# Patient Record
Sex: MALE | Race: WHITE | Employment: FULL TIME | URBAN - METROPOLITAN AREA
[De-identification: names, ages, dates, MRNs, and addresses within clinical notes are randomized per-mention and may not be internally consistent; named-entity substitution may affect disease eponyms.]

---

## 2021-11-11 DIAGNOSIS — I35.0 NONRHEUMATIC AORTIC (VALVE) STENOSIS: Primary | ICD-10-CM

## 2021-12-13 ENCOUNTER — HOSPITAL ENCOUNTER (OUTPATIENT)
Dept: CT IMAGING | Age: 46
Discharge: HOME | End: 2021-12-13
Attending: NURSE PRACTITIONER
Payer: COMMERCIAL

## 2021-12-13 DIAGNOSIS — I35.0 NONRHEUMATIC AORTIC (VALVE) STENOSIS: ICD-10-CM

## 2021-12-13 PROCEDURE — 74011000636 HC RX REV CODE- 636: Performed by: NURSE PRACTITIONER

## 2021-12-13 PROCEDURE — 71275 CT ANGIOGRAPHY CHEST: CPT

## 2021-12-13 RX ADMIN — IOPAMIDOL 100 ML: 755 INJECTION, SOLUTION INTRAVENOUS at 07:28

## 2021-12-15 ENCOUNTER — OFFICE VISIT (OUTPATIENT)
Dept: CARDIOTHORACIC SURGERY | Age: 46
End: 2021-12-15
Payer: COMMERCIAL

## 2021-12-15 VITALS
BODY MASS INDEX: 29.34 KG/M2 | RESPIRATION RATE: 16 BRPM | OXYGEN SATURATION: 99 % | HEIGHT: 71 IN | DIASTOLIC BLOOD PRESSURE: 90 MMHG | HEART RATE: 76 BPM | TEMPERATURE: 98.3 F | SYSTOLIC BLOOD PRESSURE: 162 MMHG | WEIGHT: 209.6 LBS

## 2021-12-15 DIAGNOSIS — I35.0 AORTIC VALVE STENOSIS, ETIOLOGY OF CARDIAC VALVE DISEASE UNSPECIFIED: Primary | ICD-10-CM

## 2021-12-15 PROCEDURE — 99204 OFFICE O/P NEW MOD 45 MIN: CPT | Performed by: NURSE PRACTITIONER

## 2021-12-15 RX ORDER — ASCORBIC ACID 500 MG
500 TABLET ORAL DAILY
COMMUNITY
End: 2022-01-17

## 2021-12-15 NOTE — PROGRESS NOTES
CSS   History and Physical    Subjective:      Tao Best is a 55 y.o. male who was referred for cardiac evaluation from Dr. Ken Berrios for AS. He has a medical history of AS and recent Covid infection. 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 He has been followed by Cardiology since then. He had his annual TTE in November of this year and was told he needed to see cardiac surgery. Since then he has had anxiety and stress about his AV. He is very active. 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 He had been running and playing soccer. Does report some decrease in activity. More SOB when playing soccer that has occurred slowly over the past couple of years. He has had CP over the past couple weeks laying in bed, not with activity, when he is feeling anxious. He states it feels like heartburn. Has felt occasional flutters in his chest over past month. He has had SOB when falling asleep -never woken from sleep with SOB. He has also been attributing this to anxiety. He denies dizziness, edema. He had covid-19 in September 2021 and had SOB and chest tightness for about 3 days. He is a former smoker -quit 13 years ago, smokes for about 10 years. He occasionally drinks alcohol. He is  -has 3kids 15years old and 3years old. He works as a . He is active playing soccer and running. He does have a family history of AS -dad's brother and paternal grandfather. Covid vaccine [employer] 9/2021. Cardiac Testing    Cardiac catheterization: none    ECHO: EF 60%, calcified AoV with severe AS mean gradient 45 mmhg. Mild LAE. RVSP 28 mmhg. Mildly dilated ascending aorta 42 mm. CTA chest:  Arteriogram:     Thoracic aorta: Coarse aortic valvular calcifications. Minimal aneurysmal  dilatation of the ascending aorta measuring 4 cm. No significant thoracic aortic  stenosis.  Great vessel origins are patent     Chest:     LUNGS: No focal mass or consolidation. LYMPH NODES: No thoracic lymphadenopathy. PLEURAL FLUID: No pleural effusion. PERICARDIAL FLUID: No pericardial effusion. THYROID: No thyroid nodule. OTHER: None        IMPRESSION     Aortic valvular calcifications. Borderline aneurysmal dilatation of the  ascending aorta measuring 4 cm. Past Medical History:   Diagnosis Date    Aortic stenosis      History reviewed. No pertinent surgical history. Social History     Tobacco Use    Smoking status: Former Smoker     Packs/day: 1.00     Years: 6.50     Pack years: 6.50     Quit date:      Years since quittin.9    Smokeless tobacco: Never Used   Substance Use Topics    Alcohol use: Yes     Alcohol/week: 12.0 standard drinks     Types: 12 Cans of beer per week      Family History   Problem Relation Age of Onset    Aortic stenosis Paternal Uncle     Stroke Paternal Grandfather      Prior to Admission medications    Medication Sig Start Date End Date Taking? Authorizing Provider   zinc 50 mg tab tablet Take 50 mg by mouth daily. Yes Provider, Historical   ascorbic acid, vitamin C, (Vitamin C) 500 mg tablet Take 500 mg by mouth daily. Yes Provider, Historical       Not on File      Review of Systems: pertinent positives in HPI   Consititutional: Denies fever or chills. Eyes:  Denies use of glasses or vision problems(cataracts). ENT:  Denies hearing or swallowing difficulty. CV: Denies CP, claudication, HTN. Resp: Denies dyspnea, productive cough. : Denies dialysis or kidney problems. GI: Denies ulcers, esophageal strictures, liver problems. M/S: Denies joint or bone problems, or implanted artificial hardware. Skin: Denies varicose veins, edema. Neuro: Denies strokes, or TIAs. Psych: Denies anxiety or depression. Endocrine: Denies thyroid problems or diabetes. Heme/Lymphatic: Denies easy bruising or lymphedema.      Objective:     VS:   Visit Vitals  BP (!) 162/90 (BP 1 Location: Right upper arm, BP Patient Position: Sitting)   Pulse 76   Temp 98.3 °F (36.8 °C) (Oral)   Resp 16   Ht 5' 11\" (1.803 m)   Wt 209 lb 9.6 oz (95.1 kg)   SpO2 99%   BMI 29.23 kg/m²       Physical Exam:    General appearance: alert, cooperative, no distress  Head: normocephalic, without obvious abnormality; atraumatic  Eyes: conjunctivae/corneas clear; EOM's intact. Nose: nares normal; no drainage. Neck: no carotid bruit and no JVD  Lungs: clear to auscultation bilaterally  Heart: regular rate and rhythm; +4/6 murmur  Abdomen: soft, non-tender; bowel sounds normal  Extremities: moves all extremities; no weakness. Skin: Skin color normal; No varicose veins or edema. Neurologic: Grossly normal      Labs: No results for input(s): WBC, HGB, HCT, PLT, NA, K, BUN, CREA, GLU, GLUCPOC, INR, HGBEXT, HCTEXT, PLTEXT, INREXT, HGBEXT, HCTEXT, PLTEXT, INREXT in the last 72 hours. No lab exists for component: GLPOC    Diagnostics:   PA and lateral: needs    Carotid doppler: needs    PFTS-FEV1: none    EKG: needs      Plan:     STS Risk Calculator V2.81 - Discussed by surgeon with patient. Procedure: Isolated AVR  Risk of Mortality:  0.448%  Renal Failure:  0.755%  Permanent Stroke:  0.417%  Prolonged Ventilation:  2.010%  DSW Infection:  0.065%  Reoperation:  1.964%  Morbidity or Mortality:  3.904%  Short Length of Stay:  74.224%  Long Length of Stay:  1.224%    Treatment Plan:    1. Severe AS: Plan for AVR with Dr. Gregorio Noss. 2. Recent covid 19 infection: resolved  3. HTN: BP elevated today. Pt reports due to anxiety at this appointment, monitor  4. Anxiety: situational dt need for surgery, monitor. Signed By: Юлия Lawrence NP     December 15, 2021       Addendum: Pt seen and examined. Images reviewed. Agree with NP Jane Riley note. He is a great candidate for open AVR. We discussed mechanical vs tissue valve and he prefers an On-X mechanical valve.  We discssued the risks and benefiits and he agreed to proceed with surgery. We have him  Tentatively scheduled the first week of January. I discussed directly with his referring physician, Dr. Shelby Woo.

## 2021-12-15 NOTE — PROGRESS NOTES
Chief Complaint   Patient presents with    Aortic Stenosis     1. Have you been to the ER, urgent care clinic since your last visit? Hospitalized since your last visit? No    2. Have you seen or consulted any other health care providers outside of the [address] [place] [address] since your last visit? Include any pap smears or colon screening.  No

## 2021-12-16 DIAGNOSIS — I35.0 AORTIC VALVE STENOSIS, ETIOLOGY OF CARDIAC VALVE DISEASE UNSPECIFIED: Primary | ICD-10-CM

## 2021-12-29 ENCOUNTER — HOSPITAL ENCOUNTER (OUTPATIENT)
Dept: GENERAL RADIOLOGY | Age: 46
Discharge: HOME | End: 2021-12-29
Attending: THORACIC SURGERY (CARDIOTHORACIC VASCULAR SURGERY)
Payer: COMMERCIAL

## 2021-12-29 ENCOUNTER — HOSPITAL ENCOUNTER (OUTPATIENT)
Dept: PREADMISSION TESTING | Age: 46
Discharge: HOME | End: 2021-12-29
Attending: THORACIC SURGERY (CARDIOTHORACIC VASCULAR SURGERY)
Payer: COMMERCIAL

## 2021-12-29 ENCOUNTER — HOSPITAL ENCOUNTER (OUTPATIENT)
Dept: VASCULAR SURGERY | Age: 46
Discharge: HOME | End: 2021-12-29
Attending: NURSE PRACTITIONER
Payer: COMMERCIAL

## 2021-12-29 VITALS
DIASTOLIC BLOOD PRESSURE: 79 MMHG | SYSTOLIC BLOOD PRESSURE: 127 MMHG | RESPIRATION RATE: 18 BRPM | HEART RATE: 62 BPM | TEMPERATURE: 98.1 F | OXYGEN SATURATION: 98 %

## 2021-12-29 DIAGNOSIS — I35.0 AORTIC VALVE STENOSIS, ETIOLOGY OF CARDIAC VALVE DISEASE UNSPECIFIED: ICD-10-CM

## 2021-12-29 LAB
ALBUMIN SERPL-MCNC: 4.2 G/DL (ref 3.5–5)
ALBUMIN/GLOB SERPL: 1.2 {RATIO} (ref 1.1–2.2)
ALP SERPL-CCNC: 56 U/L (ref 45–117)
ALT SERPL-CCNC: 26 U/L (ref 12–78)
ANION GAP SERPL CALC-SCNC: 5 MMOL/L (ref 5–15)
APPEARANCE UR: CLEAR
APTT PPP: 26.2 SEC (ref 22.1–31)
ARTERIAL PATENCY WRIST A: YES
AST SERPL-CCNC: 23 U/L (ref 15–37)
ATRIAL RATE: 60 BPM
BACTERIA URNS QL MICRO: NEGATIVE /HPF
BASE EXCESS BLDA CALC-SCNC: 1.6 MMOL/L
BASOPHILS # BLD: 0.1 K/UL (ref 0–0.1)
BASOPHILS NFR BLD: 1 % (ref 0–1)
BDY SITE: NORMAL
BILIRUB SERPL-MCNC: 0.4 MG/DL (ref 0.2–1)
BILIRUB UR QL: NEGATIVE
BNP SERPL-MCNC: 172 PG/ML
BUN SERPL-MCNC: 18 MG/DL (ref 6–20)
BUN/CREAT SERPL: 16 (ref 12–20)
CALCIUM SERPL-MCNC: 9.5 MG/DL (ref 8.5–10.1)
CALCULATED P AXIS, ECG09: 10 DEGREES
CALCULATED R AXIS, ECG10: -8 DEGREES
CALCULATED T AXIS, ECG11: 24 DEGREES
CHLORIDE SERPL-SCNC: 105 MMOL/L (ref 97–108)
CO2 SERPL-SCNC: 29 MMOL/L (ref 21–32)
COLOR UR: NORMAL
CREAT SERPL-MCNC: 1.11 MG/DL (ref 0.7–1.3)
DIAGNOSIS, 93000: NORMAL
DIFFERENTIAL METHOD BLD: NORMAL
EOSINOPHIL # BLD: 0.1 K/UL (ref 0–0.4)
EOSINOPHIL NFR BLD: 1 % (ref 0–7)
EPITH CASTS URNS QL MICRO: NORMAL /LPF
ERYTHROCYTE [DISTWIDTH] IN BLOOD BY AUTOMATED COUNT: 12.1 % (ref 11.5–14.5)
EST. AVERAGE GLUCOSE BLD GHB EST-MCNC: 103 MG/DL
GLOBULIN SER CALC-MCNC: 3.5 G/DL (ref 2–4)
GLUCOSE SERPL-MCNC: 86 MG/DL (ref 65–100)
GLUCOSE UR STRIP.AUTO-MCNC: NEGATIVE MG/DL
HBA1C MFR BLD: 5.2 % (ref 4–5.6)
HCO3 BLDA-SCNC: 26 MMOL/L (ref 22–26)
HCT VFR BLD AUTO: 44.7 % (ref 36.6–50.3)
HGB BLD-MCNC: 15.2 G/DL (ref 12.1–17)
HGB UR QL STRIP: NEGATIVE
HISTORY CHECKED?,CKHIST: NORMAL
HYALINE CASTS URNS QL MICRO: NORMAL /LPF (ref 0–5)
IMM GRANULOCYTES # BLD AUTO: 0 K/UL (ref 0–0.04)
IMM GRANULOCYTES NFR BLD AUTO: 0 % (ref 0–0.5)
INR PPP: 1 (ref 0.9–1.1)
KETONES UR QL STRIP.AUTO: NEGATIVE MG/DL
LEUKOCYTE ESTERASE UR QL STRIP.AUTO: NEGATIVE
LYMPHOCYTES # BLD: 1.6 K/UL (ref 0.8–3.5)
LYMPHOCYTES NFR BLD: 20 % (ref 12–49)
MAGNESIUM SERPL-MCNC: 2.3 MG/DL (ref 1.6–2.4)
MCH RBC QN AUTO: 32.4 PG (ref 26–34)
MCHC RBC AUTO-ENTMCNC: 34 G/DL (ref 30–36.5)
MCV RBC AUTO: 95.3 FL (ref 80–99)
MONOCYTES # BLD: 0.7 K/UL (ref 0–1)
MONOCYTES NFR BLD: 9 % (ref 5–13)
NEUTS SEG # BLD: 5.8 K/UL (ref 1.8–8)
NEUTS SEG NFR BLD: 69 % (ref 32–75)
NITRITE UR QL STRIP.AUTO: NEGATIVE
NRBC # BLD: 0 K/UL (ref 0–0.01)
NRBC BLD-RTO: 0 PER 100 WBC
P-R INTERVAL, ECG05: 162 MS
PCO2 BLDA: 41 MMHG (ref 35–45)
PH BLDA: 7.43 [PH] (ref 7.35–7.45)
PH UR STRIP: 6.5 [PH] (ref 5–8)
PLATELET # BLD AUTO: 209 K/UL (ref 150–400)
PMV BLD AUTO: 10.8 FL (ref 8.9–12.9)
PO2 BLDA: 90 MMHG (ref 80–100)
POTASSIUM SERPL-SCNC: 4.3 MMOL/L (ref 3.5–5.1)
PROT SERPL-MCNC: 7.7 G/DL (ref 6.4–8.2)
PROT UR STRIP-MCNC: NEGATIVE MG/DL
PROTHROMBIN TIME: 10.3 SEC (ref 9–11.1)
Q-T INTERVAL, ECG07: 424 MS
QRS DURATION, ECG06: 96 MS
QTC CALCULATION (BEZET), ECG08: 424 MS
RBC # BLD AUTO: 4.69 M/UL (ref 4.1–5.7)
RBC #/AREA URNS HPF: NORMAL /HPF (ref 0–5)
SAO2 % BLD: 97 % (ref 92–97)
SAO2% DEVICE SAO2% SENSOR NAME: NORMAL
SODIUM SERPL-SCNC: 139 MMOL/L (ref 136–145)
SP GR UR REFRACTOMETRY: 1.02 (ref 1–1.03)
SPECIMEN SITE: NORMAL
THERAPEUTIC RANGE,PTTT: NORMAL SECS (ref 58–77)
TSH SERPL DL<=0.05 MIU/L-ACNC: 2.35 UIU/ML (ref 0.36–3.74)
UA: UC IF INDICATED,UAUC: NORMAL
UROBILINOGEN UR QL STRIP.AUTO: 0.2 EU/DL (ref 0.2–1)
VENTRICULAR RATE, ECG03: 60 BPM
WBC # BLD AUTO: 8.3 K/UL (ref 4.1–11.1)
WBC URNS QL MICRO: NORMAL /HPF (ref 0–4)

## 2021-12-29 PROCEDURE — 71046 X-RAY EXAM CHEST 2 VIEWS: CPT

## 2021-12-29 PROCEDURE — 93005 ELECTROCARDIOGRAM TRACING: CPT

## 2021-12-29 PROCEDURE — 80053 COMPREHEN METABOLIC PANEL: CPT

## 2021-12-29 PROCEDURE — 86900 BLOOD TYPING SEROLOGIC ABO: CPT

## 2021-12-29 PROCEDURE — 84443 ASSAY THYROID STIM HORMONE: CPT

## 2021-12-29 PROCEDURE — 36600 WITHDRAWAL OF ARTERIAL BLOOD: CPT

## 2021-12-29 PROCEDURE — 85730 THROMBOPLASTIN TIME PARTIAL: CPT

## 2021-12-29 PROCEDURE — 85610 PROTHROMBIN TIME: CPT

## 2021-12-29 PROCEDURE — 83735 ASSAY OF MAGNESIUM: CPT

## 2021-12-29 PROCEDURE — 83880 ASSAY OF NATRIURETIC PEPTIDE: CPT

## 2021-12-29 PROCEDURE — 83036 HEMOGLOBIN GLYCOSYLATED A1C: CPT

## 2021-12-29 PROCEDURE — 36415 COLL VENOUS BLD VENIPUNCTURE: CPT

## 2021-12-29 PROCEDURE — 81001 URINALYSIS AUTO W/SCOPE: CPT

## 2021-12-29 PROCEDURE — 85025 COMPLETE CBC W/AUTO DIFF WBC: CPT

## 2021-12-29 PROCEDURE — 93880 EXTRACRANIAL BILAT STUDY: CPT

## 2021-12-29 PROCEDURE — 86923 COMPATIBILITY TEST ELECTRIC: CPT

## 2021-12-29 PROCEDURE — U0005 INFEC AGEN DETEC AMPLI PROBE: HCPCS

## 2021-12-29 PROCEDURE — 82803 BLOOD GASES ANY COMBINATION: CPT

## 2021-12-29 RX ORDER — MUPIROCIN 20 MG/G
OINTMENT TOPICAL 2 TIMES DAILY
Qty: 22 G | Refills: 0 | Status: SHIPPED | OUTPATIENT
Start: 2022-01-02 | End: 2022-01-04

## 2021-12-29 RX ORDER — AMIODARONE HYDROCHLORIDE 400 MG/1
400 TABLET ORAL 2 TIMES DAILY
COMMUNITY
Start: 2021-12-30 | End: 2021-12-29 | Stop reason: SINTOL

## 2021-12-29 RX ORDER — CHLORHEXIDINE GLUCONATE 1.2 MG/ML
15 RINSE ORAL 2 TIMES DAILY
Qty: 420 ML | Refills: 0 | Status: SHIPPED | OUTPATIENT
Start: 2022-01-02 | End: 2022-01-04

## 2021-12-29 RX ORDER — CHLORHEXIDINE GLUCONATE 1.2 MG/ML
15 RINSE ORAL EVERY 12 HOURS
COMMUNITY
Start: 2022-01-02 | End: 2021-12-29 | Stop reason: SDUPTHER

## 2021-12-29 NOTE — H&P
CSS   History and Physical    Subjective:      Taken from prior office consult - no significant changes    Franny Verdin is a 55 y.o. male who was referred for cardiac evaluation from Dr. Ibeth Troncoso for AS. He has a medical history of AS and recent Covid infection.      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 He has been followed by Cardiology since then. He had his annual TTE in November of this year and was told he needed to see cardiac surgery. Since then he has had anxiety and stress about his AV. He is very active. 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 He had been running and playing soccer. Does report some decrease in activity. More SOB when playing soccer that has occurred slowly over the past couple of years. He has had CP over the past couple weeks laying in bed, not with activity, when he is feeling anxious. He states it feels like heartburn. Has felt occasional flutters in his chest over past month. He has had SOB when falling asleep -never woken from sleep with SOB. He has also been attributing this to anxiety. He denies dizziness, edema. He had covid-19 in September 2021 and had SOB and chest tightness for about 3 days.       He is a former smoker -quit 13 years ago, smokes for about 10 years. He occasionally drinks alcohol. He is  -has 3kids 15years old and 3years old. He works as a . He is active playing soccer and running. He does have a family history of AS -dad's brother and paternal grandfather. Covid vaccine [employer] 9/2021.      Cardiac Testing     Cardiac catheterization: none     ECHO: EF 60%, calcified AoV with severe AS mean gradient 45 mmhg. Mild LAE. RVSP 28 mmhg. Mildly dilated ascending aorta 42 mm.      CTA chest:  Arteriogram:     Thoracic aorta: Coarse aortic valvular calcifications. Minimal aneurysmal  dilatation of the ascending aorta measuring 4 cm.  No significant thoracic aortic  stenosis. Great vessel origins are patent     Chest:     LUNGS: No focal mass or consolidation. LYMPH NODES: No thoracic lymphadenopathy. PLEURAL FLUID: No pleural effusion. PERICARDIAL FLUID: No pericardial effusion. THYROID: No thyroid nodule. OTHER: None        IMPRESSION     Aortic valvular calcifications. Borderline aneurysmal dilatation of the  ascending aorta measuring 4 cm. Past Medical History:   Diagnosis Date    Aortic stenosis     Aortic valve stenosis      No past surgical history on file. Social History     Tobacco Use    Smoking status: Former Smoker     Packs/day: 1.00     Years: 6.50     Pack years: 6.50     Quit date: 2007     Years since quitting: 15.0    Smokeless tobacco: Never Used   Substance Use Topics    Alcohol use: Yes     Alcohol/week: 12.0 standard drinks     Types: 12 Cans of beer per week      Family History   Problem Relation Age of Onset    Aortic stenosis Paternal Uncle     Stroke Paternal Grandfather     Aortic stenosis Paternal Grandmother      Prior to Admission medications    Medication Sig Start Date End Date Taking? Authorizing Provider   amiodarone (PACERONE) 400 mg tablet Take 400 mg by mouth two (2) times a day. 12/30/21   Provider, Historical   mupirocin calcium (Bactroban NasaL) 2 % nasal ointment by Both Nostrils route two (2) times a day. 1/2/22   Provider, Historical   chlorhexidine (Peridex) 0.12 % solution 15 mL by Swish and Spit route every twelve (12) hours. 1/2/22   Provider, Historical   zinc 50 mg tab tablet Take 50 mg by mouth daily. Provider, Historical   ascorbic acid, vitamin C, (Vitamin C) 500 mg tablet Take 500 mg by mouth daily. Provider, Historical       Not on File    Review of Systems:   Consititutional: Denies fever or chills. Eyes:  Denies use of glasses or vision problems(cataracts). ENT:  Denies hearing or swallowing difficulty. CV: Denies CP, claudication, HTN.   Resp: Denies dyspnea, productive cough. : Denies dialysis or kidney problems. GI: Denies ulcers, esophageal strictures, liver problems. M/S: Denies joint or bone problems, or implanted artificial hardware. Skin: Denies varicose veins, edema. Neuro: Denies strokes, or TIAs. Psych: Denies anxiety or depression. Endocrine: Denies thyroid problems or diabetes. Heme/Lymphatic: Denies easy bruising or lymphedema. Objective:     VS:   Wt: 95.1 kg  Ht: 180 cm  HR: 62  RR: 12  BP: 142/70  Temp: 98.1 degrees F  SpO2: 97%    Physical Exam:    General appearance: alert, cooperative, no distress  Head: normocephalic, without obvious abnormality; atraumatic  Eyes: conjunctivae/corneas clear; EOM's intact. Nose: nares normal; no drainage. Neck: no carotid bruit and no JVD  Lungs: clear to auscultation bilaterally  Heart: regular rate and rhythm; + murmur  Abdomen: soft, non-tender; bowel sounds normal  Extremities: moves all extremities; no weakness. Skin: Skin color normal; No varicose veins or edema. Neurologic: Grossly normal      Labs: No results for input(s): WBC, HGB, HCT, PLT, NA, K, BUN, CREA, GLU, GLUCPOC, INR, HGBEXT, HCTEXT, PLTEXT, INREXT, HGBEXT, HCTEXT, PLTEXT, INREXT in the last 72 hours. No lab exists for component: GLPOC    Diagnostics:   PA and lateral:    Carotid doppler:     PFTS-FEV1:     EKG:   Assessment:     Active Problems: Aortic stenosis (12/29/2021)        Plan:   The risk and benefit of surgery were reviewed with patient and family and all questions answered and the patient wishes to proceed. Risk include infection, bleeding, stroke, heart attack, irregular heart rhythm, kidney failure and death. The patient was given instructions and prescriptions for bactroban and peridex. No amio due to bradycardia. The patient was instructed to stop nothing. Surgery is scheduled for 1-4-21. STS Risk Calculator V2.81 - Discussed by surgeon with patient.    Procedure: Isolated AVR  Risk of Mortality:  0.448%  Renal Failure:  0.755%  Permanent Stroke:  0.417%  Prolonged Ventilation:  2.010%  DSW Infection:  0.065%  Reoperation:  1.964%  Morbidity or Mortality:  3.904%  Short Length of Stay:  74.224%  Long Length of Stay:  1.224%     Treatment Plan:    1. Severe AS: Plan for AVR with Dr. Fely Pablo. 2. Recent covid 19 infection: resolved      I have discussed with the patient the rationale for blood component transfusion; its benefits in treating or preventing fatigue, organ damage, or death; and its risk which includes mild transfusion reactions, rare risk of blood borne infection, or more serious but rare reactions. I have discussed the alternatives to transfusion, including the risk and consequences of not receiving transfusion. The patient had an opportunity to ask questions and had agreed to proceed with transfusion of blood components.       Signed By: BRYSON Ly     December 29, 2021

## 2021-12-29 NOTE — PROGRESS NOTES
Anesthesia  Consult note     Anesthesia consult requested by the surgeon for:  suitability of patient for General anesthesia for  minimal  Invasive surgery   contraindications for  One lung ventilation  Contraindication for DIMITRI  Suitability for invasive lines    Subjective:      Patient:  Grey Apgar     Procedure: Procedure(s):  AORTIC VALVE REPLACEMENT (AVR) WITH CARDIOPLEGIA      Not on File    Current Outpatient Medications   Medication Sig    zinc 50 mg tab tablet Take 50 mg by mouth daily.  ascorbic acid, vitamin C, (Vitamin C) 500 mg tablet Take 500 mg by mouth daily.  [START ON 2022] chlorhexidine (Peridex) 0.12 % solution 15 mL by Swish and Spit route two (2) times a day for 2 days.  [START ON 2022] mupirocin (BACTROBAN) 2 % ointment by Both Nostrils route two (2) times a day for 2 days. No current facility-administered medications for this encounter. Past Medical History:   Diagnosis Date    Aortic stenosis     Aortic valve stenosis        History reviewed. No pertinent surgical history. Social History     Tobacco Use    Smoking status: Former Smoker     Packs/day: 1.00     Years: 6.50     Pack years: 6.50     Quit date:      Years since quitting: 15.0    Smokeless tobacco: Never Used   Substance Use Topics    Alcohol use: Yes     Alcohol/week: 12.0 standard drinks     Types: 12 Cans of beer per week         Anesthetic Problems: None in the past   patient denies any problems with swallowing, esophageal stricture, upper GI bleed, GI surgery. No contraindications for DIMITRI. Objective:     Physical Exam:    Patient Vitals for the past 8 hrs:   BP Temp Pulse Resp SpO2   21 1304 127/79 36.7 °C (98.1 °F) 62 18 98 %       Temp (24hrs), Av.7 °C (98.1 °F), Min:36.7 °C (98.1 °F), Max:36.7 °C (98.1 °F)      Airway Class: 2  Heart-  Regular rate and rhythm,   s1 s2 heard slight murmer.   Lungs- Clear bilateral on ascultation  Abd- Soft, non tender no organomegaly  Limbs- Normal  Neuro- Normal    Labs:   Recent Results (from the past 24 hour(s))   DUPLEX CAROTID BILATERAL    Collection Time: 12/29/21 11:56 AM   Result Value Ref Range    Right subclavian prox .2 cm/s    Right subclavian prox EDV 13.7 cm/s    Right cca dist sys 82.2 cm/s    Right CCA dist turner 20.8 cm/s    Right CCA prox sys 120.6 cm/s    Right CCA prox turner 30.0 cm/s    Right ICA dist sys 79.0 cm/s    Right ICA dist turner 32.1 cm/s    Right ICA mid sys 88.7 cm/s    Right ICA mid turner 35.4 cm/s    Right ICA prox sys 74.1 cm/s    Right ICA prox turner 22.5 cm/s    Right eca sys 109.6 cm/s    RIGHT EXTERNAL CAROTID ARTERY D 25.67 cm/s    Right vertebral sys 59.2 cm/s    RIGHT VERTEBRAL ARTERY D 16.37 cm/s    Right ICA/CCA sys 1.1     Left subclavian prox .7 cm/s    Left subclavian prox EDV 20.7 cm/s    Left CCA dist sys 87.9 cm/s    Left CCA dist turner 19.3 cm/s    Left CCA prox sys 116.1 cm/s    Left CCA prox turner 23.5 cm/s    Left ICA dist sys 95.2 cm/s    Left ICA dist turner 32.2 cm/s    Left ICA mid sys 103.2 cm/s    Left ICA mid turner 30.5 cm/s    Left ICA prox sys 89.2 cm/s    Left ICA prox turner 21.9 cm/s    Left ECA sys 111.3 cm/s    LEFT EXTERNAL CAROTID ARTERY D 25.68 cm/s    Left vertebral sys 50.4 cm/s    LEFT VERTEBRAL ARTERY D 15.28 cm/s    Left ICA/CCA sys 1.17    CBC WITH AUTOMATED DIFF    Collection Time: 12/29/21  1:07 PM   Result Value Ref Range    WBC 8.3 4.1 - 11.1 K/uL    RBC 4.69 4.10 - 5.70 M/uL    HGB 15.2 12.1 - 17.0 g/dL    HCT 44.7 36.6 - 50.3 %    MCV 95.3 80.0 - 99.0 FL    MCH 32.4 26.0 - 34.0 PG    MCHC 34.0 30.0 - 36.5 g/dL    RDW 12.1 11.5 - 14.5 %    PLATELET 462 216 - 989 K/uL    MPV 10.8 8.9 - 12.9 FL    NRBC 0.0 0  WBC    ABSOLUTE NRBC 0.00 0.00 - 0.01 K/uL    NEUTROPHILS 69 32 - 75 %    LYMPHOCYTES 20 12 - 49 %    MONOCYTES 9 5 - 13 %    EOSINOPHILS 1 0 - 7 %    BASOPHILS 1 0 - 1 %    IMMATURE GRANULOCYTES 0 0.0 - 0.5 %    ABS.  NEUTROPHILS 5.8 1.8 - 8.0 K/UL    ABS. LYMPHOCYTES 1.6 0.8 - 3.5 K/UL    ABS. MONOCYTES 0.7 0.0 - 1.0 K/UL    ABS. EOSINOPHILS 0.1 0.0 - 0.4 K/UL    ABS. BASOPHILS 0.1 0.0 - 0.1 K/UL    ABS. IMM. GRANS. 0.0 0.00 - 0.04 K/UL    DF AUTOMATED     METABOLIC PANEL, COMPREHENSIVE    Collection Time: 12/29/21  1:07 PM   Result Value Ref Range    Sodium 139 136 - 145 mmol/L    Potassium 4.3 3.5 - 5.1 mmol/L    Chloride 105 97 - 108 mmol/L    CO2 29 21 - 32 mmol/L    Anion gap 5 5 - 15 mmol/L    Glucose 86 65 - 100 mg/dL    BUN 18 6 - 20 MG/DL    Creatinine 1.11 0.70 - 1.30 MG/DL    BUN/Creatinine ratio 16 12 - 20      GFR est AA >60 >60 ml/min/1.73m2    GFR est non-AA >60 >60 ml/min/1.73m2    Calcium 9.5 8.5 - 10.1 MG/DL    Bilirubin, total 0.4 0.2 - 1.0 MG/DL    ALT (SGPT) 26 12 - 78 U/L    AST (SGOT) 23 15 - 37 U/L    Alk.  phosphatase 56 45 - 117 U/L    Protein, total 7.7 6.4 - 8.2 g/dL    Albumin 4.2 3.5 - 5.0 g/dL    Globulin 3.5 2.0 - 4.0 g/dL    A-G Ratio 1.2 1.1 - 2.2     PROTHROMBIN TIME + INR    Collection Time: 12/29/21  1:07 PM   Result Value Ref Range    INR 1.0 0.9 - 1.1      Prothrombin time 10.3 9.0 - 11.1 sec   PTT    Collection Time: 12/29/21  1:07 PM   Result Value Ref Range    aPTT 26.2 22.1 - 31.0 sec    aPTT, therapeutic range     58.0 - 77.0 SECS   NT-PRO BNP    Collection Time: 12/29/21  1:07 PM   Result Value Ref Range    NT pro- (H) <125 PG/ML   TSH 3RD GENERATION    Collection Time: 12/29/21  1:07 PM   Result Value Ref Range    TSH 2.35 0.36 - 3.74 uIU/mL   URINALYSIS W/ REFLEX CULTURE    Collection Time: 12/29/21  1:07 PM    Specimen: Urine   Result Value Ref Range    Color YELLOW/STRAW      Appearance CLEAR CLEAR      Specific gravity 1.021 1.003 - 1.030      pH (UA) 6.5 5.0 - 8.0      Protein Negative NEG mg/dL    Glucose Negative NEG mg/dL    Ketone Negative NEG mg/dL    Bilirubin Negative NEG      Blood Negative NEG      Urobilinogen 0.2 0.2 - 1.0 EU/dL    Nitrites Negative NEG      Leukocyte Esterase Negative NEG      WBC 0-4 0 - 4 /hpf    RBC 0-5 0 - 5 /hpf    Epithelial cells FEW FEW /lpf    Bacteria Negative NEG /hpf    UA:UC IF INDICATED CULTURE NOT INDICATED BY UA RESULT CNI      Hyaline cast 0-2 0 - 5 /lpf   MAGNESIUM    Collection Time: 12/29/21  1:07 PM   Result Value Ref Range    Magnesium 2.3 1.6 - 2.4 mg/dL   EKG, 12 LEAD, INITIAL    Collection Time: 12/29/21  1:32 PM   Result Value Ref Range    Ventricular Rate 60 BPM    Atrial Rate 60 BPM    P-R Interval 162 ms    QRS Duration 96 ms    Q-T Interval 424 ms    QTC Calculation (Bezet) 424 ms    Calculated P Axis 10 degrees    Calculated R Axis -8 degrees    Calculated T Axis 24 degrees    Diagnosis       Normal sinus rhythm  Normal ECG  No previous ECGs available     BLOOD GAS, ARTERIAL    Collection Time: 12/29/21  2:16 PM   Result Value Ref Range    pH 7.43 7.35 - 7.45      PCO2 41 35 - 45 mmHg    PO2 90 80 - 100 mmHg    O2 SAT 97 92 - 97 %    BICARBONATE 26 22 - 26 mmol/L    BASE EXCESS 1.6 mmol/L    O2 METHOD ROOM AIR      Sample source ARTERIAL      SITE LEFT RADIAL      LIZABETH'S TEST YES           Assessment:       Active Problems:    * No active hospital problems. *      ASA4    Plan/Recommendations/Medical Decision Making:       Anesthetic Plan: GETA with invasive monitoring, post op ventilation and DIMITRI monitoring  Risks and benefits of the anesthetic plan discussed and agreed upon by the patient.       no contraindications  For one lung ventilation, large bore lines, DIMITRI  Signed By: Alex Gambino MD  Date:           12/29/2021  Time:          2:26 PM  Anesthesia  Consult note     Anesthesia consult requested by the surgeon for:  suitability of patient for General anesthesia for  minimal  Invasive surgery   contraindications for  One lung ventilation  Contraindication for DIMITRI  Suitability for invasive lines    Subjective:      Patient:  Shellye [place]     Procedure: Procedure(s):  AORTIC VALVE REPLACEMENT (AVR) WITH CARDIOPLEGIA      Not on File    Current Outpatient Medications   Medication Sig    zinc 50 mg tab tablet Take 50 mg by mouth daily.  ascorbic acid, vitamin C, (Vitamin C) 500 mg tablet Take 500 mg by mouth daily.  [START ON 2022] chlorhexidine (Peridex) 0.12 % solution 15 mL by Swish and Spit route two (2) times a day for 2 days.  [START ON 2022] mupirocin (BACTROBAN) 2 % ointment by Both Nostrils route two (2) times a day for 2 days. No current facility-administered medications for this encounter. Past Medical History:   Diagnosis Date    Aortic stenosis     Aortic valve stenosis        History reviewed. No pertinent surgical history. Social History     Tobacco Use    Smoking status: Former Smoker     Packs/day: 1.00     Years: 6.50     Pack years: 6.50     Quit date:      Years since quitting: 15.0    Smokeless tobacco: Never Used   Substance Use Topics    Alcohol use: Yes     Alcohol/week: 12.0 standard drinks     Types: 12 Cans of beer per week         Anesthetic Problems: None in the past   patient denies any problems with swallowing, esophageal stricture, upper GI bleed, GI surgery. No contraindications for DIMITRI. Objective:     Physical Exam:    Patient Vitals for the past 8 hrs:   BP Temp Pulse Resp SpO2   21 1304 127/79 36.7 °C (98.1 °F) 62 18 98 %       Temp (24hrs), Av.7 °C (98.1 °F), Min:36.7 °C (98.1 °F), Max:36.7 °C (98.1 °F)      Airway Class: 2  Heart-  Regular rate and rhythm,   s1 s2 heard slight   murmer.   Lungs- Clear bilateral on ascultation  Abd- Soft, non tender no organomegaly  Limbs- Normal  Neuro- Normal    Labs:   Recent Results (from the past 24 hour(s))   DUPLEX CAROTID BILATERAL    Collection Time: 21 11:56 AM   Result Value Ref Range    Right subclavian prox .2 cm/s    Right subclavian prox EDV 13.7 cm/s    Right cca dist sys 82.2 cm/s    Right CCA dist turner 20.8 cm/s    Right CCA prox sys 120.6 cm/s    Right CCA prox turner 30.0 cm/s    Right ICA dist sys 79.0 cm/s    Right ICA dist turner 32.1 cm/s    Right ICA mid sys 88.7 cm/s    Right ICA mid turner 35.4 cm/s    Right ICA prox sys 74.1 cm/s    Right ICA prox turner 22.5 cm/s    Right eca sys 109.6 cm/s    RIGHT EXTERNAL CAROTID ARTERY D 25.67 cm/s    Right vertebral sys 59.2 cm/s    RIGHT VERTEBRAL ARTERY D 16.37 cm/s    Right ICA/CCA sys 1.1     Left subclavian prox .7 cm/s    Left subclavian prox EDV 20.7 cm/s    Left CCA dist sys 87.9 cm/s    Left CCA dist turner 19.3 cm/s    Left CCA prox sys 116.1 cm/s    Left CCA prox turner 23.5 cm/s    Left ICA dist sys 95.2 cm/s    Left ICA dist turner 32.2 cm/s    Left ICA mid sys 103.2 cm/s    Left ICA mid turner 30.5 cm/s    Left ICA prox sys 89.2 cm/s    Left ICA prox turner 21.9 cm/s    Left ECA sys 111.3 cm/s    LEFT EXTERNAL CAROTID ARTERY D 25.68 cm/s    Left vertebral sys 50.4 cm/s    LEFT VERTEBRAL ARTERY D 15.28 cm/s    Left ICA/CCA sys 1.17    CBC WITH AUTOMATED DIFF    Collection Time: 12/29/21  1:07 PM   Result Value Ref Range    WBC 8.3 4.1 - 11.1 K/uL    RBC 4.69 4.10 - 5.70 M/uL    HGB 15.2 12.1 - 17.0 g/dL    HCT 44.7 36.6 - 50.3 %    MCV 95.3 80.0 - 99.0 FL    MCH 32.4 26.0 - 34.0 PG    MCHC 34.0 30.0 - 36.5 g/dL    RDW 12.1 11.5 - 14.5 %    PLATELET 618 383 - 818 K/uL    MPV 10.8 8.9 - 12.9 FL    NRBC 0.0 0  WBC    ABSOLUTE NRBC 0.00 0.00 - 0.01 K/uL    NEUTROPHILS 69 32 - 75 %    LYMPHOCYTES 20 12 - 49 %    MONOCYTES 9 5 - 13 %    EOSINOPHILS 1 0 - 7 %    BASOPHILS 1 0 - 1 %    IMMATURE GRANULOCYTES 0 0.0 - 0.5 %    ABS. NEUTROPHILS 5.8 1.8 - 8.0 K/UL    ABS. LYMPHOCYTES 1.6 0.8 - 3.5 K/UL    ABS. MONOCYTES 0.7 0.0 - 1.0 K/UL    ABS. EOSINOPHILS 0.1 0.0 - 0.4 K/UL    ABS. BASOPHILS 0.1 0.0 - 0.1 K/UL    ABS. IMM.  GRANS. 0.0 0.00 - 0.04 K/UL    DF AUTOMATED     METABOLIC PANEL, COMPREHENSIVE    Collection Time: 12/29/21  1:07 PM   Result Value Ref Range    Sodium 139 136 - 145 mmol/L    Potassium 4.3 3.5 - 5.1 mmol/L    Chloride 105 97 - 108 mmol/L    CO2 29 21 - 32 mmol/L    Anion gap 5 5 - 15 mmol/L    Glucose 86 65 - 100 mg/dL    BUN 18 6 - 20 MG/DL    Creatinine 1.11 0.70 - 1.30 MG/DL    BUN/Creatinine ratio 16 12 - 20      GFR est AA >60 >60 ml/min/1.73m2    GFR est non-AA >60 >60 ml/min/1.73m2    Calcium 9.5 8.5 - 10.1 MG/DL    Bilirubin, total 0.4 0.2 - 1.0 MG/DL    ALT (SGPT) 26 12 - 78 U/L    AST (SGOT) 23 15 - 37 U/L    Alk.  phosphatase 56 45 - 117 U/L    Protein, total 7.7 6.4 - 8.2 g/dL    Albumin 4.2 3.5 - 5.0 g/dL    Globulin 3.5 2.0 - 4.0 g/dL    A-G Ratio 1.2 1.1 - 2.2     PROTHROMBIN TIME + INR    Collection Time: 12/29/21  1:07 PM   Result Value Ref Range    INR 1.0 0.9 - 1.1      Prothrombin time 10.3 9.0 - 11.1 sec   PTT    Collection Time: 12/29/21  1:07 PM   Result Value Ref Range    aPTT 26.2 22.1 - 31.0 sec    aPTT, therapeutic range     58.0 - 77.0 SECS   NT-PRO BNP    Collection Time: 12/29/21  1:07 PM   Result Value Ref Range    NT pro- (H) <125 PG/ML   TSH 3RD GENERATION    Collection Time: 12/29/21  1:07 PM   Result Value Ref Range    TSH 2.35 0.36 - 3.74 uIU/mL   URINALYSIS W/ REFLEX CULTURE    Collection Time: 12/29/21  1:07 PM    Specimen: Urine   Result Value Ref Range    Color YELLOW/STRAW      Appearance CLEAR CLEAR      Specific gravity 1.021 1.003 - 1.030      pH (UA) 6.5 5.0 - 8.0      Protein Negative NEG mg/dL    Glucose Negative NEG mg/dL    Ketone Negative NEG mg/dL    Bilirubin Negative NEG      Blood Negative NEG      Urobilinogen 0.2 0.2 - 1.0 EU/dL    Nitrites Negative NEG      Leukocyte Esterase Negative NEG      WBC 0-4 0 - 4 /hpf    RBC 0-5 0 - 5 /hpf    Epithelial cells FEW FEW /lpf    Bacteria Negative NEG /hpf    UA:UC IF INDICATED CULTURE NOT INDICATED BY UA RESULT CNI      Hyaline cast 0-2 0 - 5 /lpf   MAGNESIUM    Collection Time: 12/29/21  1:07 PM   Result Value Ref Range    Magnesium 2.3 1.6 - 2.4 mg/dL   EKG, 12 LEAD, INITIAL    Collection Time: 12/29/21  1:32 PM   Result Value Ref Range Ventricular Rate 60 BPM    Atrial Rate 60 BPM    P-R Interval 162 ms    QRS Duration 96 ms    Q-T Interval 424 ms    QTC Calculation (Bezet) 424 ms    Calculated P Axis 10 degrees    Calculated R Axis -8 degrees    Calculated T Axis 24 degrees    Diagnosis       Normal sinus rhythm  Normal ECG  No previous ECGs available     BLOOD GAS, ARTERIAL    Collection Time: 12/29/21  2:16 PM   Result Value Ref Range    pH 7.43 7.35 - 7.45      PCO2 41 35 - 45 mmHg    PO2 90 80 - 100 mmHg    O2 SAT 97 92 - 97 %    BICARBONATE 26 22 - 26 mmol/L    BASE EXCESS 1.6 mmol/L    O2 METHOD ROOM AIR      Sample source ARTERIAL      SITE LEFT RADIAL      LIZABETH'S TEST YES           Assessment:       Active Problems:    * No active hospital problems. *      ASA4    Plan/Recommendations/Medical Decision Making:       Anesthetic Plan: GETA with invasive monitoring, post op ventilation and DIMITRI monitoring  Risks and benefits of the anesthetic plan discussed and agreed upon by the patient.       no contraindications  For one lung ventilation, large bore lines, DIMITRI  Signed By: Xiang Palmer MD  Date:           12/29/2021  Time:          2:26 PM

## 2021-12-29 NOTE — PERIOP NOTES
[facility]  Preoperative Instructions        Surgery Date 1/4/22          Time of Arrival TBD    1. On the day of your surgery, please report to the Surgical Services Registration Desk and sign in at your designated time. The Surgery Center is located to the right of the Emergency Room. 2. You must have someone with you to drive you home. You should not drive a car for 24 hours following surgery. Please make arrangements for a friend or family member to stay with you for the first 24 hours after your surgery. 3. Do not have anything to eat or drink (including water, gum, mints, coffee, juice) after midnight ?01/3/22? Isanti Chime ? This may not apply to medications prescribed by your physician. ?(Please note below the special instructions with medications to take the morning of your procedure.)    4. We recommend you do not drink any alcoholic beverages for 24 hours before and after your surgery. 5. Contact your surgeons office for instructions on the following medications: non-steroidal anti-inflammatory drugs (i.e. Advil, Aleve), vitamins, and supplements. (Some surgeons will want you to stop these medications prior to surgery and others may allow you to take them)  **If you are currently taking Plavix, Coumadin, Aspirin and/or other blood-thinning agents, contact your surgeon for instructions. ** Your surgeon will partner with the physician prescribing these medications to determine if it is safe to stop or if you need to continue taking. Please do not stop taking these medications without instructions from your surgeon    6. Wear comfortable clothes. Wear glasses instead of contacts. Do not bring any money or jewelry. Please bring picture ID, insurance card, and any prearranged co-payment or hospital payment. Do not wear make-up, particularly mascara the morning of your surgery. Do not wear nail polish, particularly if you are having foot /hand surgery.   Wear your hair loose or down, no ponytails, buns, lazaro pins or clips. All body piercings must be removed. Please shower with antibacterial soap for three consecutive days before and on the morning of surgery, but do not apply any lotions, powders or deodorants after the shower on the day of surgery. Please use a fresh towels after each shower. Please sleep in clean clothes and change bed linens the night before surgery. Please do not shave for 48 hours prior to surgery. Shaving of the face is acceptable. 7. You should understand that if you do not follow these instructions your surgery may be cancelled. If your physical condition changes (I.e. fever, cold or flu) please contact your surgeon as soon as possible. 8. It is important that you be on time. If a situation occurs where you may be late, please call [phone] (OR Holding Area). 9. If you have any questions and or problems, please call [phone] (Pre-admission Testing). 10. Your surgery time may be subject to change. You will receive a phone call the evening prior if your time changes. 11.  If having outpatient surgery, you must have someone to drive you here, stay with you during the duration of your stay, and to drive you home at time of discharge. Special Instructions: bring incentive spirometer with you to the hospital    TAKE ALL MEDICATIONS DAY OF SURGERY EXCEPT: bactroban nasal ointment and peridex swish and spit day of surgery only! I understand a pre-operative phone call will be made to verify my surgery time. In the event that I am not available, I give permission for a message to be left on my answering service and/or with another person?   Yes [phone]         ___________________      __________   _________    (Signature of Patient)             (Witness)                (Date and Time)

## 2021-12-29 NOTE — PERIOP NOTES
Incentive Spirometer        Using the incentive spirometer helps expand the small air sacs of your lungs, helps you breathe deeply, and helps improve your lung function. Use your incentive spirometer twice a day (10 breaths each time) prior to surgery. How to Use Your Incentive Spirometer:  1. Hold the incentive spirometer in an upright position. 2. Breathe out as usual.   3. Place the mouthpiece in your mouth and seal your lips tightly around it. 4. Take a deep breath. Breathe in slowly and as deeply as possible. Keep the blue flow rate guide between the arrows. 5. Hold your breath as long as possible. Then exhale slowly and allow the piston to fall to the bottom of the column. 6. Rest for a few seconds and repeat steps one through five at least 10 times. PAT Tidal Volume_______2500___________  x___1_____________  Date______12/29/21_________________    Melania De La Garzaler THE INCENTIVE SPIROMETER WITH YOU TO THE HOSPITAL ON THE DAY OF YOUR SURGERY. Opportunity given to ask and answer questions as well as to observe return demonstration.     Patient signature_____________________________    Witness____________________________

## 2021-12-29 NOTE — PERIOP NOTES
Hibiclens/Chlorhexidine    Preventing Infections Before and After  Your Surgery    IMPORTANT INSTRUCTIONS    Please read and follow these instructions carefully. If you are unable to comply with the below instructions your procedure will be cancelled. Every Night for Three (3) nights before your surgery:  1. Shower with an antibacterial soap, such as Dial, or the soap provided at your preassessment appointment. A shower is better than a bath for cleaning your skin. 2. If needed, ask someone to help you reach all areas of your body. Dont forget to clean your belly button with every shower. The night before your surgery: If you lose your Hibiclens/chlorhexidine please contact surgery center or you can purchase it at a local pharmacy  1. On the night before your surgery, shower with an antibacterial soap, such as Dial, or the soap provided at your preassessment appointment. 2. With one packet of Hibiclens/Chlorhexidine in hand, turn water off.  3. Apply Hibiclens antiseptic skin cleanser with a clean, freshly washed washcloth. ? Gently apply to your body from chin to toes (except the genital area) and especially the area(s) where your incision(s) will be. ? Leave Hibiclens/Chlorhexidine on your skin for at least 20 seconds. CAUTION: If needed, Hibiclens/chlorhexidine may be used to clean the folds of skin of the legs (such as in the area of the groin) and on your buttocks and hips. However, do not use Hibiclens/Chlorhexidine above the neck or in the genital area (your bottom) or put inside any area of your body. 4. Turn the water back on and rinse. 5. Dry gently with a clean, freshly washed towel. 6. After your shower, do not use any powder, deodorant, perfumes or lotion. 7. Use clean, freshly washed towels and washcloths every time you shower. 8. Wear clean, freshly washed pajamas to bed the night before surgery. 9. Sleep on clean, freshly washed sheets.   10. Do not allow pets to sleep in your bed with you. The Morning of your surgery:  1. Shower again thoroughly with an antibacterial soap, such as Dial or the soap provided at your preassessment appointment. If needed, ask someone for help to reach all areas of your body. Dont forget to clean your belly button! Rinse. 2. Dry gently with a clean, freshly washed towel. 3. After your shower, do not use any powder, deodorant, perfumes or lotion prior to surgery. 4. Put on clean, freshly washed clothing. Tips to help prevent infections after your surgery:  1. Protect your surgical wound from germs:  ? Hand washing is the most important thing you and your caregivers can do to prevent infections. ? Keep your bandage clean and dry! ? Do not touch your surgical wound. 2. Use clean, freshly washed towels and washcloths every time you shower; do not share bath linens with others. 3. Until your surgical wound is healed, wear clothing and sleep on bed linens each day that are clean and freshly washed. 4. Do not allow pets to sleep in your bed with you or touch your surgical wound. 5. Do not smoke  smoking delays wound healing. This may be a good time to stop smoking. 6. If you have diabetes, it is important for you to manage your blood sugar levels properly before your surgery as well as after your surgery. Poorly managed blood sugar levels slow down wound healing and prevent you from healing completely. If you lose your Hibiclens/chlorhexidine, please call the [facility], or it is available for purchase at your pharmacy.                ___________________      ___________________      ________________  (Signature of Patient)          (Witness)                   (Date and Time)

## 2021-12-29 NOTE — PERIOP NOTES
BRYSON Tate, called me after reviewing EKG and told me to tell patient we aren't going to prescribe the amiodarone pre op since his heart rate was lower. Informed pt and he verbalized understanding. Knows that he needs to go  bactroban and peridex today at his pharmacy and start taking on 01/02/22.

## 2021-12-30 ENCOUNTER — HOSPITAL ENCOUNTER (OUTPATIENT)
Dept: PREADMISSION TESTING | Age: 46
Discharge: HOME | End: 2021-12-30

## 2021-12-30 LAB
BACTERIA SPEC CULT: NORMAL
BACTERIA SPEC CULT: NORMAL
LEFT CCA DIST DIAS: 19.3 CM/S
LEFT CCA DIST SYS: 87.9 CM/S
LEFT CCA PROX DIAS: 23.5 CM/S
LEFT CCA PROX SYS: 116.1 CM/S
LEFT ECA DIAS: 25.68 CM/S
LEFT ECA SYS: 111.3 CM/S
LEFT ICA DIST DIAS: 32.2 CM/S
LEFT ICA DIST SYS: 95.2 CM/S
LEFT ICA MID DIAS: 30.5 CM/S
LEFT ICA MID SYS: 103.2 CM/S
LEFT ICA PROX DIAS: 21.9 CM/S
LEFT ICA PROX SYS: 89.2 CM/S
LEFT ICA/CCA SYS: 1.17
LEFT VERTEBRAL DIAS: 15.28 CM/S
LEFT VERTEBRAL SYS: 50.4 CM/S
RIGHT CCA DIST DIAS: 20.8 CM/S
RIGHT CCA DIST SYS: 82.2 CM/S
RIGHT CCA PROX DIAS: 30 CM/S
RIGHT CCA PROX SYS: 120.6 CM/S
RIGHT ECA DIAS: 25.67 CM/S
RIGHT ECA SYS: 109.6 CM/S
RIGHT ICA DIST DIAS: 32.1 CM/S
RIGHT ICA DIST SYS: 79 CM/S
RIGHT ICA MID DIAS: 35.4 CM/S
RIGHT ICA MID SYS: 88.7 CM/S
RIGHT ICA PROX DIAS: 22.5 CM/S
RIGHT ICA PROX SYS: 74.1 CM/S
RIGHT ICA/CCA SYS: 1.1
RIGHT VERTEBRAL DIAS: 16.37 CM/S
RIGHT VERTEBRAL SYS: 59.2 CM/S
SARS-COV-2, XPLCVT: NOT DETECTED
SERVICE CMNT-IMP: NORMAL
SOURCE, COVRS: NORMAL
VAS LEFT SUBCLAVIAN PROX EDV: 20.7 CM/S
VAS LEFT SUBCLAVIAN PROX PSV: 164.7 CM/S
VAS RIGHT SUBCLAVIAN PROX EDV: 13.7 CM/S
VAS RIGHT SUBCLAVIAN PROX PSV: 189.2 CM/S

## 2022-01-03 ENCOUNTER — ANESTHESIA EVENT (OUTPATIENT)
Dept: CARDIOTHORACIC SURGERY | Age: 47
DRG: 219 | End: 2022-01-03
Payer: COMMERCIAL

## 2022-01-05 RX ORDER — PROTAMINE SULFATE 10 MG/ML
250 INJECTION, SOLUTION INTRAVENOUS
Status: DISCONTINUED | OUTPATIENT
Start: 2022-01-06 | End: 2022-01-06

## 2022-01-05 RX ORDER — DESMOPRESSIN ACETATE 4 UG/ML
2 INJECTION, SOLUTION INTRAVENOUS; SUBCUTANEOUS ONCE
Status: DISCONTINUED | OUTPATIENT
Start: 2022-01-06 | End: 2022-01-06

## 2022-01-05 RX ORDER — NOREPINEPHRINE BITARTRATE/D5W 8 MG/250ML
2-16 PLASTIC BAG, INJECTION (ML) INTRAVENOUS
Status: DISCONTINUED | OUTPATIENT
Start: 2022-01-06 | End: 2022-01-06

## 2022-01-05 RX ORDER — DEXMEDETOMIDINE HYDROCHLORIDE 4 UG/ML
.1-1.5 INJECTION, SOLUTION INTRAVENOUS
Status: DISCONTINUED | OUTPATIENT
Start: 2022-01-06 | End: 2022-01-07

## 2022-01-05 RX ORDER — POTASSIUM CHLORIDE 29.8 MG/ML
20 INJECTION INTRAVENOUS ONCE
Status: DISCONTINUED | OUTPATIENT
Start: 2022-01-06 | End: 2022-01-06

## 2022-01-05 RX ORDER — DOPAMINE HYDROCHLORIDE 320 MG/100ML
0-20 INJECTION, SOLUTION INTRAVENOUS
Status: DISCONTINUED | OUTPATIENT
Start: 2022-01-06 | End: 2022-01-06

## 2022-01-05 RX ORDER — MAGNESIUM SULFATE HEPTAHYDRATE 40 MG/ML
2 INJECTION, SOLUTION INTRAVENOUS ONCE
Status: COMPLETED | OUTPATIENT
Start: 2022-01-06 | End: 2022-01-06

## 2022-01-05 RX ORDER — NITROGLYCERIN 20 MG/100ML
0-20 INJECTION INTRAVENOUS
Status: DISCONTINUED | OUTPATIENT
Start: 2022-01-06 | End: 2022-01-06

## 2022-01-05 RX ORDER — DOBUTAMINE HYDROCHLORIDE 200 MG/100ML
0-10 INJECTION INTRAVENOUS
Status: DISCONTINUED | OUTPATIENT
Start: 2022-01-06 | End: 2022-01-07

## 2022-01-06 ENCOUNTER — APPOINTMENT (OUTPATIENT)
Dept: GENERAL RADIOLOGY | Age: 47
DRG: 219 | End: 2022-01-06
Attending: PHYSICIAN ASSISTANT
Payer: COMMERCIAL

## 2022-01-06 ENCOUNTER — ANESTHESIA (OUTPATIENT)
Dept: CARDIOTHORACIC SURGERY | Age: 47
DRG: 219 | End: 2022-01-06
Payer: COMMERCIAL

## 2022-01-06 ENCOUNTER — HOSPITAL ENCOUNTER (INPATIENT)
Age: 47
LOS: 5 days | Discharge: HOME HEALTH | DRG: 219 | End: 2022-01-11
Attending: THORACIC SURGERY (CARDIOTHORACIC VASCULAR SURGERY) | Admitting: THORACIC SURGERY (CARDIOTHORACIC VASCULAR SURGERY)
Payer: COMMERCIAL

## 2022-01-06 ENCOUNTER — HOSPITAL ENCOUNTER (OUTPATIENT)
Dept: NON INVASIVE DIAGNOSTICS | Age: 47
Discharge: HOME | End: 2022-01-06
Attending: THORACIC SURGERY (CARDIOTHORACIC VASCULAR SURGERY)

## 2022-01-06 DIAGNOSIS — Z95.2 S/P AVR (AORTIC VALVE REPLACEMENT): ICD-10-CM

## 2022-01-06 LAB
ADMINISTERED INITIALS, ADMINIT: NORMAL
ALBUMIN SERPL-MCNC: 3.2 G/DL (ref 3.5–5)
ALBUMIN SERPL-MCNC: 3.7 G/DL (ref 3.5–5)
ALBUMIN/GLOB SERPL: 1.2 {RATIO} (ref 1.1–2.2)
ALBUMIN/GLOB SERPL: 1.4 {RATIO} (ref 1.1–2.2)
ALP SERPL-CCNC: 38 U/L (ref 45–117)
ALP SERPL-CCNC: 39 U/L (ref 45–117)
ALT SERPL-CCNC: 19 U/L (ref 12–78)
ALT SERPL-CCNC: 23 U/L (ref 12–78)
ANION GAP SERPL CALC-SCNC: 6 MMOL/L (ref 5–15)
ANION GAP SERPL CALC-SCNC: 7 MMOL/L (ref 5–15)
APTT PPP: 23.6 SEC (ref 22.1–31)
ARTERIAL PATENCY WRIST A: ABNORMAL
ARTERIAL PATENCY WRIST A: ABNORMAL
AST SERPL-CCNC: 37 U/L (ref 15–37)
AST SERPL-CCNC: 47 U/L (ref 15–37)
BASE DEFICIT BLDA-SCNC: 1.2 MMOL/L
BASE EXCESS BLDA CALC-SCNC: 1.3 MMOL/L
BASOPHILS # BLD: 0 K/UL (ref 0–0.1)
BASOPHILS NFR BLD: 0 % (ref 0–1)
BDY SITE: ABNORMAL
BDY SITE: ABNORMAL
BILIRUB SERPL-MCNC: 0.4 MG/DL (ref 0.2–1)
BILIRUB SERPL-MCNC: 0.8 MG/DL (ref 0.2–1)
BUN SERPL-MCNC: 14 MG/DL (ref 6–20)
BUN SERPL-MCNC: 16 MG/DL (ref 6–20)
BUN/CREAT SERPL: 11 (ref 12–20)
BUN/CREAT SERPL: 14 (ref 12–20)
CA-I BLD-SCNC: 1.28 MMOL/L (ref 1.13–1.32)
CALCIUM SERPL-MCNC: 8.8 MG/DL (ref 8.5–10.1)
CALCIUM SERPL-MCNC: 8.9 MG/DL (ref 8.5–10.1)
CHLORIDE SERPL-SCNC: 106 MMOL/L (ref 97–108)
CHLORIDE SERPL-SCNC: 106 MMOL/L (ref 97–108)
CO2 SERPL-SCNC: 27 MMOL/L (ref 21–32)
CO2 SERPL-SCNC: 27 MMOL/L (ref 21–32)
CREAT SERPL-MCNC: 1.13 MG/DL (ref 0.7–1.3)
CREAT SERPL-MCNC: 1.32 MG/DL (ref 0.7–1.3)
D50 ADMINISTERED, D50ADM: 0 ML
D50 ORDER, D50ORD: 0 ML
DIFFERENTIAL METHOD BLD: ABNORMAL
EOSINOPHIL # BLD: 0 K/UL (ref 0–0.4)
EOSINOPHIL NFR BLD: 0 % (ref 0–7)
ERYTHROCYTE [DISTWIDTH] IN BLOOD BY AUTOMATED COUNT: 12.3 % (ref 11.5–14.5)
FIO2 ON VENT: 50 %
FIO2 ON VENT: 80 %
GAS FLOW.O2 SETTING OXYMISER: 16 L/MIN
GLOBULIN SER CALC-MCNC: 2.6 G/DL (ref 2–4)
GLOBULIN SER CALC-MCNC: 2.7 G/DL (ref 2–4)
GLSCOM COMMENTS: NORMAL
GLUCOSE BLD STRIP.AUTO-MCNC: 103 MG/DL (ref 65–117)
GLUCOSE BLD STRIP.AUTO-MCNC: 107 MG/DL (ref 65–117)
GLUCOSE BLD STRIP.AUTO-MCNC: 107 MG/DL (ref 65–117)
GLUCOSE BLD STRIP.AUTO-MCNC: 108 MG/DL (ref 65–117)
GLUCOSE BLD STRIP.AUTO-MCNC: 111 MG/DL (ref 65–117)
GLUCOSE BLD STRIP.AUTO-MCNC: 113 MG/DL (ref 65–117)
GLUCOSE BLD STRIP.AUTO-MCNC: 113 MG/DL (ref 65–117)
GLUCOSE BLD STRIP.AUTO-MCNC: 118 MG/DL (ref 65–117)
GLUCOSE BLD STRIP.AUTO-MCNC: 141 MG/DL (ref 65–117)
GLUCOSE BLD STRIP.AUTO-MCNC: 90 MG/DL (ref 65–117)
GLUCOSE BLD STRIP.AUTO-MCNC: 98 MG/DL (ref 65–117)
GLUCOSE SERPL-MCNC: 113 MG/DL (ref 65–100)
GLUCOSE SERPL-MCNC: 134 MG/DL (ref 65–100)
GLUCOSE, GLC: 103 MG/DL
GLUCOSE, GLC: 104 MG/DL
GLUCOSE, GLC: 107 MG/DL
GLUCOSE, GLC: 107 MG/DL
GLUCOSE, GLC: 108 MG/DL
GLUCOSE, GLC: 111 MG/DL
GLUCOSE, GLC: 112 MG/DL
GLUCOSE, GLC: 113 MG/DL
GLUCOSE, GLC: 113 MG/DL
GLUCOSE, GLC: 118 MG/DL
GLUCOSE, GLC: 127 MG/DL
GLUCOSE, GLC: 129 MG/DL
GLUCOSE, GLC: 137 MG/DL
GLUCOSE, GLC: 141 MG/DL
GLUCOSE, GLC: 90 MG/DL
HCO3 BLDA-SCNC: 25 MMOL/L (ref 22–26)
HCO3 BLDA-SCNC: 27 MMOL/L (ref 22–26)
HCT VFR BLD AUTO: 34.9 % (ref 36.6–50.3)
HCT VFR BLD AUTO: 36.4 % (ref 36.6–50.3)
HGB BLD-MCNC: 12.2 G/DL (ref 12.1–17)
HGB BLD-MCNC: 12.8 G/DL (ref 12.1–17)
HIGH TARGET, HITG: 130 MG/DL
HIGH TARGET, HITG: 140 MG/DL
IMM GRANULOCYTES # BLD AUTO: 0.1 K/UL (ref 0–0.04)
IMM GRANULOCYTES NFR BLD AUTO: 1 % (ref 0–0.5)
INR PPP: 1.1 (ref 0.9–1.1)
INSULIN ADMINSTERED, INSADM: 0.6 UNITS/HOUR
INSULIN ADMINSTERED, INSADM: 1.3 UNITS/HOUR
INSULIN ADMINSTERED, INSADM: 1.3 UNITS/HOUR
INSULIN ADMINSTERED, INSADM: 1.4 UNITS/HOUR
INSULIN ADMINSTERED, INSADM: 1.5 UNITS/HOUR
INSULIN ADMINSTERED, INSADM: 1.6 UNITS/HOUR
INSULIN ADMINSTERED, INSADM: 1.7 UNITS/HOUR
INSULIN ADMINSTERED, INSADM: 2 UNITS/HOUR
INSULIN ADMINSTERED, INSADM: 2.1 UNITS/HOUR
INSULIN ADMINSTERED, INSADM: 2.3 UNITS/HOUR
INSULIN ADMINSTERED, INSADM: 2.4 UNITS/HOUR
INSULIN ORDER, INSORD: 0.6 UNITS/HOUR
INSULIN ORDER, INSORD: 1.3 UNITS/HOUR
INSULIN ORDER, INSORD: 1.3 UNITS/HOUR
INSULIN ORDER, INSORD: 1.4 UNITS/HOUR
INSULIN ORDER, INSORD: 1.5 UNITS/HOUR
INSULIN ORDER, INSORD: 1.6 UNITS/HOUR
INSULIN ORDER, INSORD: 1.7 UNITS/HOUR
INSULIN ORDER, INSORD: 2 UNITS/HOUR
INSULIN ORDER, INSORD: 2.1 UNITS/HOUR
INSULIN ORDER, INSORD: 2.3 UNITS/HOUR
INSULIN ORDER, INSORD: 2.4 UNITS/HOUR
LOW TARGET, LOT: 100 MG/DL
LOW TARGET, LOT: 95 MG/DL
LYMPHOCYTES # BLD: 1 K/UL (ref 0.8–3.5)
LYMPHOCYTES NFR BLD: 8 % (ref 12–49)
MAGNESIUM SERPL-MCNC: 2.8 MG/DL (ref 1.6–2.4)
MAGNESIUM SERPL-MCNC: 3.4 MG/DL (ref 1.6–2.4)
MCH RBC QN AUTO: 32.7 PG (ref 26–34)
MCHC RBC AUTO-ENTMCNC: 35 G/DL (ref 30–36.5)
MCV RBC AUTO: 93.6 FL (ref 80–99)
MINUTES UNTIL NEXT BG, NBG: 60 MIN
MONOCYTES # BLD: 0.6 K/UL (ref 0–1)
MONOCYTES NFR BLD: 5 % (ref 5–13)
MULTIPLIER, MUL: 0.02
MULTIPLIER, MUL: 0.03
NEUTS SEG # BLD: 10.2 K/UL (ref 1.8–8)
NEUTS SEG NFR BLD: 86 % (ref 32–75)
NRBC # BLD: 0 K/UL (ref 0–0.01)
NRBC BLD-RTO: 0 PER 100 WBC
ORDER INITIALS, ORDINIT: NORMAL
PCO2 BLDA: 46 MMHG (ref 35–45)
PCO2 BLDA: 49 MMHG (ref 35–45)
PEEP RESPIRATORY: 5 CM[H2O]
PEEP RESPIRATORY: 5 CM[H2O]
PH BLDA: 7.33 [PH] (ref 7.35–7.45)
PH BLDA: 7.39 [PH] (ref 7.35–7.45)
PLATELET # BLD AUTO: 134 K/UL (ref 150–400)
PMV BLD AUTO: 10.2 FL (ref 8.9–12.9)
PO2 BLDA: 148 MMHG (ref 80–100)
PO2 BLDA: 257 MMHG (ref 80–100)
POTASSIUM SERPL-SCNC: 4 MMOL/L (ref 3.5–5.1)
POTASSIUM SERPL-SCNC: 4.5 MMOL/L (ref 3.5–5.1)
PRESSURE SUPPORT SETTING VENT: 10 CM[H2O]
PROT SERPL-MCNC: 5.8 G/DL (ref 6.4–8.2)
PROT SERPL-MCNC: 6.4 G/DL (ref 6.4–8.2)
PROTHROMBIN TIME: 11.6 SEC (ref 9–11.1)
RBC # BLD AUTO: 3.73 M/UL (ref 4.1–5.7)
SAO2 % BLD: 100 % (ref 92–97)
SAO2 % BLD: 99 % (ref 92–97)
SAO2% DEVICE SAO2% SENSOR NAME: ABNORMAL
SAO2% DEVICE SAO2% SENSOR NAME: ABNORMAL
SERVICE CMNT-IMP: ABNORMAL
SERVICE CMNT-IMP: ABNORMAL
SERVICE CMNT-IMP: NORMAL
SODIUM SERPL-SCNC: 139 MMOL/L (ref 136–145)
SODIUM SERPL-SCNC: 140 MMOL/L (ref 136–145)
SPECIMEN SITE: ABNORMAL
SPECIMEN SITE: ABNORMAL
THERAPEUTIC RANGE,PTTT: NORMAL SECS (ref 58–77)
VENTILATION MODE VENT: ABNORMAL
VENTILATION MODE VENT: ABNORMAL
VT SETTING VENT: 520 ML
WBC # BLD AUTO: 11.9 K/UL (ref 4.1–11.1)

## 2022-01-06 PROCEDURE — 2709999900 HC NON-CHARGEABLE SUPPLY: Performed by: THORACIC SURGERY (CARDIOTHORACIC VASCULAR SURGERY)

## 2022-01-06 PROCEDURE — 77030002912 HC SUT ETHBND J&J -A: Performed by: THORACIC SURGERY (CARDIOTHORACIC VASCULAR SURGERY)

## 2022-01-06 PROCEDURE — 77030008771 HC TU NG SALEM SUMP -A: Performed by: THORACIC SURGERY (CARDIOTHORACIC VASCULAR SURGERY)

## 2022-01-06 PROCEDURE — 74011636637 HC RX REV CODE- 636/637: Performed by: THORACIC SURGERY (CARDIOTHORACIC VASCULAR SURGERY)

## 2022-01-06 PROCEDURE — 77030010506 HC ADH BIOGLU CRYO -G: Performed by: THORACIC SURGERY (CARDIOTHORACIC VASCULAR SURGERY)

## 2022-01-06 PROCEDURE — C1751 CATH, INF, PER/CENT/MIDLINE: HCPCS | Performed by: NURSE ANESTHETIST, CERTIFIED REGISTERED

## 2022-01-06 PROCEDURE — 74011000258 HC RX REV CODE- 258: Performed by: THORACIC SURGERY (CARDIOTHORACIC VASCULAR SURGERY)

## 2022-01-06 PROCEDURE — 74011000250 HC RX REV CODE- 250: Performed by: THORACIC SURGERY (CARDIOTHORACIC VASCULAR SURGERY)

## 2022-01-06 PROCEDURE — 74011250636 HC RX REV CODE- 250/636: Performed by: THORACIC SURGERY (CARDIOTHORACIC VASCULAR SURGERY)

## 2022-01-06 PROCEDURE — 74011000250 HC RX REV CODE- 250: Performed by: NURSE ANESTHETIST, CERTIFIED REGISTERED

## 2022-01-06 PROCEDURE — C1713 ANCHOR/SCREW BN/BN,TIS/BN: HCPCS | Performed by: THORACIC SURGERY (CARDIOTHORACIC VASCULAR SURGERY)

## 2022-01-06 PROCEDURE — 74011000258 HC RX REV CODE- 258: Performed by: PHYSICIAN ASSISTANT

## 2022-01-06 PROCEDURE — 77030008771 HC TU NG SALEM SUMP -A: Performed by: NURSE ANESTHETIST, CERTIFIED REGISTERED

## 2022-01-06 PROCEDURE — 77030014008 HC SPNG HEMSTAT J&J -C: Performed by: THORACIC SURGERY (CARDIOTHORACIC VASCULAR SURGERY)

## 2022-01-06 PROCEDURE — 77030002986 HC SUT PROL J&J -A: Performed by: THORACIC SURGERY (CARDIOTHORACIC VASCULAR SURGERY)

## 2022-01-06 PROCEDURE — 82962 GLUCOSE BLOOD TEST: CPT

## 2022-01-06 PROCEDURE — 77030040090 HC VLV AORT MECH CRYO -I1: Performed by: THORACIC SURGERY (CARDIOTHORACIC VASCULAR SURGERY)

## 2022-01-06 PROCEDURE — 77030010389 HC WRE ATR PACE AEMC -B: Performed by: THORACIC SURGERY (CARDIOTHORACIC VASCULAR SURGERY)

## 2022-01-06 PROCEDURE — 77030005401 HC CATH RAD ARRO -A: Performed by: NURSE ANESTHETIST, CERTIFIED REGISTERED

## 2022-01-06 PROCEDURE — 77030037878 HC DRSG MEPILEX >48IN BORD MOLN -B: Performed by: THORACIC SURGERY (CARDIOTHORACIC VASCULAR SURGERY)

## 2022-01-06 PROCEDURE — 77030041244 HC CBL PACE EXT TEMP REMG -B: Performed by: THORACIC SURGERY (CARDIOTHORACIC VASCULAR SURGERY)

## 2022-01-06 PROCEDURE — 74011250636 HC RX REV CODE- 250/636: Performed by: ANESTHESIOLOGY

## 2022-01-06 PROCEDURE — 77030003029 HC SUT VCRL J&J -B: Performed by: THORACIC SURGERY (CARDIOTHORACIC VASCULAR SURGERY)

## 2022-01-06 PROCEDURE — 77030013798 HC KT TRNSDUC PRSSR EDWD -B: Performed by: THORACIC SURGERY (CARDIOTHORACIC VASCULAR SURGERY)

## 2022-01-06 PROCEDURE — 85025 COMPLETE CBC W/AUTO DIFF WBC: CPT

## 2022-01-06 PROCEDURE — 77030006247 HC LD PCMKR MYOCRD BPLR TEMP MEDT -B: Performed by: THORACIC SURGERY (CARDIOTHORACIC VASCULAR SURGERY)

## 2022-01-06 PROCEDURE — 94002 VENT MGMT INPAT INIT DAY: CPT

## 2022-01-06 PROCEDURE — 5A1221Z PERFORMANCE OF CARDIAC OUTPUT, CONTINUOUS: ICD-10-PCS | Performed by: THORACIC SURGERY (CARDIOTHORACIC VASCULAR SURGERY)

## 2022-01-06 PROCEDURE — 74011000250 HC RX REV CODE- 250: Performed by: PHYSICIAN ASSISTANT

## 2022-01-06 PROCEDURE — 77030011235 HC DRN PERCRD SUMP MEDT -B: Performed by: THORACIC SURGERY (CARDIOTHORACIC VASCULAR SURGERY)

## 2022-01-06 PROCEDURE — 77030013798 HC KT TRNSDUC PRSSR EDWD -B: Performed by: NURSE ANESTHETIST, CERTIFIED REGISTERED

## 2022-01-06 PROCEDURE — 88311 DECALCIFY TISSUE: CPT

## 2022-01-06 PROCEDURE — 74011250637 HC RX REV CODE- 250/637: Performed by: ANESTHESIOLOGY

## 2022-01-06 PROCEDURE — 88305 TISSUE EXAM BY PATHOLOGIST: CPT

## 2022-01-06 PROCEDURE — 77030019908 HC STETH ESOPH SIMS -A: Performed by: NURSE ANESTHETIST, CERTIFIED REGISTERED

## 2022-01-06 PROCEDURE — 77030040504 HC DRN WND MDII -B: Performed by: THORACIC SURGERY (CARDIOTHORACIC VASCULAR SURGERY)

## 2022-01-06 PROCEDURE — 33405 REPLACEMENT AORTIC VALVE OPN: CPT | Performed by: PHYSICIAN ASSISTANT

## 2022-01-06 PROCEDURE — 77030007667 HC INSRT SUT HLD MEDT -B: Performed by: THORACIC SURGERY (CARDIOTHORACIC VASCULAR SURGERY)

## 2022-01-06 PROCEDURE — 77030014491 HC PLEDG PTFE BARD -A: Performed by: THORACIC SURGERY (CARDIOTHORACIC VASCULAR SURGERY)

## 2022-01-06 PROCEDURE — 77030010797: Performed by: THORACIC SURGERY (CARDIOTHORACIC VASCULAR SURGERY)

## 2022-01-06 PROCEDURE — 77030018986 HC SUT ETHBND4 J&J -B: Performed by: THORACIC SURGERY (CARDIOTHORACIC VASCULAR SURGERY)

## 2022-01-06 PROCEDURE — 77030006994: Performed by: THORACIC SURGERY (CARDIOTHORACIC VASCULAR SURGERY)

## 2022-01-06 PROCEDURE — 74011250636 HC RX REV CODE- 250/636: Performed by: NURSE ANESTHETIST, CERTIFIED REGISTERED

## 2022-01-06 PROCEDURE — 76010000114 HC CV SURG 4 TO 4.5 HR: Performed by: THORACIC SURGERY (CARDIOTHORACIC VASCULAR SURGERY)

## 2022-01-06 PROCEDURE — 83735 ASSAY OF MAGNESIUM: CPT

## 2022-01-06 PROCEDURE — 85610 PROTHROMBIN TIME: CPT

## 2022-01-06 PROCEDURE — 77030020167 HC PERF SET MULT MEDT -B: Performed by: THORACIC SURGERY (CARDIOTHORACIC VASCULAR SURGERY)

## 2022-01-06 PROCEDURE — P9047 ALBUMIN (HUMAN), 25%, 50ML: HCPCS | Performed by: THORACIC SURGERY (CARDIOTHORACIC VASCULAR SURGERY)

## 2022-01-06 PROCEDURE — 02RF0JZ REPLACEMENT OF AORTIC VALVE WITH SYNTHETIC SUBSTITUTE, OPEN APPROACH: ICD-10-PCS | Performed by: THORACIC SURGERY (CARDIOTHORACIC VASCULAR SURGERY)

## 2022-01-06 PROCEDURE — 71045 X-RAY EXAM CHEST 1 VIEW: CPT

## 2022-01-06 PROCEDURE — 74011250636 HC RX REV CODE- 250/636: Performed by: PHYSICIAN ASSISTANT

## 2022-01-06 PROCEDURE — 77030003010 HC SUT SURG STL J&J -B: Performed by: THORACIC SURGERY (CARDIOTHORACIC VASCULAR SURGERY)

## 2022-01-06 PROCEDURE — 77030026438 HC STYL ET INTUB CARD -A: Performed by: NURSE ANESTHETIST, CERTIFIED REGISTERED

## 2022-01-06 PROCEDURE — 77030005513 HC CATH URETH FOL11 MDII -B: Performed by: THORACIC SURGERY (CARDIOTHORACIC VASCULAR SURGERY)

## 2022-01-06 PROCEDURE — 77030022521 HC CATH PERF VENT EDWD -B: Performed by: THORACIC SURGERY (CARDIOTHORACIC VASCULAR SURGERY)

## 2022-01-06 PROCEDURE — 77030020061 HC IV BLD WRMR ADMIN SET 3M -B: Performed by: NURSE ANESTHETIST, CERTIFIED REGISTERED

## 2022-01-06 PROCEDURE — 77030012390 HC DRN CHST BTL GTNG -B: Performed by: THORACIC SURGERY (CARDIOTHORACIC VASCULAR SURGERY)

## 2022-01-06 PROCEDURE — 77030010505 HC ADH BIOGLU CRYO -F: Performed by: THORACIC SURGERY (CARDIOTHORACIC VASCULAR SURGERY)

## 2022-01-06 PROCEDURE — 74011250637 HC RX REV CODE- 250/637: Performed by: PHYSICIAN ASSISTANT

## 2022-01-06 PROCEDURE — 33405 REPLACEMENT AORTIC VALVE OPN: CPT | Performed by: THORACIC SURGERY (CARDIOTHORACIC VASCULAR SURGERY)

## 2022-01-06 PROCEDURE — 82803 BLOOD GASES ANY COMBINATION: CPT

## 2022-01-06 PROCEDURE — 77030003020 HC SUT TICRN COVD -A: Performed by: THORACIC SURGERY (CARDIOTHORACIC VASCULAR SURGERY)

## 2022-01-06 PROCEDURE — 77030018729 HC ELECTRD DEFIB PAD CARD -B: Performed by: THORACIC SURGERY (CARDIOTHORACIC VASCULAR SURGERY)

## 2022-01-06 PROCEDURE — 65620000000 HC RM CCU GENERAL

## 2022-01-06 PROCEDURE — 76060000039 HC ANESTHESIA 4 TO 4.5 HR: Performed by: THORACIC SURGERY (CARDIOTHORACIC VASCULAR SURGERY)

## 2022-01-06 PROCEDURE — 74011250636 HC RX REV CODE- 250/636: Performed by: NURSE PRACTITIONER

## 2022-01-06 PROCEDURE — 77030010819: Performed by: THORACIC SURGERY (CARDIOTHORACIC VASCULAR SURGERY)

## 2022-01-06 PROCEDURE — 77030002996 HC SUT SLK J&J -A: Performed by: THORACIC SURGERY (CARDIOTHORACIC VASCULAR SURGERY)

## 2022-01-06 PROCEDURE — 85730 THROMBOPLASTIN TIME PARTIAL: CPT

## 2022-01-06 PROCEDURE — 77030019905 HC CATH URETH INTMIT MDII -A: Performed by: THORACIC SURGERY (CARDIOTHORACIC VASCULAR SURGERY)

## 2022-01-06 PROCEDURE — 77030002933 HC SUT MCRYL J&J -A: Performed by: THORACIC SURGERY (CARDIOTHORACIC VASCULAR SURGERY)

## 2022-01-06 PROCEDURE — 74011000250 HC RX REV CODE- 250: Performed by: NURSE PRACTITIONER

## 2022-01-06 PROCEDURE — 93005 ELECTROCARDIOGRAM TRACING: CPT

## 2022-01-06 PROCEDURE — 77030041076 HC DRSG AG OPTICELL MDII -A: Performed by: THORACIC SURGERY (CARDIOTHORACIC VASCULAR SURGERY)

## 2022-01-06 PROCEDURE — 36415 COLL VENOUS BLD VENIPUNCTURE: CPT

## 2022-01-06 PROCEDURE — 77030033150: Performed by: THORACIC SURGERY (CARDIOTHORACIC VASCULAR SURGERY)

## 2022-01-06 PROCEDURE — 80053 COMPREHEN METABOLIC PANEL: CPT

## 2022-01-06 PROCEDURE — 77030008684 HC TU ET CUF COVD -B: Performed by: NURSE ANESTHETIST, CERTIFIED REGISTERED

## 2022-01-06 PROCEDURE — P9045 ALBUMIN (HUMAN), 5%, 250 ML: HCPCS | Performed by: PHYSICIAN ASSISTANT

## 2022-01-06 PROCEDURE — 85018 HEMOGLOBIN: CPT

## 2022-01-06 PROCEDURE — 77030018314 HC SEAL FBRN TISSL2 BAXT -F: Performed by: THORACIC SURGERY (CARDIOTHORACIC VASCULAR SURGERY)

## 2022-01-06 DEVICE — LEAD PCMKR MYOCARDL BPLR TEMP. --: Type: IMPLANTABLE DEVICE | Status: FUNCTIONAL

## 2022-01-06 DEVICE — IMPLANTABLE DEVICE: Type: IMPLANTABLE DEVICE | Site: AORTIC VALVE | Status: FUNCTIONAL

## 2022-01-06 DEVICE — PUTTY BONE HEMSTAT ABSORB MTRX 2.0GRAM: Type: IMPLANTABLE DEVICE | Status: FUNCTIONAL

## 2022-01-06 RX ORDER — HYDROMORPHONE HCL/0.9% NACL/PF 0.5 MG/ML
PLASTIC BAG, INJECTION (ML) INTRAVENOUS
Status: DISCONTINUED | OUTPATIENT
Start: 2022-01-06 | End: 2022-01-07

## 2022-01-06 RX ORDER — HEPARIN SODIUM 1000 [USP'U]/ML
INJECTION, SOLUTION INTRAVENOUS; SUBCUTANEOUS AS NEEDED
Status: DISCONTINUED | OUTPATIENT
Start: 2022-01-06 | End: 2022-01-06 | Stop reason: HOSPADM

## 2022-01-06 RX ORDER — INSULIN LISPRO 100 [IU]/ML
INJECTION, SOLUTION INTRAVENOUS; SUBCUTANEOUS
Status: DISCONTINUED | OUTPATIENT
Start: 2022-01-08 | End: 2022-01-10

## 2022-01-06 RX ORDER — MIDAZOLAM HYDROCHLORIDE 1 MG/ML
INJECTION, SOLUTION INTRAMUSCULAR; INTRAVENOUS AS NEEDED
Status: DISCONTINUED | OUTPATIENT
Start: 2022-01-06 | End: 2022-01-06 | Stop reason: HOSPADM

## 2022-01-06 RX ORDER — NEOSTIGMINE METHYLSULFATE 1 MG/ML
INJECTION, SOLUTION INTRAVENOUS AS NEEDED
Status: DISCONTINUED | OUTPATIENT
Start: 2022-01-06 | End: 2022-01-06 | Stop reason: HOSPADM

## 2022-01-06 RX ORDER — ACETAMINOPHEN 500 MG
1000 TABLET ORAL EVERY 6 HOURS
Status: DISPENSED | OUTPATIENT
Start: 2022-01-06 | End: 2022-01-08

## 2022-01-06 RX ORDER — SODIUM CHLORIDE 0.9 % (FLUSH) 0.9 %
5-40 SYRINGE (ML) INJECTION AS NEEDED
Status: DISCONTINUED | OUTPATIENT
Start: 2022-01-06 | End: 2022-01-07

## 2022-01-06 RX ORDER — MIDAZOLAM HYDROCHLORIDE 1 MG/ML
1 INJECTION, SOLUTION INTRAMUSCULAR; INTRAVENOUS
Status: DISCONTINUED | OUTPATIENT
Start: 2022-01-06 | End: 2022-01-07

## 2022-01-06 RX ORDER — LIDOCAINE HYDROCHLORIDE 20 MG/ML
INJECTION, SOLUTION EPIDURAL; INFILTRATION; INTRACAUDAL; PERINEURAL AS NEEDED
Status: DISCONTINUED | OUTPATIENT
Start: 2022-01-06 | End: 2022-01-06 | Stop reason: HOSPADM

## 2022-01-06 RX ORDER — GUAIFENESIN 100 MG/5ML
81 LIQUID (ML) ORAL DAILY
Status: DISCONTINUED | OUTPATIENT
Start: 2022-01-07 | End: 2022-01-11 | Stop reason: HOSPADM

## 2022-01-06 RX ORDER — ALBUTEROL SULFATE 0.83 MG/ML
2.5 SOLUTION RESPIRATORY (INHALATION)
Status: DISCONTINUED | OUTPATIENT
Start: 2022-01-06 | End: 2022-01-11 | Stop reason: HOSPADM

## 2022-01-06 RX ORDER — MUPIROCIN 20 MG/G
OINTMENT TOPICAL 2 TIMES DAILY
Status: COMPLETED | OUTPATIENT
Start: 2022-01-06 | End: 2022-01-11

## 2022-01-06 RX ORDER — CHLORHEXIDINE GLUCONATE 1.2 MG/ML
10 RINSE ORAL EVERY 12 HOURS
Status: DISCONTINUED | OUTPATIENT
Start: 2022-01-06 | End: 2022-01-11 | Stop reason: HOSPADM

## 2022-01-06 RX ORDER — ALBUMIN HUMAN 50 G/1000ML
12.5 SOLUTION INTRAVENOUS
Status: COMPLETED | OUTPATIENT
Start: 2022-01-06 | End: 2022-01-07

## 2022-01-06 RX ORDER — SUFENTANIL CITRATE 50 UG/ML
INJECTION EPIDURAL; INTRAVENOUS AS NEEDED
Status: DISCONTINUED | OUTPATIENT
Start: 2022-01-06 | End: 2022-01-06 | Stop reason: HOSPADM

## 2022-01-06 RX ORDER — MIDAZOLAM HYDROCHLORIDE 1 MG/ML
INJECTION, SOLUTION INTRAMUSCULAR; INTRAVENOUS
Status: DISPENSED
Start: 2022-01-06 | End: 2022-01-07

## 2022-01-06 RX ORDER — DESMOPRESSIN ACETATE 4 UG/ML
INJECTION, SOLUTION INTRAVENOUS; SUBCUTANEOUS AS NEEDED
Status: DISCONTINUED | OUTPATIENT
Start: 2022-01-06 | End: 2022-01-06 | Stop reason: HOSPADM

## 2022-01-06 RX ORDER — MORPHINE SULFATE 4 MG/ML
4 INJECTION INTRAVENOUS
Status: DISCONTINUED | OUTPATIENT
Start: 2022-01-06 | End: 2022-01-07

## 2022-01-06 RX ORDER — FENTANYL CITRATE 50 UG/ML
INJECTION, SOLUTION INTRAMUSCULAR; INTRAVENOUS AS NEEDED
Status: DISCONTINUED | OUTPATIENT
Start: 2022-01-06 | End: 2022-01-06 | Stop reason: HOSPADM

## 2022-01-06 RX ORDER — INSULIN LISPRO 100 [IU]/ML
INJECTION, SOLUTION INTRAVENOUS; SUBCUTANEOUS
Status: ACTIVE | OUTPATIENT
Start: 2022-01-07 | End: 2022-01-08

## 2022-01-06 RX ORDER — SUFENTANIL CITRATE 50 UG/ML
INJECTION EPIDURAL; INTRAVENOUS
Status: DISCONTINUED | OUTPATIENT
Start: 2022-01-06 | End: 2022-01-06 | Stop reason: HOSPADM

## 2022-01-06 RX ORDER — PROTAMINE SULFATE 10 MG/ML
INJECTION, SOLUTION INTRAVENOUS AS NEEDED
Status: DISCONTINUED | OUTPATIENT
Start: 2022-01-06 | End: 2022-01-06 | Stop reason: HOSPADM

## 2022-01-06 RX ORDER — MAGNESIUM SULFATE 1 G/100ML
1 INJECTION INTRAVENOUS AS NEEDED
Status: DISCONTINUED | OUTPATIENT
Start: 2022-01-06 | End: 2022-01-07

## 2022-01-06 RX ORDER — ONDANSETRON 2 MG/ML
4 INJECTION INTRAMUSCULAR; INTRAVENOUS
Status: DISCONTINUED | OUTPATIENT
Start: 2022-01-06 | End: 2022-01-11 | Stop reason: HOSPADM

## 2022-01-06 RX ORDER — MAGNESIUM SULFATE 100 %
4 CRYSTALS MISCELLANEOUS AS NEEDED
Status: DISCONTINUED | OUTPATIENT
Start: 2022-01-06 | End: 2022-01-10

## 2022-01-06 RX ORDER — SODIUM CHLORIDE 0.9 % (FLUSH) 0.9 %
5-40 SYRINGE (ML) INJECTION EVERY 8 HOURS
Status: DISCONTINUED | OUTPATIENT
Start: 2022-01-06 | End: 2022-01-06

## 2022-01-06 RX ORDER — GABAPENTIN 100 MG/1
100 CAPSULE ORAL 3 TIMES DAILY
Status: DISCONTINUED | OUTPATIENT
Start: 2022-01-06 | End: 2022-01-08

## 2022-01-06 RX ORDER — LANOLIN ALCOHOL/MO/W.PET/CERES
3-6 CREAM (GRAM) TOPICAL
Status: DISCONTINUED | OUTPATIENT
Start: 2022-01-06 | End: 2022-01-11 | Stop reason: HOSPADM

## 2022-01-06 RX ORDER — MORPHINE SULFATE 4 MG/ML
INJECTION, SOLUTION INTRAMUSCULAR; INTRAVENOUS
Status: DISPENSED
Start: 2022-01-06 | End: 2022-01-07

## 2022-01-06 RX ORDER — ACETAMINOPHEN 325 MG/1
650 TABLET ORAL
Status: DISCONTINUED | OUTPATIENT
Start: 2022-01-06 | End: 2022-01-11 | Stop reason: HOSPADM

## 2022-01-06 RX ORDER — FAMOTIDINE 20 MG/1
20 TABLET, FILM COATED ORAL EVERY 12 HOURS
Status: DISCONTINUED | OUTPATIENT
Start: 2022-01-07 | End: 2022-01-11 | Stop reason: HOSPADM

## 2022-01-06 RX ORDER — OXYCODONE HYDROCHLORIDE 5 MG/1
10 TABLET ORAL
Status: DISCONTINUED | OUTPATIENT
Start: 2022-01-06 | End: 2022-01-11 | Stop reason: HOSPADM

## 2022-01-06 RX ORDER — SODIUM CHLORIDE 0.9 % (FLUSH) 0.9 %
5-40 SYRINGE (ML) INJECTION AS NEEDED
Status: DISCONTINUED | OUTPATIENT
Start: 2022-01-06 | End: 2022-01-06

## 2022-01-06 RX ORDER — FENTANYL CITRATE/PF 1500MCG/30
PATIENT CONTROLLED ANALGESIA SYRINGE INTRAVENOUS CONTINUOUS
Status: DISCONTINUED | OUTPATIENT
Start: 2022-01-06 | End: 2022-01-06 | Stop reason: DRUGHIGH

## 2022-01-06 RX ORDER — SODIUM CHLORIDE 9 MG/ML
10 INJECTION, SOLUTION INTRAVENOUS CONTINUOUS
Status: DISCONTINUED | OUTPATIENT
Start: 2022-01-06 | End: 2022-01-07

## 2022-01-06 RX ORDER — LANOLIN ALCOHOL/MO/W.PET/CERES
400 CREAM (GRAM) TOPICAL 2 TIMES DAILY
Status: DISCONTINUED | OUTPATIENT
Start: 2022-01-07 | End: 2022-01-11 | Stop reason: HOSPADM

## 2022-01-06 RX ORDER — SODIUM CHLORIDE 0.9 % (FLUSH) 0.9 %
5-40 SYRINGE (ML) INJECTION EVERY 8 HOURS
Status: DISCONTINUED | OUTPATIENT
Start: 2022-01-06 | End: 2022-01-11 | Stop reason: HOSPADM

## 2022-01-06 RX ORDER — AMIODARONE HYDROCHLORIDE 200 MG/1
400 TABLET ORAL EVERY 12 HOURS
Status: DISCONTINUED | OUTPATIENT
Start: 2022-01-07 | End: 2022-01-07

## 2022-01-06 RX ORDER — FACIAL-BODY WIPES
10 EACH TOPICAL DAILY PRN
Status: DISCONTINUED | OUTPATIENT
Start: 2022-01-06 | End: 2022-01-11 | Stop reason: HOSPADM

## 2022-01-06 RX ORDER — ROCURONIUM BROMIDE 10 MG/ML
INJECTION, SOLUTION INTRAVENOUS AS NEEDED
Status: DISCONTINUED | OUTPATIENT
Start: 2022-01-06 | End: 2022-01-06 | Stop reason: HOSPADM

## 2022-01-06 RX ORDER — PROPOFOL 10 MG/ML
INJECTION, EMULSION INTRAVENOUS AS NEEDED
Status: DISCONTINUED | OUTPATIENT
Start: 2022-01-06 | End: 2022-01-06 | Stop reason: HOSPADM

## 2022-01-06 RX ORDER — DEXTROSE 50 % IN WATER (D50W) INTRAVENOUS SYRINGE
12.5-25 AS NEEDED
Status: DISCONTINUED | OUTPATIENT
Start: 2022-01-06 | End: 2022-01-10

## 2022-01-06 RX ORDER — DIPHENHYDRAMINE HCL 25 MG
25 CAPSULE ORAL
Status: DISCONTINUED | OUTPATIENT
Start: 2022-01-06 | End: 2022-01-11 | Stop reason: HOSPADM

## 2022-01-06 RX ORDER — NALOXONE HYDROCHLORIDE 0.4 MG/ML
0.4 INJECTION, SOLUTION INTRAMUSCULAR; INTRAVENOUS; SUBCUTANEOUS AS NEEDED
Status: DISCONTINUED | OUTPATIENT
Start: 2022-01-06 | End: 2022-01-11 | Stop reason: HOSPADM

## 2022-01-06 RX ORDER — SODIUM CHLORIDE, SODIUM LACTATE, POTASSIUM CHLORIDE, CALCIUM CHLORIDE 600; 310; 30; 20 MG/100ML; MG/100ML; MG/100ML; MG/100ML
25 INJECTION, SOLUTION INTRAVENOUS CONTINUOUS
Status: DISCONTINUED | OUTPATIENT
Start: 2022-01-06 | End: 2022-01-06

## 2022-01-06 RX ORDER — INSULIN GLARGINE 100 [IU]/ML
1-50 INJECTION, SOLUTION SUBCUTANEOUS
Status: ACTIVE | OUTPATIENT
Start: 2022-01-06 | End: 2022-01-07

## 2022-01-06 RX ORDER — HEPARIN SODIUM 1000 [USP'U]/ML
INJECTION, SOLUTION INTRAVENOUS; SUBCUTANEOUS AS NEEDED
Status: DISCONTINUED | OUTPATIENT
Start: 2022-01-06 | End: 2022-01-11 | Stop reason: HOSPADM

## 2022-01-06 RX ORDER — POLYETHYLENE GLYCOL 3350 17 G/17G
17 POWDER, FOR SOLUTION ORAL DAILY
Status: DISCONTINUED | OUTPATIENT
Start: 2022-01-07 | End: 2022-01-11 | Stop reason: HOSPADM

## 2022-01-06 RX ORDER — SODIUM CHLORIDE 450 MG/100ML
10 INJECTION, SOLUTION INTRAVENOUS CONTINUOUS
Status: DISCONTINUED | OUTPATIENT
Start: 2022-01-06 | End: 2022-01-07

## 2022-01-06 RX ORDER — AMOXICILLIN 250 MG
1 CAPSULE ORAL 2 TIMES DAILY
Status: DISCONTINUED | OUTPATIENT
Start: 2022-01-07 | End: 2022-01-11 | Stop reason: HOSPADM

## 2022-01-06 RX ORDER — GLYCOPYRROLATE 0.2 MG/ML
INJECTION INTRAMUSCULAR; INTRAVENOUS AS NEEDED
Status: DISCONTINUED | OUTPATIENT
Start: 2022-01-06 | End: 2022-01-06 | Stop reason: HOSPADM

## 2022-01-06 RX ORDER — OXYCODONE HYDROCHLORIDE 5 MG/1
5 TABLET ORAL
Status: DISCONTINUED | OUTPATIENT
Start: 2022-01-06 | End: 2022-01-11 | Stop reason: HOSPADM

## 2022-01-06 RX ADMIN — MIDAZOLAM 1 MG: 1 INJECTION INTRAMUSCULAR; INTRAVENOUS at 12:58

## 2022-01-06 RX ADMIN — SODIUM CHLORIDE, PRESERVATIVE FREE 10 ML: 5 INJECTION INTRAVENOUS at 21:06

## 2022-01-06 RX ADMIN — FAMOTIDINE 20 MG: 10 INJECTION, SOLUTION INTRAVENOUS at 20:53

## 2022-01-06 RX ADMIN — FAMOTIDINE 20 MG: 10 INJECTION, SOLUTION INTRAVENOUS at 14:23

## 2022-01-06 RX ADMIN — MAGNESIUM SULFATE HEPTAHYDRATE 2 G: 40 INJECTION, SOLUTION INTRAVENOUS at 12:11

## 2022-01-06 RX ADMIN — HEPARIN SODIUM 40000 UNITS: 1000 INJECTION, SOLUTION INTRAVENOUS; SUBCUTANEOUS at 09:03

## 2022-01-06 RX ADMIN — SUFENTANIL CITRATE 20 MCG: 50 INJECTION EPIDURAL; INTRAVENOUS at 08:22

## 2022-01-06 RX ADMIN — MUPIROCIN: 20 OINTMENT TOPICAL at 20:53

## 2022-01-06 RX ADMIN — SODIUM CHLORIDE, POTASSIUM CHLORIDE, SODIUM LACTATE AND CALCIUM CHLORIDE 25 ML/HR: 600; 310; 30; 20 INJECTION, SOLUTION INTRAVENOUS at 07:16

## 2022-01-06 RX ADMIN — MORPHINE SULFATE 4 MG: 4 INJECTION INTRAVENOUS at 15:30

## 2022-01-06 RX ADMIN — PROPOFOL 20 MG: 10 INJECTION, EMULSION INTRAVENOUS at 08:53

## 2022-01-06 RX ADMIN — GABAPENTIN 100 MG: 100 CAPSULE ORAL at 16:37

## 2022-01-06 RX ADMIN — SODIUM CHLORIDE 0.6 MCG/KG/HR: 9 INJECTION, SOLUTION INTRAVENOUS at 11:36

## 2022-01-06 RX ADMIN — SODIUM CHLORIDE 10 ML/HR: 9 INJECTION, SOLUTION INTRAVENOUS at 14:15

## 2022-01-06 RX ADMIN — SUFENTANIL CITRATE 0.3 MCG/KG/HR: 50 INJECTION EPIDURAL; INTRAVENOUS at 08:25

## 2022-01-06 RX ADMIN — PROTAMINE SULFATE 250 MG: 10 INJECTION, SOLUTION INTRAVENOUS at 11:29

## 2022-01-06 RX ADMIN — MIDAZOLAM 1 MG: 1 INJECTION INTRAMUSCULAR; INTRAVENOUS at 13:50

## 2022-01-06 RX ADMIN — Medication 2 UNITS/HR: at 10:31

## 2022-01-06 RX ADMIN — WATER 2 G: 1 INJECTION INTRAMUSCULAR; INTRAVENOUS; SUBCUTANEOUS at 08:30

## 2022-01-06 RX ADMIN — WATER 2 G: 1 INJECTION INTRAMUSCULAR; INTRAVENOUS; SUBCUTANEOUS at 11:30

## 2022-01-06 RX ADMIN — SODIUM CHLORIDE, PRESERVATIVE FREE 10 ML: 5 INJECTION INTRAVENOUS at 13:14

## 2022-01-06 RX ADMIN — PROPOFOL 200 MG: 10 INJECTION, EMULSION INTRAVENOUS at 08:19

## 2022-01-06 RX ADMIN — ALBUMIN (HUMAN) 12.5 G: 12.5 INJECTION, SOLUTION INTRAVENOUS at 20:30

## 2022-01-06 RX ADMIN — WATER 2 G: 1 INJECTION INTRAMUSCULAR; INTRAVENOUS; SUBCUTANEOUS at 23:48

## 2022-01-06 RX ADMIN — Medication 3 AMPULE: at 07:16

## 2022-01-06 RX ADMIN — Medication 1 MG: at 12:45

## 2022-01-06 RX ADMIN — DESMOPRESSIN ACETATE 28 MCG: 4 INJECTION INTRAVENOUS at 11:41

## 2022-01-06 RX ADMIN — CHLORHEXIDINE GLUCONATE 10 ML: 1.2 RINSE ORAL at 20:53

## 2022-01-06 RX ADMIN — ROCURONIUM BROMIDE 20 MG: 10 INJECTION INTRAVENOUS at 09:52

## 2022-01-06 RX ADMIN — ROCURONIUM BROMIDE 10 MG: 10 INJECTION INTRAVENOUS at 10:48

## 2022-01-06 RX ADMIN — MORPHINE SULFATE 4 MG: 4 INJECTION INTRAVENOUS at 12:57

## 2022-01-06 RX ADMIN — FENTANYL CITRATE 100 MCG: 50 INJECTION INTRAMUSCULAR; INTRAVENOUS at 07:20

## 2022-01-06 RX ADMIN — ROCURONIUM BROMIDE 100 MG: 10 INJECTION INTRAVENOUS at 08:19

## 2022-01-06 RX ADMIN — ALBUMIN (HUMAN) 12.5 G: 12.5 INJECTION, SOLUTION INTRAVENOUS at 15:56

## 2022-01-06 RX ADMIN — GABAPENTIN 100 MG: 100 CAPSULE ORAL at 21:06

## 2022-01-06 RX ADMIN — LIDOCAINE HYDROCHLORIDE 100 MG: 20 INJECTION, SOLUTION INTRAVENOUS at 08:19

## 2022-01-06 RX ADMIN — PHENYLEPHRINE HYDROCHLORIDE 80 MCG/MIN: 10 INJECTION INTRAVENOUS at 08:32

## 2022-01-06 RX ADMIN — SODIUM CHLORIDE 10 ML/HR: 4.5 INJECTION, SOLUTION INTRAVENOUS at 13:41

## 2022-01-06 RX ADMIN — HYDROMORPHONE HYDROCHLORIDE: 10 INJECTION INTRAMUSCULAR; INTRAVENOUS; SUBCUTANEOUS at 17:27

## 2022-01-06 RX ADMIN — PROPOFOL 30 MG: 10 INJECTION, EMULSION INTRAVENOUS at 08:58

## 2022-01-06 RX ADMIN — GLYCOPYRROLATE 0.2 MG: 0.2 INJECTION, SOLUTION INTRAMUSCULAR; INTRAVENOUS at 12:45

## 2022-01-06 RX ADMIN — ACETAMINOPHEN 1000 MG: 500 TABLET ORAL at 14:22

## 2022-01-06 RX ADMIN — AMINOCAPROIC ACID 10 G/HR: 250 INJECTION, SOLUTION INTRAVENOUS at 08:30

## 2022-01-06 RX ADMIN — MIDAZOLAM 2 MG: 1 INJECTION INTRAMUSCULAR; INTRAVENOUS at 07:20

## 2022-01-06 RX ADMIN — CHLORHEXIDINE GLUCONATE 10 ML: 1.2 RINSE ORAL at 14:23

## 2022-01-06 RX ADMIN — SODIUM CHLORIDE 5 MG/HR: 9 INJECTION, SOLUTION INTRAVENOUS at 13:27

## 2022-01-06 RX ADMIN — ACETAMINOPHEN 1000 MG: 500 TABLET ORAL at 20:53

## 2022-01-06 RX ADMIN — ONDANSETRON 4 MG: 2 INJECTION INTRAMUSCULAR; INTRAVENOUS at 18:10

## 2022-01-06 RX ADMIN — SUFENTANIL CITRATE 20 MCG: 50 INJECTION EPIDURAL; INTRAVENOUS at 08:48

## 2022-01-06 RX ADMIN — FENTANYL CITRATE 150 MCG: 50 INJECTION INTRAMUSCULAR; INTRAVENOUS at 08:19

## 2022-01-06 RX ADMIN — MIDAZOLAM 3 MG: 1 INJECTION INTRAMUSCULAR; INTRAVENOUS at 07:10

## 2022-01-06 RX ADMIN — WATER 2 G: 1 INJECTION INTRAMUSCULAR; INTRAVENOUS; SUBCUTANEOUS at 17:45

## 2022-01-06 RX ADMIN — OXYCODONE 10 MG: 5 TABLET ORAL at 21:06

## 2022-01-06 NOTE — PERIOP NOTES
covid test negative  Took vaccine. Wears mask no s/s covid virus nor has he been around anyone with the covid virus that he knows of.    hermilo completed. mepiex sacrum border preventatively applied. Dr. Annette Hodge informed of bpreading difference in arms   No orders given. 0708  Time out done   All in agreement of avr surgery today. Monitor in plae sr on scope and  0xygen     3l iters in place. Glucose reading at  98. No beta blocker  Versed    5cc total and fentanyl   2cc given by dr. Prakash Arnett line placed by crna student    Antwon Mohr r.n.   Working with david Crain

## 2022-01-06 NOTE — BRIEF OP NOTE
BRIEF OP NOTE  Pre-Op Diagnosis: AORTIC STENOSIS    Post-Op Diagnosis: AORTIC STENOSIS      Procedure:   AORTIC VALVE REPLACEMENT (AVR) with 25mm mechanical Romeo valve  Debridement of calcium off anterior leaflet of the mitral valve    Surgeon: Kevin iDaz MD    Assistant(s): Valerie Lawrence PA-C    Anesthesia: General     Infusions: precedex, insulin, love    Estimated Blood Loss:  1005ml    Cell Saver:  740ml    Specimens:   ID Type Source Tests Collected by Time Destination   1 : NATIVE AORTIC VALVE Preservative Aortic Valve  Jaclyn Marsh MD 1/6/2022 9259 Pathology       Drains and pacing wires: 2 atrial wires, 1 bipolar ventricular wire, 2 lucy drains    Complications: None    Findings: AS, Calcification of anterior leaflet of mitral valve    Implants:   Implant Name Type Inv.  Item Serial No.  Lot No. LRB No. Used Action   VALVE AORT H17.8MM OD25MM ID23.4MM FLARE DIA26.4MM SEW RNG - [id number]  VALVE AORT H17.8MM OD25MM ID23.4MM FLARE DIA26.4MM SEW RNG [id number] [employer] [facility]_ N/A N/A 1 Implanted

## 2022-01-06 NOTE — PERIOP NOTES
Patient interviewed in Main Operating Room/Cardiac Surgery, Pre-op Holding. Patient identifiers verified with name and date of birth. Procedure verified with patient. Consent forms verified. Allergies verified. Patient informed of procedure and plan of care. Questions answered with review. Patient voiced understanding of procedure and plan of care. Patient arrived to the operating room via stretcher. All wheels locked and patient transferred to the OR table with the assistance of four people. MTRE warming wrap present on OR table. Temp set at 37 C and monitored by perfusion. Unit # [id number]. After the induction of anesthesia and intubation, a 16 fr temp sensing carballo catheter was placed without difficulty. 10 ml of sterile water was used to inflate the balloon. Clear, yellow urine return noted. Urine output monitored by anesthesia. Mepilex sacral pad placed in pre-op, heel pads placed prior to anesthesia induction. Fluids:   0.9 % Sodium Chloride:   PRN irrigation    Sterile water:   1000 ml in splash basin for instrument care.

## 2022-01-06 NOTE — ANESTHESIA PROCEDURE NOTES
DIMITRI        Procedure Details: probe placement, image aquisition & interpretation    Risks and benefits discussed with the patient and plans are to proceed    Procedure Note    Performed by: Dante Snell MD  Authorized by:  Dante Snell MD       Indications: assessment of surgical repair  Modalities: 2D, CF, CWD, PWD  Probe Type: multiplane  Insertion: atraumatic  Patient Status: intubated and sedated    Echocardiographic and Doppler Measurements   Aorta  Size  Diam(cm)  Dissection PlaqueThick(mm)  Plaque Mobile    Ascending normal  No 0-3 No    Arch normal  No 0-3 No    Descending normal  No 0-3 No          Valves  Annulus  Stenosis  Area/Grad  Regurg  Leaflet   Morph  Leaflet   Motion    Aortic calcified moderate, severe 51/31, PAULA=1.2 0 calcified, bicuspid, thickened restricted    Mitral  none Shelf of calcium extending down the ventricular aspect of the anterior leaflet 1+ calcified normal    Tricuspid normal none  0 normal normal          Atria  Size  SEC (smoke)  Thrombus  Tumor  Device    Rt Atrium normal No No No No    Lt Atrium normal No No No No     Interatrial Septum Morphology: normal    Interventricular Septum Morphology: normal    Ventricle  Cavity Size  Cavity Dimension Hypertrophy  Thrombus  Gloal FXN  EF    RV normal  No no normal     LV normal  Yes No normal 75%       Regional Function  (1 = normal, 2 = mildly hypokinetic, 3 = severely hypokinetic, 4 = akinetic, 5 = dyskinetic) LAV - [place]   ME LAV = 0  ME LAV = 90  ME LAV = 130   Basal Sept:1 Basal Ant:1 Basal Post:1   Mid Sept:1 Mid Ant:1 Mid Post:1   Apical Sept:1 Apical Ant:1 Basal Ant Sept:1   Basal Lat:1 Basal Inf:1 Mid Ant Sept:1   Mid Lat:1 Mid Inf:1    Apical Lat:1 Apical Inf:1        Pericardium: normal    Post Intervention Follow-up Study  Ventricular Global Function: unchanged  Ventricular Regional Function: unchanged     Valve  Function  Regurgitation  Area    Aortic improved      Mitral no change      Tricuspid no change      Prosthetic normal       Complications: None  Comments: 25 mm bileaflet mechanical AVR: normal leaflet function, washing jets seen, no PVL, gradients 15/8

## 2022-01-06 NOTE — PROGRESS NOTES
1250  Patient received from OR s/p Aortic Valve Replacement. Received into CCU room 2540, accompanied by OR staff and CRNA. Connected to monitors. Bedside report from above mentioned staff members. BP goal < 120/systolic per OR staff report. Assessment completed. 1258  Patient now trying to sit up, tugging on restraints, kicking with legs/feet and trying to hit with arms/hands. Restraints in place. Medicated with versed 1 mg IV and morphine 4 mg IV.  1305  Now resting quietly with eyes closed. [address] started for BP control. Will titrate to maintain sys BP < 120 per verbal direction by Юлия Damon NP  1350  Again, agitated, restless, kicking, trying to hit. Versed 1 mg IV given for agitation. Placed on CPAP  1415  Restless at intervals, but calms after verbal reassurance. 1500  Hemodynamics steady. 1515  Extubated by RT to 4 lpm nasal cannula. 1530  Medicated with morphine 4 mg IV for pain. 65  Dr. Camila Alvarado at bedside; pacer placed on standby by Dr. Camila Alvarado. Cardiac rhythm noted to be SR with BBB. 1556  5% albumin 250 ml given for volume. 1600  Reassessment completed. PCA dilaudid ordered per Dr. Camila Alvarado. Awaiting pump to arrive via Trimedex. 1700  Hemodynamics steady. 1714  Weaning precedex. 1727  Dilaudid PCA started. See MAR for details. [address] stopped. [address] [facility] [address] thomas. 1800  Bath completed by PCT and RN. 1810  Zofran 4 mg IV given for nausea. 1900  Bedside and Verbal shift change report given to Raisa Moreno RN (oncoming nurse) by Phuong Wilburn RN (offgoing nurse). Report included the following information SBAR, Kardex, OR Summary, Procedure Summary, Intake/Output, MAR, Accordion, Recent Results and Cardiac Rhythm SR with BBB.

## 2022-01-06 NOTE — H&P
Date of Surgery Update:  Tika Martin was seen and examined. History and physical has been reviewed. The patient has been examined.  There have been no significant clinical changes since the completion of the originally dated History and Physical.    Signed By: BRYSON Ly     January 6, 2022 8:20 AM

## 2022-01-06 NOTE — PROGRESS NOTES
PT note:     Orders received and acknowledged. Chart reviewed and noted patient underwent AVR this date. Per guidelines, will defer and follow up tomorrow for evaluation.      Iris Mcclelland, PT, DPT

## 2022-01-06 NOTE — PROGRESS NOTES
Cardiac Surgery Care Coordinator- Met with the family of Hussein Ricardo. Introduced role of the Cardiac Surgery Coordinator. Reviewed plan of care and day of surgery expectations. Provided family with an update from OR. Encouraged family to verbalize and emotional support given. Will continue to update throughout the day. Laurent Cook RN    0464 - Met with family of Hussein Ricardo, provided family with update. Family without questions or concerns at this time. Will continue to follow for educational and emotional needs. Laurent Cook RN     [phone] - Met with family of Hussein Ricardo, provided family with update of off bypass. Family without questions or concerns at this time. Will continue to follow for educational and emotional needs. Laurent Cook RN    9231 - Met with Mary Ellen Diaz family and Dr. Angela Thomas. Update given. Encouraged family to verbalize and offered emotional support. Reinforced Surgical waiting room instructions. Family to wait in the main surgical waiting room until contacted by the nursing staff.  Laurent Cook RN

## 2022-01-06 NOTE — ANESTHESIA PROCEDURE NOTES
Arterial Line Placement    Start time: 1/6/2022 7:15 AM  End time: 1/6/2022 7:18 AM  Performed by: Az Hernández MD  Authorized by:  Az Hernández MD     Pre-Procedure  Indications:  Arterial pressure monitoring  Preanesthetic Checklist: patient identified, risks and benefits discussed, anesthesia consent, site marked, patient being monitored, timeout performed and patient being monitored      Procedure:   Prep:  Chlorhexidine  Seldinger Technique?: Yes    Orientation:  Right  Location:  Radial artery  Catheter size:  20 G  Number of attempts:  1  Cont Cardiac Output Sensor: Yes      Assessment:   Post-procedure:  Line secured and sterile dressing applied  Patient Tolerance:  Patient tolerated the procedure well with no immediate complications

## 2022-01-06 NOTE — CONSULTS
Pulmonology Intensive Care Unit Initial Assessment    Subjective:        Subjective:     Critical Care Initial Evaluation Note: 1/6/2022 4:46 PM    This is a 51-year-old  male who was recently diagnosed with severe aortic stenosis and also had Covid infection in September/21. Today he underwent elective mechanical aortic valve replacement. Postoperatively he is admitted to ICU for further care and monitoring. Critical care consult is requested for comanagement. Past Medical History:   Diagnosis Date    Aortic stenosis     Aortic valve stenosis       History reviewed. No pertinent surgical history. Prior to Admission medications    Medication Sig Start Date End Date Taking? Authorizing Provider   zinc 50 mg tab tablet Take 50 mg by mouth daily. Provider, Historical   ascorbic acid, vitamin C, (Vitamin C) 500 mg tablet Take 500 mg by mouth daily. Provider, Historical     No Known Allergies   Social History     Tobacco Use    Smoking status: Former Smoker     Packs/day: 1.00     Years: 6.50     Pack years: 6.50     Quit date: 2007     Years since quitting: 15.0    Smokeless tobacco: Never Used   Substance Use Topics    Alcohol use: Yes     Alcohol/week: 12.0 standard drinks     Types: 12 Cans of beer per week      Family History   Problem Relation Age of Onset    Aortic stenosis Paternal Uncle     Stroke Paternal Grandfather     Aortic stenosis Paternal Grandmother         Review of Systems   Unable to perform ROS: Acuity of condition       Objective:     Vital signs reviewed. 01/06 0701 - 01/06 1900  In: 1233.8 [I.V.:493.8]  Out: 1430 [Urine:1090; Drains:340]  No intake/output data recorded. Physical Exam  Constitutional:       Comments: Intubated. HENT:      Head: Normocephalic and atraumatic. Mouth/Throat:      Mouth: Mucous membranes are moist.   Eyes:      Extraocular Movements: Extraocular movements intact.       Conjunctiva/sclera: Conjunctivae normal.   Cardiovascular: Rate and Rhythm: Normal rate and regular rhythm. Pulmonary:      Breath sounds: No wheezing or rales. Abdominal:      General: There is no distension. Palpations: Abdomen is soft. Tenderness: There is no abdominal tenderness. Musculoskeletal:      Cervical back: Neck supple.          Data Review:     Recent Results (from the past 24 hour(s))   GLUCOSE, POC    Collection Time: 01/06/22  6:25 AM   Result Value Ref Range    Glucose (POC) 98 65 - 117 mg/dL    Performed by Jolene Buckle    Collection Time: 01/06/22 10:31 AM   Result Value Ref Range    Glucose 127 mg/dL    Insulin order 2.0 units/hour    Insulin adminstered 2.0 units/hour    Multiplier 0.030     Low target 100 mg/dL    High target 140 mg/dL    D50 order 0.0 ml    D50 administered 0.00 ml    Minutes until next BG 60 min    Order initials vy     Administered initials vy     GLSCOM Comments     GLUCOSTABILIZER    Collection Time: 01/06/22 10:46 AM   Result Value Ref Range    Glucose 137 mg/dL    Insulin order 2.3 units/hour    Insulin adminstered 2.3 units/hour    Multiplier 0.030     Low target 100 mg/dL    High target 140 mg/dL    D50 order 0.0 ml    D50 administered 0.00 ml    Minutes until next BG 60 min    Order initials vy     Administered initials vy     GLSCOM Comments     GLUCOSTABILIZER    Collection Time: 01/06/22 11:16 AM   Result Value Ref Range    Glucose 129 mg/dL    Insulin order 2.1 units/hour    Insulin adminstered 2.1 units/hour    Multiplier 0.030     Low target 100 mg/dL    High target 140 mg/dL    D50 order 0.0 ml    D50 administered 0.00 ml    Minutes until next BG 60 min    Order initials vy     Administered initials vy     GLSCOM Comments     GLUCOSTABILIZER    Collection Time: 01/06/22 11:48 AM   Result Value Ref Range    Glucose 112 mg/dL    Insulin order 1.6 units/hour    Insulin adminstered 1.6 units/hour    Multiplier 0.030     Low target 100 mg/dL    High target 140 mg/dL    D50 order 0.0 ml    D50 administered 0.00 ml    Minutes until next BG 60 min    Order initials jm     Administered initials jm     GLSCOM Comments     GLUCOSTABILIZER    Collection Time: 01/06/22 12:27 PM   Result Value Ref Range    Glucose 104 mg/dL    Insulin order 1.3 units/hour    Insulin adminstered 1.3 units/hour    Multiplier 0.030     Low target 100 mg/dL    High target 140 mg/dL    D50 order 0.0 ml    D50 administered 0.00 ml    Minutes until next BG 60 min    Order initials vy     Administered initials vy     GLSCOM Comments     METABOLIC PANEL, COMPREHENSIVE    Collection Time: 01/06/22  1:06 PM   Result Value Ref Range    Sodium 140 136 - 145 mmol/L    Potassium 4.0 3.5 - 5.1 mmol/L    Chloride 106 97 - 108 mmol/L    CO2 27 21 - 32 mmol/L    Anion gap 7 5 - 15 mmol/L    Glucose 113 (H) 65 - 100 mg/dL    BUN 14 6 - 20 MG/DL    Creatinine 1.32 (H) 0.70 - 1.30 MG/DL    BUN/Creatinine ratio 11 (L) 12 - 20      GFR est AA >60 >60 ml/min/1.73m2    GFR est non-AA 58 (L) >60 ml/min/1.73m2    Calcium 8.9 8.5 - 10.1 MG/DL    Bilirubin, total 0.4 0.2 - 1.0 MG/DL    ALT (SGPT) 19 12 - 78 U/L    AST (SGOT) 37 15 - 37 U/L    Alk.  phosphatase 38 (L) 45 - 117 U/L    Protein, total 5.8 (L) 6.4 - 8.2 g/dL    Albumin 3.2 (L) 3.5 - 5.0 g/dL    Globulin 2.6 2.0 - 4.0 g/dL    A-G Ratio 1.2 1.1 - 2.2     MAGNESIUM    Collection Time: 01/06/22  1:06 PM   Result Value Ref Range    Magnesium 3.4 (H) 1.6 - 2.4 mg/dL   CBC WITH AUTOMATED DIFF    Collection Time: 01/06/22  1:06 PM   Result Value Ref Range    WBC 11.9 (H) 4.1 - 11.1 K/uL    RBC 3.73 (L) 4.10 - 5.70 M/uL    HGB 12.2 12.1 - 17.0 g/dL    HCT 34.9 (L) 36.6 - 50.3 %    MCV 93.6 80.0 - 99.0 FL    MCH 32.7 26.0 - 34.0 PG    MCHC 35.0 30.0 - 36.5 g/dL    RDW 12.3 11.5 - 14.5 %    PLATELET 809 (L) 616 - 400 K/uL    MPV 10.2 8.9 - 12.9 FL    NRBC 0.0 0  WBC    ABSOLUTE NRBC 0.00 0.00 - 0.01 K/uL    NEUTROPHILS 86 (H) 32 - 75 %    LYMPHOCYTES 8 (L) 12 - 49 %    MONOCYTES 5 5 - 13 % EOSINOPHILS 0 0 - 7 %    BASOPHILS 0 0 - 1 %    IMMATURE GRANULOCYTES 1 (H) 0.0 - 0.5 %    ABS. NEUTROPHILS 10.2 (H) 1.8 - 8.0 K/UL    ABS. LYMPHOCYTES 1.0 0.8 - 3.5 K/UL    ABS. MONOCYTES 0.6 0.0 - 1.0 K/UL    ABS. EOSINOPHILS 0.0 0.0 - 0.4 K/UL    ABS. BASOPHILS 0.0 0.0 - 0.1 K/UL    ABS. IMM.  GRANS. 0.1 (H) 0.00 - 0.04 K/UL    DF AUTOMATED     PTT    Collection Time: 01/06/22  1:06 PM   Result Value Ref Range    aPTT 23.6 22.1 - 31.0 sec    aPTT, therapeutic range     58.0 - 77.0 SECS   PROTHROMBIN TIME + INR    Collection Time: 01/06/22  1:06 PM   Result Value Ref Range    INR 1.1 0.9 - 1.1      Prothrombin time 11.6 (H) 9.0 - 11.1 sec   GLUCOSE, POC    Collection Time: 01/06/22  1:10 PM   Result Value Ref Range    Glucose (POC) 107 65 - 117 mg/dL    Performed by Marie Mcguire    Collection Time: 01/06/22  1:11 PM   Result Value Ref Range    Glucose 107 mg/dL    Insulin order 1.4 units/hour    Insulin adminstered 1.4 units/hour    Multiplier 0.030     Low target 95 mg/dL    High target 130 mg/dL    D50 order 0.0 ml    D50 administered 0.00 ml    Minutes until next BG 60 min    Order initials mdf     Administered initials mdf     GLSCOM Comments     BLOOD GAS + IONIZED CALCIUM    Collection Time: 01/06/22  1:12 PM   Result Value Ref Range    pH 7.33 (L) 7.35 - 7.45      PCO2 49 (H) 35 - 45 mmHg    PO2 257 (H) 80 - 100 mmHg    BICARBONATE 25 22 - 26 mmol/L    BASE DEFICIT 1.2 mmol/L    O2  (H) 92 - 97 %    Calcium, ionized 1.28 1.13 - 1.32 mmol/L    O2 METHOD VENT      FIO2 80 %    MODE SIMV      Tidal volume 520.0      SET RATE 16      PEEP/CPAP 5.0      Sample source ARTERIAL      SITE DRAWN FROM ARTERIAL LINE      LIZABETH'S TEST NOT APPLICABLE     GLUCOSE, POC    Collection Time: 01/06/22  2:13 PM   Result Value Ref Range    Glucose (POC) 113 65 - 117 mg/dL    Performed by Marie Mcguire    Collection Time: 01/06/22  2:13 PM   Result Value Ref Range    Glucose 113 mg/dL    Insulin order 1.6 units/hour    Insulin adminstered 1.6 units/hour    Multiplier 0.030     Low target 95 mg/dL    High target 130 mg/dL    D50 order 0.0 ml    D50 administered 0.00 ml    Minutes until next BG 60 min    Order initials mdf     Administered initials mdf     GLSCOM Comments     BLOOD GAS, ARTERIAL    Collection Time: 01/06/22  2:58 PM   Result Value Ref Range    pH 7.39 7.35 - 7.45      PCO2 46 (H) 35 - 45 mmHg    PO2 148 (H) 80 - 100 mmHg    O2 SAT 99 (H) 92 - 97 %    BICARBONATE 27 (H) 22 - 26 mmol/L    BASE EXCESS 1.3 mmol/L    O2 METHOD VENT      FIO2 50 %    MODE CPAP      PRESSURE SUPPORT 10      PEEP/CPAP 5.0      Sample source ARTERIAL      SITE DRAWN FROM ARTERIAL LINE      LIZABETH'S TEST NOT APPLICABLE     GLUCOSE, POC    Collection Time: 01/06/22  3:16 PM   Result Value Ref Range    Glucose (POC) 118 (H) 65 - 117 mg/dL    Performed by Sean Necessary    Collection Time: 01/06/22  3:16 PM   Result Value Ref Range    Glucose 118 mg/dL    Insulin order 1.7 units/hour    Insulin adminstered 1.7 units/hour    Multiplier 0.030     Low target 95 mg/dL    High target 130 mg/dL    D50 order 0.0 ml    D50 administered 0.00 ml    Minutes until next BG 60 min    Order initials mdf     Administered initials mdf     GLSCOM Comments     GLUCOSE, POC    Collection Time: 01/06/22  4:28 PM   Result Value Ref Range    Glucose (POC) 141 (H) 65 - 117 mg/dL    Performed by Sean Necessary    Collection Time: 01/06/22  4:29 PM   Result Value Ref Range    Glucose 141 mg/dL    Insulin order 2.4 units/hour    Insulin adminstered 2.4 units/hour    Multiplier 0.030     Low target 95 mg/dL    High target 130 mg/dL    D50 order 0.0 ml    D50 administered 0.00 ml    Minutes until next BG 60 min    Order initials mdf     Administered initials mdf     GLSCOM Comments           Assessment:     Post cardiac surgery. Status post mechanical AVR. Shock from vasoplegia.   Ventilator management. Acute post hemorrhagic anemia from perioperative blood loss. CAD. Plan:     Continue lung protective ventilation. Titrate FiO2 for goal saturation of 88 to 92%. Wean sedation and plan for SBT if meets criteria. Wean sedation for goal RASS of 0 to -1 and plan for SBT if meets criteria. Titrate inotropes for cardiac index of more than 2.0.  Titrate vasopressors for goal map of more than 65. Closely monitor chest tube output. Monitor hemoglobin. post AVR AC per CTS. Closely monitor hemodynamics and markers of endorgan perfusion including mental status, urine output and lactate. Diuresis as tolerated. Titrate sedation with goal RASS of 0 to -1. Delirium precautions. Code status: Full code. DVT prophylaxis: AC for AVR per CTS. 40 minutes of critical care time spent without overlap and excluding procedures.

## 2022-01-06 NOTE — ANESTHESIA PROCEDURE NOTES
Central Line and Pulmonary Artery Catheter Placement    Start time: 1/6/2022 7:18 AM  End time: 1/6/2022 7:34 AM  Performed by: Jose Luis Rowell MD  Authorized by: Jose Luis Rowell MD     Indications: vascular access, central pressure monitoring and need for vasopressors  Preanesthetic Checklist: patient identified, risks and benefits discussed, anesthesia consent, site marked, patient being monitored and timeout performed      Pre-procedure: All elements of maximal sterile barrier technique followed?  Yes    2% Chlorhexidine for cutaneous antisepsis, Hand hygiene performed prior to catheter insertion and Ultrasound guidance    Sterile Ultrasound Technique followed?: Yes            Procedure:   Prep:  Chlorhexidine  Location: internal jugular  Orientation:  Right  Patient position:  Flat  Catheter type:  Double lumen  Catheter size:  9 Fr  Catheter length:  12 cm  Number of attempts:  1  Successful placement: Yes      Assessment:   Post-procedure:  Catheter secured and sterile dressing with CHG applied  Assessment:  Blood return through all ports  Insertion:  Uncomplicated  Patient tolerance:  Patient tolerated the procedure well with no immediate complications  9 Fr MAC and 8 Fr CCO PA Catheter

## 2022-01-06 NOTE — PROGRESS NOTES
Transition of Care Plan:    RUR: 5%    Disposition: Home with spouse    Follow up appointments: To be done prior to discharge. DME needed: To be determined. Transportation at [facility] to transport. [place] or means to access home:  Wife has keys.  Medicare Letter: N/A---Patient has Superprotonic    Is patient a BCPI-A Bundle:  No           If yes, was Bundle Letter given?:  N/A    Is patient a [place] and connected with the [place]? No    If yes, was [place] transfer form completed and [facility] notified? N/A     Caregiver Contact:Wife---Samantha ALANIS---[phone]    Discharge Caregiver contacted prior to discharge? Caregiver to be contacted prior to discharge. Reason for Admission:  Patient dx with aortic stenosis and had aortic valve placement with debridement calcium off anterior leaflet of mitral valve today. RUR Score:   5%                 Plan for utilizing home health:   He has not used home health services or rehab in the past. They are open to services if needed. PCP: First and Last name:  Clinical specialist to obtain patient a new pcp. Name of Practice: To obtain a new pcp. Are you a current patient: Yes/No: To obtain a new pcp. Approximate date of last visit: [facility] are agreeable to having a pcp     Can you participate in a virtual visit with your PCP:  Louie Coates one is obtained. Current Advanced Directive/Advance Care Plan: Full Code  Advance Care Planning     General Advance Care Planning (ACP) Conversation      Date of Conversation: 1/6/22  Conducted with: Patient with Decision Making Capacity    Healthcare Decision Maker:   No healthcare decision makers have been documented.    Click here to complete [address] including selection of the Healthcare Decision Maker Relationship (ie \"Primary\")        Content/Action Overview:   DECLINED ACP conversation - will revisit periodically Reviewed DNR/DNI and patient elects Full Code (Attempt Resuscitation)         Length of Voluntary ACP Conversation in minutes:  <16 minutes (Non-Billable)    Claudetta Manifold, RN                 Healthcare Decision Maker:   Click here to complete [address] including selection of the Healthcare Decision Maker Relationship (ie \"Primary\")               Patient had surgery today and is recovering in the ccu. Spoke with his wife and she verified demographics . [facility] and [facility] agreeable for him to have a pcp. Clinical specialist will attempt to find a pcp for patient prior to discharge. Patient lives in one story home with his wife. He was independent prior to coming to the hospital. He works and drives. He does not use home oxygen or cpap. He does not use dme for ambulation. Will continue to monitor. Maryjo Long RN BSN CRM        [phone]

## 2022-01-06 NOTE — ANESTHESIA PREPROCEDURE EVALUATION
Relevant Problems   CARDIOVASCULAR   (+) Aortic stenosis       Anesthetic History   No history of anesthetic complications            Review of Systems / Medical History  Patient summary reviewed, nursing notes reviewed and pertinent labs reviewed    Pulmonary  Within defined limits                 Neuro/Psych   Within defined limits           Cardiovascular      Valvular problems/murmurs: aortic stenosis            Exercise tolerance: >4 METS     GI/Hepatic/Renal  Within defined limits              Endo/Other  Within defined limits           Other Findings              Physical Exam    Airway  Mallampati: II  TM Distance: 4 - 6 cm  Neck ROM: normal range of motion   Mouth opening: Normal     Cardiovascular    Rhythm: regular  Rate: normal    Murmur, Aortic area     Dental  No notable dental hx       Pulmonary  Breath sounds clear to auscultation               Abdominal  GI exam deferred       Other Findings            Anesthetic Plan    ASA: 3  Anesthesia type: general    Monitoring Plan: Arterial line, BIS, CVP, [place] and DIMITRI    Post procedure ventilation   Induction: Intravenous  Anesthetic plan and risks discussed with: Patient

## 2022-01-07 ENCOUNTER — TRANSCRIBE ORDER (OUTPATIENT)
Dept: CARDIAC REHAB | Age: 47
End: 2022-01-07

## 2022-01-07 ENCOUNTER — APPOINTMENT (OUTPATIENT)
Dept: GENERAL RADIOLOGY | Age: 47
DRG: 219 | End: 2022-01-07
Attending: PHYSICIAN ASSISTANT
Payer: COMMERCIAL

## 2022-01-07 DIAGNOSIS — Z95.4 H/O ALLOGRAFT AORTIC VALVE REPLACEMENT: Primary | ICD-10-CM

## 2022-01-07 LAB
ADMINISTERED INITIALS, ADMINIT: NORMAL
ALBUMIN SERPL-MCNC: 3.8 G/DL (ref 3.5–5)
ALBUMIN/GLOB SERPL: 1.7 {RATIO} (ref 1.1–2.2)
ALP SERPL-CCNC: 34 U/L (ref 45–117)
ALT SERPL-CCNC: 20 U/L (ref 12–78)
ANION GAP SERPL CALC-SCNC: 6 MMOL/L (ref 5–15)
APTT PPP: 26.5 SEC (ref 22.1–31)
APTT PPP: 34.2 SEC (ref 22.1–31)
AST SERPL-CCNC: 49 U/L (ref 15–37)
ATRIAL RATE: 71 BPM
ATRIAL RATE: 75 BPM
BASOPHILS # BLD: 0 K/UL (ref 0–0.1)
BASOPHILS # BLD: 0 K/UL (ref 0–0.1)
BASOPHILS NFR BLD: 0 % (ref 0–1)
BASOPHILS NFR BLD: 0 % (ref 0–1)
BDY SITE: ABNORMAL
BILIRUB SERPL-MCNC: 0.6 MG/DL (ref 0.2–1)
BUN SERPL-MCNC: 16 MG/DL (ref 6–20)
BUN/CREAT SERPL: 15 (ref 12–20)
CALCIUM SERPL-MCNC: 8.2 MG/DL (ref 8.5–10.1)
CALCULATED P AXIS, ECG09: 45 DEGREES
CALCULATED P AXIS, ECG09: 64 DEGREES
CALCULATED R AXIS, ECG10: -22 DEGREES
CALCULATED R AXIS, ECG10: -29 DEGREES
CALCULATED T AXIS, ECG11: 119 DEGREES
CALCULATED T AXIS, ECG11: 128 DEGREES
CHLORIDE SERPL-SCNC: 107 MMOL/L (ref 97–108)
CO2 SERPL-SCNC: 25 MMOL/L (ref 21–32)
COHGB MFR BLD: 0.9 % (ref 1–2)
CREAT SERPL-MCNC: 1.09 MG/DL (ref 0.7–1.3)
D50 ADMINISTERED, D50ADM: 0 ML
D50 ORDER, D50ORD: 0 ML
DIAGNOSIS, 93000: NORMAL
DIAGNOSIS, 93000: NORMAL
DIFFERENTIAL METHOD BLD: ABNORMAL
DIFFERENTIAL METHOD BLD: ABNORMAL
EOSINOPHIL # BLD: 0 K/UL (ref 0–0.4)
EOSINOPHIL # BLD: 0 K/UL (ref 0–0.4)
EOSINOPHIL NFR BLD: 0 % (ref 0–7)
EOSINOPHIL NFR BLD: 0 % (ref 0–7)
ERYTHROCYTE [DISTWIDTH] IN BLOOD BY AUTOMATED COUNT: 12.4 % (ref 11.5–14.5)
ERYTHROCYTE [DISTWIDTH] IN BLOOD BY AUTOMATED COUNT: 12.4 % (ref 11.5–14.5)
ERYTHROCYTE [DISTWIDTH] IN BLOOD BY AUTOMATED COUNT: 12.6 % (ref 11.5–14.5)
GLOBULIN SER CALC-MCNC: 2.2 G/DL (ref 2–4)
GLSCOM COMMENTS: NORMAL
GLUCOSE BLD STRIP.AUTO-MCNC: 100 MG/DL (ref 65–117)
GLUCOSE BLD STRIP.AUTO-MCNC: 104 MG/DL (ref 65–117)
GLUCOSE BLD STRIP.AUTO-MCNC: 105 MG/DL (ref 65–117)
GLUCOSE BLD STRIP.AUTO-MCNC: 106 MG/DL (ref 65–117)
GLUCOSE BLD STRIP.AUTO-MCNC: 109 MG/DL (ref 65–117)
GLUCOSE BLD STRIP.AUTO-MCNC: 109 MG/DL (ref 65–117)
GLUCOSE BLD STRIP.AUTO-MCNC: 110 MG/DL (ref 65–117)
GLUCOSE BLD STRIP.AUTO-MCNC: 111 MG/DL (ref 65–117)
GLUCOSE BLD STRIP.AUTO-MCNC: 116 MG/DL (ref 65–117)
GLUCOSE BLD STRIP.AUTO-MCNC: 119 MG/DL (ref 65–117)
GLUCOSE BLD STRIP.AUTO-MCNC: 121 MG/DL (ref 65–117)
GLUCOSE BLD STRIP.AUTO-MCNC: 123 MG/DL (ref 65–117)
GLUCOSE BLD STRIP.AUTO-MCNC: 132 MG/DL (ref 65–117)
GLUCOSE BLD STRIP.AUTO-MCNC: 133 MG/DL (ref 65–117)
GLUCOSE BLD STRIP.AUTO-MCNC: 141 MG/DL (ref 65–117)
GLUCOSE BLD STRIP.AUTO-MCNC: 92 MG/DL (ref 65–117)
GLUCOSE BLD STRIP.AUTO-MCNC: 94 MG/DL (ref 65–117)
GLUCOSE BLD STRIP.AUTO-MCNC: 97 MG/DL (ref 65–117)
GLUCOSE BLD STRIP.AUTO-MCNC: 97 MG/DL (ref 65–117)
GLUCOSE SERPL-MCNC: 105 MG/DL (ref 65–100)
GLUCOSE, GLC: 100 MG/DL
GLUCOSE, GLC: 101 MG/DL
GLUCOSE, GLC: 105 MG/DL
GLUCOSE, GLC: 106 MG/DL
GLUCOSE, GLC: 109 MG/DL
GLUCOSE, GLC: 109 MG/DL
GLUCOSE, GLC: 110 MG/DL
GLUCOSE, GLC: 111 MG/DL
GLUCOSE, GLC: 116 MG/DL
GLUCOSE, GLC: 119 MG/DL
GLUCOSE, GLC: 121 MG/DL
GLUCOSE, GLC: 123 MG/DL
GLUCOSE, GLC: 132 MG/DL
GLUCOSE, GLC: 133 MG/DL
GLUCOSE, GLC: 141 MG/DL
GLUCOSE, GLC: 92 MG/DL
GLUCOSE, GLC: 94 MG/DL
GLUCOSE, GLC: 97 MG/DL
GLUCOSE, GLC: 97 MG/DL
HCT VFR BLD AUTO: 31.9 % (ref 36.6–50.3)
HCT VFR BLD AUTO: 34.9 % (ref 36.6–50.3)
HCT VFR BLD AUTO: 36 % (ref 36.6–50.3)
HGB BLD OXIMETRY-MCNC: 11.8 G/DL (ref 14–17)
HGB BLD-MCNC: 10.9 G/DL (ref 12.1–17)
HGB BLD-MCNC: 11.9 G/DL (ref 12.1–17)
HGB BLD-MCNC: 12 G/DL (ref 12.1–17)
HIGH TARGET, HITG: 130 MG/DL
IMM GRANULOCYTES # BLD AUTO: 0.1 K/UL (ref 0–0.04)
IMM GRANULOCYTES # BLD AUTO: 0.1 K/UL (ref 0–0.04)
IMM GRANULOCYTES NFR BLD AUTO: 1 % (ref 0–0.5)
IMM GRANULOCYTES NFR BLD AUTO: 1 % (ref 0–0.5)
INSULIN ADMINSTERED, INSADM: 0 UNITS/HOUR
INSULIN ADMINSTERED, INSADM: 0.3 UNITS/HOUR
INSULIN ADMINSTERED, INSADM: 0.4 UNITS/HOUR
INSULIN ADMINSTERED, INSADM: 0.4 UNITS/HOUR
INSULIN ADMINSTERED, INSADM: 0.5 UNITS/HOUR
INSULIN ADMINSTERED, INSADM: 0.6 UNITS/HOUR
INSULIN ADMINSTERED, INSADM: 0.6 UNITS/HOUR
INSULIN ADMINSTERED, INSADM: 0.7 UNITS/HOUR
INSULIN ADMINSTERED, INSADM: 1 UNITS/HOUR
INSULIN ADMINSTERED, INSADM: 1.6 UNITS/HOUR
INSULIN ORDER, INSORD: 0 UNITS/HOUR
INSULIN ORDER, INSORD: 0.3 UNITS/HOUR
INSULIN ORDER, INSORD: 0.4 UNITS/HOUR
INSULIN ORDER, INSORD: 0.4 UNITS/HOUR
INSULIN ORDER, INSORD: 0.5 UNITS/HOUR
INSULIN ORDER, INSORD: 0.6 UNITS/HOUR
INSULIN ORDER, INSORD: 0.6 UNITS/HOUR
INSULIN ORDER, INSORD: 0.7 UNITS/HOUR
INSULIN ORDER, INSORD: 1 UNITS/HOUR
INSULIN ORDER, INSORD: 1.6 UNITS/HOUR
LOW TARGET, LOT: 95 MG/DL
LYMPHOCYTES # BLD: 1 K/UL (ref 0.8–3.5)
LYMPHOCYTES # BLD: 1.2 K/UL (ref 0.8–3.5)
LYMPHOCYTES NFR BLD: 7 % (ref 12–49)
LYMPHOCYTES NFR BLD: 8 % (ref 12–49)
MAGNESIUM SERPL-MCNC: 2.5 MG/DL (ref 1.6–2.4)
MCH RBC QN AUTO: 32.5 PG (ref 26–34)
MCH RBC QN AUTO: 32.9 PG (ref 26–34)
MCH RBC QN AUTO: 33.1 PG (ref 26–34)
MCHC RBC AUTO-ENTMCNC: 33.1 G/DL (ref 30–36.5)
MCHC RBC AUTO-ENTMCNC: 34.2 G/DL (ref 30–36.5)
MCHC RBC AUTO-ENTMCNC: 34.4 G/DL (ref 30–36.5)
MCV RBC AUTO: 95.2 FL (ref 80–99)
MCV RBC AUTO: 96.4 FL (ref 80–99)
MCV RBC AUTO: 99.4 FL (ref 80–99)
METHGB MFR BLD: 0.1 % (ref 0–1.4)
MINUTES UNTIL NEXT BG, NBG: 60 MIN
MONOCYTES # BLD: 1.5 K/UL (ref 0–1)
MONOCYTES # BLD: 1.6 K/UL (ref 0–1)
MONOCYTES NFR BLD: 10 % (ref 5–13)
MONOCYTES NFR BLD: 10 % (ref 5–13)
MULTIPLIER, MUL: 0
MULTIPLIER, MUL: 0.01
MULTIPLIER, MUL: 0.02
MULTIPLIER, MUL: 0.02
NEUTS SEG # BLD: 12.4 K/UL (ref 1.8–8)
NEUTS SEG # BLD: 13.2 K/UL (ref 1.8–8)
NEUTS SEG NFR BLD: 81 % (ref 32–75)
NEUTS SEG NFR BLD: 83 % (ref 32–75)
NRBC # BLD: 0 K/UL (ref 0–0.01)
NRBC BLD-RTO: 0 PER 100 WBC
ORDER INITIALS, ORDINIT: NORMAL
OXYHGB MFR BLD: 57.7 % (ref 94–97)
P-R INTERVAL, ECG05: 164 MS
P-R INTERVAL, ECG05: 172 MS
PLATELET # BLD AUTO: 128 K/UL (ref 150–400)
PLATELET # BLD AUTO: 140 K/UL (ref 150–400)
PLATELET # BLD AUTO: 153 K/UL (ref 150–400)
PMV BLD AUTO: 10.5 FL (ref 8.9–12.9)
PMV BLD AUTO: 10.5 FL (ref 8.9–12.9)
PMV BLD AUTO: 10.7 FL (ref 8.9–12.9)
POTASSIUM SERPL-SCNC: 4.6 MMOL/L (ref 3.5–5.1)
PROT SERPL-MCNC: 6 G/DL (ref 6.4–8.2)
Q-T INTERVAL, ECG07: 424 MS
Q-T INTERVAL, ECG07: 448 MS
QRS DURATION, ECG06: 136 MS
QRS DURATION, ECG06: 144 MS
QTC CALCULATION (BEZET), ECG08: 460 MS
QTC CALCULATION (BEZET), ECG08: 500 MS
RBC # BLD AUTO: 3.35 M/UL (ref 4.1–5.7)
RBC # BLD AUTO: 3.62 M/UL (ref 4.1–5.7)
RBC # BLD AUTO: 3.62 M/UL (ref 4.1–5.7)
SAO2 % BLD: 58 % (ref 95–99)
SERVICE CMNT-IMP: ABNORMAL
SERVICE CMNT-IMP: NORMAL
SODIUM SERPL-SCNC: 138 MMOL/L (ref 136–145)
SPECIMEN SITE: ABNORMAL
THERAPEUTIC RANGE,PTTT: ABNORMAL SECS (ref 58–77)
THERAPEUTIC RANGE,PTTT: NORMAL SECS (ref 58–77)
VENTRICULAR RATE, ECG03: 71 BPM
VENTRICULAR RATE, ECG03: 75 BPM
WBC # BLD AUTO: 12.5 K/UL (ref 4.1–11.1)
WBC # BLD AUTO: 15.2 K/UL (ref 4.1–11.1)
WBC # BLD AUTO: 16 K/UL (ref 4.1–11.1)

## 2022-01-07 PROCEDURE — 74011000258 HC RX REV CODE- 258: Performed by: PHYSICIAN ASSISTANT

## 2022-01-07 PROCEDURE — 74011000250 HC RX REV CODE- 250: Performed by: NURSE PRACTITIONER

## 2022-01-07 PROCEDURE — 74011250636 HC RX REV CODE- 250/636: Performed by: NURSE PRACTITIONER

## 2022-01-07 PROCEDURE — 83735 ASSAY OF MAGNESIUM: CPT

## 2022-01-07 PROCEDURE — 85730 THROMBOPLASTIN TIME PARTIAL: CPT

## 2022-01-07 PROCEDURE — 99233 SBSQ HOSP IP/OBS HIGH 50: CPT | Performed by: CLINICAL NURSE SPECIALIST

## 2022-01-07 PROCEDURE — 65660000000 HC RM CCU STEPDOWN

## 2022-01-07 PROCEDURE — 71045 X-RAY EXAM CHEST 1 VIEW: CPT

## 2022-01-07 PROCEDURE — 74011250636 HC RX REV CODE- 250/636: Performed by: THORACIC SURGERY (CARDIOTHORACIC VASCULAR SURGERY)

## 2022-01-07 PROCEDURE — 97162 PT EVAL MOD COMPLEX 30 MIN: CPT

## 2022-01-07 PROCEDURE — 97165 OT EVAL LOW COMPLEX 30 MIN: CPT

## 2022-01-07 PROCEDURE — 74011250637 HC RX REV CODE- 250/637: Performed by: NURSE PRACTITIONER

## 2022-01-07 PROCEDURE — 97530 THERAPEUTIC ACTIVITIES: CPT

## 2022-01-07 PROCEDURE — 85027 COMPLETE CBC AUTOMATED: CPT

## 2022-01-07 PROCEDURE — P9045 ALBUMIN (HUMAN), 5%, 250 ML: HCPCS | Performed by: PHYSICIAN ASSISTANT

## 2022-01-07 PROCEDURE — 74011000250 HC RX REV CODE- 250: Performed by: PHYSICIAN ASSISTANT

## 2022-01-07 PROCEDURE — 77010033678 HC OXYGEN DAILY

## 2022-01-07 PROCEDURE — 82962 GLUCOSE BLOOD TEST: CPT

## 2022-01-07 PROCEDURE — 85025 COMPLETE CBC W/AUTO DIFF WBC: CPT

## 2022-01-07 PROCEDURE — 82375 ASSAY CARBOXYHB QUANT: CPT

## 2022-01-07 PROCEDURE — 74011250636 HC RX REV CODE- 250/636: Performed by: PHYSICIAN ASSISTANT

## 2022-01-07 PROCEDURE — 80053 COMPREHEN METABOLIC PANEL: CPT

## 2022-01-07 PROCEDURE — 36415 COLL VENOUS BLD VENIPUNCTURE: CPT

## 2022-01-07 PROCEDURE — 74011250637 HC RX REV CODE- 250/637: Performed by: PHYSICIAN ASSISTANT

## 2022-01-07 PROCEDURE — 93005 ELECTROCARDIOGRAM TRACING: CPT

## 2022-01-07 PROCEDURE — 97535 SELF CARE MNGMENT TRAINING: CPT

## 2022-01-07 RX ORDER — AMLODIPINE BESYLATE 2.5 MG/1
2.5 TABLET ORAL DAILY
Status: DISCONTINUED | OUTPATIENT
Start: 2022-01-07 | End: 2022-01-09

## 2022-01-07 RX ORDER — SODIUM CHLORIDE 0.9 % (FLUSH) 0.9 %
5-40 SYRINGE (ML) INJECTION EVERY 8 HOURS
Status: DISCONTINUED | OUTPATIENT
Start: 2022-01-07 | End: 2022-01-11 | Stop reason: HOSPADM

## 2022-01-07 RX ORDER — HEPARIN SODIUM 1000 [USP'U]/ML
4000 INJECTION, SOLUTION INTRAVENOUS; SUBCUTANEOUS AS NEEDED
Status: DISCONTINUED | OUTPATIENT
Start: 2022-01-07 | End: 2022-01-07

## 2022-01-07 RX ORDER — DIAZEPAM 10 MG/2ML
20 INJECTION INTRAMUSCULAR
Status: DISCONTINUED | OUTPATIENT
Start: 2022-01-07 | End: 2022-01-11 | Stop reason: HOSPADM

## 2022-01-07 RX ORDER — HEPARIN SODIUM 10000 [USP'U]/100ML
10.5-25 INJECTION, SOLUTION INTRAVENOUS
Status: DISCONTINUED | OUTPATIENT
Start: 2022-01-07 | End: 2022-01-10

## 2022-01-07 RX ORDER — HEPARIN SODIUM 10000 [USP'U]/100ML
10.5-25 INJECTION, SOLUTION INTRAVENOUS
Status: DISCONTINUED | OUTPATIENT
Start: 2022-01-07 | End: 2022-01-07

## 2022-01-07 RX ORDER — FOLIC ACID 1 MG/1
1 TABLET ORAL DAILY
Status: DISCONTINUED | OUTPATIENT
Start: 2022-01-08 | End: 2022-01-11 | Stop reason: HOSPADM

## 2022-01-07 RX ORDER — SODIUM CHLORIDE 0.9 % (FLUSH) 0.9 %
5-40 SYRINGE (ML) INJECTION AS NEEDED
Status: DISCONTINUED | OUTPATIENT
Start: 2022-01-07 | End: 2022-01-11 | Stop reason: HOSPADM

## 2022-01-07 RX ORDER — ASPIRIN 325 MG/1
100 TABLET, FILM COATED ORAL DAILY
Status: DISCONTINUED | OUTPATIENT
Start: 2022-01-08 | End: 2022-01-11 | Stop reason: HOSPADM

## 2022-01-07 RX ORDER — HEPARIN SODIUM 10000 [USP'U]/100ML
11-25 INJECTION, SOLUTION INTRAVENOUS
Status: DISCONTINUED | OUTPATIENT
Start: 2022-01-07 | End: 2022-01-07

## 2022-01-07 RX ORDER — CHLORDIAZEPOXIDE HYDROCHLORIDE 25 MG/1
25 CAPSULE, GELATIN COATED ORAL EVERY EVENING
Status: DISCONTINUED | OUTPATIENT
Start: 2022-01-07 | End: 2022-01-11 | Stop reason: HOSPADM

## 2022-01-07 RX ORDER — HEPARIN SODIUM 1000 [USP'U]/ML
4000 INJECTION, SOLUTION INTRAVENOUS; SUBCUTANEOUS AS NEEDED
Status: DISCONTINUED | OUTPATIENT
Start: 2022-01-07 | End: 2022-01-10

## 2022-01-07 RX ORDER — DIAZEPAM 10 MG/2ML
10 INJECTION INTRAMUSCULAR
Status: DISCONTINUED | OUTPATIENT
Start: 2022-01-07 | End: 2022-01-11 | Stop reason: HOSPADM

## 2022-01-07 RX ORDER — HEPARIN SODIUM 10000 [USP'U]/100ML
11-25 INJECTION, SOLUTION INTRAVENOUS
Status: DISCONTINUED | OUTPATIENT
Start: 2022-01-07 | End: 2022-01-07 | Stop reason: SDUPTHER

## 2022-01-07 RX ORDER — WARFARIN 2.5 MG/1
2.5 TABLET ORAL ONCE
Status: COMPLETED | OUTPATIENT
Start: 2022-01-07 | End: 2022-01-07

## 2022-01-07 RX ORDER — HEPARIN SODIUM 1000 [USP'U]/ML
2000 INJECTION, SOLUTION INTRAVENOUS; SUBCUTANEOUS AS NEEDED
Status: DISCONTINUED | OUTPATIENT
Start: 2022-01-07 | End: 2022-01-10

## 2022-01-07 RX ORDER — HEPARIN SODIUM 1000 [USP'U]/ML
2000 INJECTION, SOLUTION INTRAVENOUS; SUBCUTANEOUS AS NEEDED
Status: DISCONTINUED | OUTPATIENT
Start: 2022-01-07 | End: 2022-01-07

## 2022-01-07 RX ORDER — HEPARIN SODIUM 5000 [USP'U]/ML
5000 INJECTION, SOLUTION INTRAVENOUS; SUBCUTANEOUS ONCE
Status: COMPLETED | OUTPATIENT
Start: 2022-01-07 | End: 2022-01-07

## 2022-01-07 RX ORDER — MORPHINE SULFATE 2 MG/ML
4 INJECTION, SOLUTION INTRAMUSCULAR; INTRAVENOUS
Status: DISCONTINUED | OUTPATIENT
Start: 2022-01-07 | End: 2022-01-08

## 2022-01-07 RX ORDER — HYDRALAZINE HYDROCHLORIDE 20 MG/ML
20 INJECTION INTRAMUSCULAR; INTRAVENOUS
Status: DISCONTINUED | OUTPATIENT
Start: 2022-01-07 | End: 2022-01-11 | Stop reason: HOSPADM

## 2022-01-07 RX ADMIN — AMLODIPINE BESYLATE 2.5 MG: 5 TABLET ORAL at 10:30

## 2022-01-07 RX ADMIN — WARFARIN SODIUM 2.5 MG: 2.5 TABLET ORAL at 18:03

## 2022-01-07 RX ADMIN — CHLORHEXIDINE GLUCONATE 10 ML: 1.2 RINSE ORAL at 08:59

## 2022-01-07 RX ADMIN — DOCUSATE SODIUM 50MG AND SENNOSIDES 8.6MG 1 TABLET: 8.6; 5 TABLET, FILM COATED ORAL at 08:58

## 2022-01-07 RX ADMIN — GABAPENTIN 100 MG: 100 CAPSULE ORAL at 22:17

## 2022-01-07 RX ADMIN — ONDANSETRON 4 MG: 2 INJECTION INTRAMUSCULAR; INTRAVENOUS at 05:01

## 2022-01-07 RX ADMIN — ACETAMINOPHEN 650 MG: 325 TABLET ORAL at 22:19

## 2022-01-07 RX ADMIN — ALBUMIN (HUMAN) 12.5 G: 12.5 INJECTION, SOLUTION INTRAVENOUS at 02:20

## 2022-01-07 RX ADMIN — OXYCODONE 10 MG: 5 TABLET ORAL at 06:27

## 2022-01-07 RX ADMIN — ACETAMINOPHEN 1000 MG: 500 TABLET ORAL at 04:28

## 2022-01-07 RX ADMIN — WATER 2 G: 1 INJECTION INTRAMUSCULAR; INTRAVENOUS; SUBCUTANEOUS at 06:28

## 2022-01-07 RX ADMIN — SODIUM CHLORIDE 2.5 MG/HR: 9 INJECTION, SOLUTION INTRAVENOUS at 02:14

## 2022-01-07 RX ADMIN — SODIUM CHLORIDE, PRESERVATIVE FREE 10 ML: 5 INJECTION INTRAVENOUS at 22:18

## 2022-01-07 RX ADMIN — CHLORDIAZEPOXIDE HYDROCHLORIDE 25 MG: 25 CAPSULE ORAL at 18:02

## 2022-01-07 RX ADMIN — POLYETHYLENE GLYCOL 3350 17 G: 17 POWDER, FOR SOLUTION ORAL at 08:58

## 2022-01-07 RX ADMIN — SODIUM CHLORIDE, PRESERVATIVE FREE 10 ML: 5 INJECTION INTRAVENOUS at 05:33

## 2022-01-07 RX ADMIN — ACETAMINOPHEN 1000 MG: 500 TABLET ORAL at 09:01

## 2022-01-07 RX ADMIN — HEPARIN SODIUM 5000 UNITS: 5000 INJECTION INTRAVENOUS; SUBCUTANEOUS at 11:33

## 2022-01-07 RX ADMIN — FAMOTIDINE 20 MG: 20 TABLET ORAL at 06:27

## 2022-01-07 RX ADMIN — FAMOTIDINE 20 MG: 20 TABLET ORAL at 22:17

## 2022-01-07 RX ADMIN — SODIUM CHLORIDE, PRESERVATIVE FREE 10 ML: 5 INJECTION INTRAVENOUS at 15:30

## 2022-01-07 RX ADMIN — CHLORHEXIDINE GLUCONATE 10 ML: 1.2 RINSE ORAL at 22:16

## 2022-01-07 RX ADMIN — HEPARIN SODIUM 11 UNITS/KG/HR: 10000 INJECTION, SOLUTION INTRAVENOUS at 11:36

## 2022-01-07 RX ADMIN — MUPIROCIN: 20 OINTMENT TOPICAL at 08:58

## 2022-01-07 RX ADMIN — GABAPENTIN 100 MG: 100 CAPSULE ORAL at 16:35

## 2022-01-07 RX ADMIN — DOCUSATE SODIUM 50MG AND SENNOSIDES 8.6MG 1 TABLET: 8.6; 5 TABLET, FILM COATED ORAL at 18:02

## 2022-01-07 RX ADMIN — SODIUM CHLORIDE 0.2 MCG/KG/HR: 9 INJECTION, SOLUTION INTRAVENOUS at 02:13

## 2022-01-07 RX ADMIN — Medication 400 MG: at 18:03

## 2022-01-07 RX ADMIN — ACETAMINOPHEN 1000 MG: 500 TABLET ORAL at 15:57

## 2022-01-07 RX ADMIN — OXYCODONE 10 MG: 5 TABLET ORAL at 15:57

## 2022-01-07 RX ADMIN — ASPIRIN 81 MG CHEWABLE TABLET 81 MG: 81 TABLET CHEWABLE at 08:58

## 2022-01-07 RX ADMIN — MUPIROCIN: 20 OINTMENT TOPICAL at 22:16

## 2022-01-07 RX ADMIN — WATER 2 G: 1 INJECTION INTRAMUSCULAR; INTRAVENOUS; SUBCUTANEOUS at 12:25

## 2022-01-07 RX ADMIN — SODIUM CHLORIDE, PRESERVATIVE FREE 10 ML: 5 INJECTION INTRAVENOUS at 22:17

## 2022-01-07 RX ADMIN — WATER 2 G: 1 INJECTION INTRAMUSCULAR; INTRAVENOUS; SUBCUTANEOUS at 18:02

## 2022-01-07 RX ADMIN — HYDRALAZINE HYDROCHLORIDE 20 MG: 20 INJECTION INTRAMUSCULAR; INTRAVENOUS at 10:43

## 2022-01-07 RX ADMIN — GABAPENTIN 100 MG: 100 CAPSULE ORAL at 08:57

## 2022-01-07 NOTE — ANESTHESIA POSTPROCEDURE EVALUATION
Post-Anesthesia Evaluation and Assessment    Patient: Jackson Bergeron MRN: [id number]  SSN: [id number]    YOB: 1975  Age: 55 y.o. Sex: male      I have evaluated the patient and they are stable and ready for discharge from the PACU. Cardiovascular Function/Vital Signs  Visit Vitals  /61   Pulse 77   Temp 37.3 °C (99.1 °F)   Resp 15   SpO2 94%       Patient is status post General anesthesia for Procedure(s):  AORTIC VALVE REPLACEMENT (AVR), MECHANICAL VALVE, ECC, DIMITRI AND EPI AORTIC US BY DR Naz Calixto. Nausea/Vomiting: None    Postoperative hydration reviewed and adequate. Pain:  Pain Scale 1: Numeric (0 - 10) (01/07/22 0400)  Pain Intensity 1: 6 (01/07/22 0400)   Managed    Neurological Status:   Neuro (WDL): Exceptions to WDL (reports tingling and numbness to hands   at times) (01/06/22 0700)  Neuro  Neurologic State: Drowsy (01/06/22 2000)  Orientation Level: Oriented X4 (01/06/22 2000)  Cognition: Follows commands (01/06/22 2000)  Speech: Clear (01/06/22 2000)  Assessment L Pupil: Brisk;Round (01/06/22 2000)  Size L Pupil (mm): 3 (01/06/22 2000)  Assessment R Pupil: Brisk;Round (01/06/22 2000)  Size R Pupil (mm): 3 (01/06/22 2000)  LUE Motor Response: Purposeful;Weak (01/06/22 2000)  LLE Motor Response: Purposeful;Weak (01/06/22 2000)  RUE Motor Response: Purposeful;Weak (01/06/22 2000)  RLE Motor Response: Purposeful;Weak (01/06/22 2000)   At baseline    Mental Status, Level of Consciousness: Alert and  oriented to person, place, and time    Pulmonary Status:   O2 Device: Nasal cannula (01/07/22 0000)   Adequate oxygenation and airway patent    Complications related to anesthesia: None    Post-anesthesia assessment completed. No concerns    Signed By: Evelin Akhtar MD     January 7, 2022              Procedure(s):  AORTIC VALVE REPLACEMENT (AVR), MECHANICAL VALVE, ECC, DIMITRI AND EPI AORTIC US BY DR Naz Calixto.     general    <BSHSIANPOST>    INITIAL Post-op Vital signs:   Vitals Value Taken Time   /61 01/07/22 0800   Temp 37.3 °C (99.1 °F) 01/07/22 0800   Pulse 75 01/07/22 0811   Resp 11 01/07/22 0811   SpO2 94 % 01/07/22 0811   Vitals shown include unvalidated device data.

## 2022-01-07 NOTE — OP NOTES
[address]  OPERATIVE REPORT    Name:  Niranjan Mcnulty  MR#:  [id number]  :  1975  ACCOUNT #:  [de-identified]  DATE OF SERVICE:  2022      PREOPERATIVE DIAGNOSES:  1. Severe aortic stenosis. 2.  Bicuspid aortic valve. POSTOPERATIVE DIAGNOSES:  1. Severe aortic stenosis. 2.  Bicuspid aortic valve. PROCEDURE PERFORMED:  1. Aortic valve replacement with #25 Romeo mechanical aortic valve. 2.  Debridement of mitral valve annulus and anterior leaflet of the mitral valve calcium. SURGEON:  Melina Durant MD    ASSISTANT:  BRYSON Abbott. The assistance of PA was required due to the critical nature of the surgery. ANESTHESIA:  General endotracheal.    ANESTHESIOLOGIST:  Socorro Eason. Tamiko Guevara MD    COMPLICATIONS:  None. SPECIMENS REMOVED:  AV leaflets. IMPLANTS:  #25 On-X mechanical aortic valve. ESTIMATED BLOOD LOSS:  500 mL. DETAILS:  The patient is a 55-year-old gentleman with a history of severe aortic stenosis due to bicuspid aortopathy, he was referred for aortic valve replacement by Dr. Nichole Melgar. PROCEDURE:  He was prepped and draped in a sterile fashion. A time-out was performed. An incision was made over the sternum. A median sternotomy was performed. The sternal retractor was placed. Heparin was administered for cardiopulmonary bypass. The pericardium was opened widely and laterally. The ascending aorta was inspected with my finger and it was clear of disease and proceeded with aortic and right atrial cannulation. We then placed an antegrade as well as retrograde cardioplegia catheter. When we had a therapeutic ACT, we went on cardiopulmonary bypass. I then placed a left ventricular vent and the heart was allowed to fill and was pulsatile, I then also divided the PA from the aorta and also flushed with cardioplegia.   I then applied the aortic cross-clamp, we had a prompt diastolic arrest with retrograde as well as direct handheld antegrade cardioplegia. We then exposed the aortic valve, this was densely calcified, not only thorough the aortic valve leaflets, but also subannular and extensively on to the anterior mitral leaflet. We spent a considerable amount of time debulking and then completely debriding the annulus and the anterior leaflet of mitral valve. The calcium was in large chunky deposits. We did this safely, we had no perforation of the anterior leaflet or mitral valve and no torn chordae. We then sized the valve for a size 25 mechanical valve. We copiously irrigated the left ventricle with numerous flushes of saline. We then placed our annular sutures circumferentially. We then passed the sutures through the #25 prosthesis sewing ring and seated this into place, it fit nicely, both coronaries were free of obstruction. We then tied the sutures down with Cor-Knot device. We then closed the aortotomy in two layers. We deaired and we released the aortic cross-clamp. We placed atrial and ventricular pacing wires. We weaned from cardiopulmonary bypass with no difficulty and no ionotropic support. We administered protamine, we decannulated, we dried up our surgical sites. We placed two mediastinal chest tubes. The sternum was closed with 10 stainless steel wires and soft tissues in multiple layers. The patient was then safely transported to the CCU. The PA was critical for assistance with opening the chest, exposure of the aortic valve, sewing in the aortic valve, closing the aortotomy, decannulation, and closure of the wound.         MD ZEE Dumont/[id number]  D:  01/06/2022 13:51  T:  01/06/2022 21:57  JOB #:  [id number]

## 2022-01-07 NOTE — DIABETES MGMT
[facility]    CLINICAL NURSE SPECIALIST CONSULT     Initial Presentation   Pedro Muñiz is a 55 y.o. male admitted 1/6/22 for planned CV surgical intervention. ECHO: EF 60%, calcified AoV with severe AS mean gradient 45 mmhg. Mild LAE. RVSP 28 mmhg. Mildly dilated ascending aorta 42 mm. CTA chest:  Arteriogram:  Thoracic aorta: Coarse aortic valvular calcifications. Minimal aneurysmal  dilatation of the ascending aorta measuring 4 cm. No significant thoracic aortic  stenosis. Great vessel origins are patent    HX:   Past Medical History:   Diagnosis Date    Aortic stenosis     Aortic valve stenosis         INITIAL DX:   Aortic stenosis [I35.0]     Current Treatment     TX: 1/6/22 AORTIC VALVE REPLACEMENT (AVR) with 25mm mechanical Fowler valve  Debridement of calcium off anterior leaflet of the mitral valve    Consulted by Provider for advanced diabetes nursing assessment and care for:   [x] Transitioning off Marc Reasons   [x] Inpatient management strategy  [] Home management assessment  [] Survival skill education    Hospital Course   Clinical progress has been complicated by pain management issues. Diabetes History   This patient does not have diabetes. No family history of diabetes    Subjective   I'm having some pain.      Objective   Physical exam  General Overweight male who is in pain at this time     Neuro  Alert, oriented   Vital Signs   Visit Vitals  /69 (BP 1 Location: Left upper arm, BP Patient Position: At rest)   Pulse 82   Temp 98.6 °F (37 °C)   Resp 14   Wt 95 kg (209 lb 7 oz)   SpO2 95%   BMI 29.21 kg/m²     Laboratory  Recent Labs     01/07/22  1405 01/07/22  0418 01/06/22  1716 01/06/22  1306   GLU  --  105* 134* 113*   AGAP  --  6 6 7   WBC 16.0* 12.5*  --  11.9*   CREA  --  1.09 1.13 1.32*   GFRNA  --  >60 >60 58*   AST  --  49* 47* 37   ALT  --  20 23 19     Factors impacting BG management  Factor Dose Comments   Nutrition:  CL meals    Not documented   Pain Scheduled Neurontin    Infection Prophylactic Ancef Q6 hrs    Other:   Kidney function  Liver function   Normal  Elevated AST      Blood glucose pattern      Significant diabetes-related events over the past 24-72 hours  Steady BG pattern with little need for insulin    Assessment and Plan   Nursing Diagnosis Risk for unstable blood glucose pattern   Nursing Intervention Domain 9354 Decision-making Support   Nursing Interventions Examined current inpatient diabetes/blood glucose control   Explored factors facilitating and impeding inpatient management     Evaluation   This overweight  male did not have diabetes PTA. No family history of diabetes. Main Cinnamon in use for first 48 hours pos-CV surgery. BGs easily controlled. Expect patient to transition off GS without incident. Recommendations     [x] Glucostabilizer X48 hrs; transition off using CV post-op protocol. Switch BG checks to QID X1-2 days    Billing Code(s)   [x] 03652 IP subsequent hospital care - 35 minutes    Before making these care recommendations, I personally reviewed the hospitalization record, including notes, laboratory & diagnostic data and current medications, and examined the patient at the bedside (circumstances permitting) before making care recommendations. More than fifty (50) percent of the time was spent in patient counseling and/or care coordination.   Total minutes: Maryan Mahajan CNS  Diabetes Clinical Nurse Specialist  [facility]  Access via [facility]

## 2022-01-07 NOTE — PROGRESS NOTES
[address] [place] [address] follow-up PCP transitional care appointment has been scheduled with Dr. Hakn Prince on 1/7/2022 at 11:30am.  Pending patient discharge.   Laura Patient, Care Management Assistant

## 2022-01-07 NOTE — PROGRESS NOTES
[facility] ICU Progress Note    Admit Date: 2022  POD:  1 Day Post-Op    Procedure:  Procedure(s):  AORTIC VALVE REPLACEMENT (AVR), MECHANICAL VALVE, ECC, DIMITRI AND EPI AORTIC US BY DR Anselmo Biggs        Subjective:   Pt seen with Dr. Christiano Bashir. Afebrile, on 4L NC. On cardene 2.5, insulin, dilauded PCA. Up in chair. No complaints. NSR LBBB     Objective:   Vitals:  Blood pressure 123/66, pulse 72, temperature 99.1 °F (37.3 °C), resp. rate 15, weight 209 lb 7 oz (95 kg), SpO2 96 %. Temp (24hrs), Av.4 °F (36.9 °C), Min:97.2 °F (36.2 °C), Max:100.1 °F (37.8 °C)    Hemodynamics:   CO: CO (l/min): 7.1 l/min   CI: CI (l/min/m2): 3.3 l/min/m2   CVP: CVP (mmHg): 12 mmHg (22 0500)   SVR: SVR (dyne*sec)/cm5: 747 (dyne*sec)/cm5 (22 9188)   PAP Systolic: PAP Systolic: 33 (00 5925)   PAP Diastolic: PAP Diastolic: 10 (36 9400)   PVR:     SV02: SVO2 (%): 60 % (22 0500)   SCV02:      EKG/Rhythm:  NSR LBBB    Extubation Date / Time: 22 1515    CT Output: 550 ml/24 hrs    Ventilator:  Ventilator Volumes  Vt Set (ml): 520 ml (22 1247)  Vt Exhaled (Machine Breath) (ml): 491 ml (22 1247)  Vt Spont (ml): 652 ml (22 1430)  Ve Observed (l/min): 8.65 l/min (22 1247)      Oxygen Therapy:  Oxygen Therapy  O2 Sat (%): 96 % (22 1000)  Pulse via Oximetry: 72 beats per minute (22 1000)  O2 Device: Nasal cannula (22 1000)  O2 Flow Rate (L/min): 4 l/min (22 1000)  FIO2 (%): 50 % (22 1500)    CXR:   CXR Results  (Last 48 hours)               22 0541  XR CHEST PORT Final result    Impression:  Tubes and lines, as described above. Mild left basilar atelectasis. Narrative:  INDICATION: Postop heart. Portable AP semiupright view of the chest.       Direct comparison made to prior chest x-ray dated 2022. Cardiomediastinal silhouette is stable. Median sternotomy wires are noted.  There   has been interval extubation and interval removal of nasogastric tube. [place]   catheter tip overlies the main pulmonary outflow tract. There is no   pneumothorax. There is mild left basilar atelectasis. No pleural fluid is   visualized on this single AP view. Right lung is grossly clear. 01/06/22 1309  XR CHEST PORT Final result    Impression:  1. Interval placement of an ET tube which terminates approximately 2 cm above   the clavicular heads, consider advancement. 2. Right IJ approach catheter and gastric tube as above. Narrative:  INDICATION: Postop, upon arrival. postop heart   Additional history:   COMPARISON: Previous chest xray, 12/29/2020. LIMITATIONS: Portable technique. Rhenda Bethanie FINDINGS: Single frontal view of the chest.    .   Lines/tubes/surgical: An ET tube projects over the airway and terminates   approximately 2 cm above the clavicular heads. A right IJ approach [place]   catheter is indistinct at its termination, likely in the pulmonary outflow   tract. A gastric tube traverses the expected course of the esophagus, extends   into the left upper quadrant and terminates below the image Query   mediastinal/chest drain. Heart/mediastinum: Status post median sternotomy. Lungs/pleura: Low lung volumes without definite consolidation. No visible   pneumothorax. Additional Comments: None. .             Admission Weight: Last Weight   Weight: 209 lb 7 oz (95 kg) Weight: 209 lb 7 oz (95 kg)     Intake / Output / Drain:  Current Shift: 01/07 0701 - 01/07 1900  In: 42.7 [I.V.:42.7]  Out: 100 [Drains:100]  Last 24 hrs.:     Intake/Output Summary (Last 24 hours) at 1/7/2022 1023  Last data filed at 1/7/2022 0800  Gross per 24 hour   Intake 3609.75 ml   Output 2497 ml   Net 1112.75 ml       EXAM:  General:   Alert, NAD                                                                                           Lungs:   Clear upper, diminished bases to auscultation bilaterally.    Incision:  dsg cdi   Heart:  Regular rate and rhythm, S1, S2 normal, no murmur, click, rub or gallop. Abdomen:   Soft, non-tender. Bowel sounds hypoactive. No masses,  No organomegaly. Extremities:  No edema. PPP. Neurologic:  Gross motor and sensory apparatus intact. Labs:   Recent Labs     22  1009 22  0525 22  0418 22  1310 22  1306   WBC  --   --  12.5*   < > 11.9*   HGB  --   --  10.9*   < > 12.2   HCT  --   --  31.9*   < > 34.9*   PLT  --   --  128*   < > 134*   NA  --   --  138   < > 140   K  --   --  4.6   < > 4.0   BUN  --   --  16   < > 14   CREA  --   --  1.09   < > 1.32*   GLU  --   --  105*   < > 113*   GLUCPOC 104   < >  --    < >  --    INR  --   --   --   --  1.1    < > = values in this interval not displayed. 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 AS s/p AVR mechanical (on-x valve): On ASA, start heparin gtt, start coumadin tonight. INR goal 2-3 for 3 months and 1.5-2 thereafter. Pharmacy to dose coumadin this weekend. Keep CT until INR closer to goal.     2. HTN: on cardene gtt, start norvasc, PRN hydralazine. Wean cardene gtt off.     3. New LBBB: initially pacer dependent, now NSR with LBBB, monitor rhythm, has pacing wires. 4. Atelectasis: Encourage I/S, wean O2 for sats > 92%    5. Post op anemia st acute blood loss: Monitor H&H and CT output    6. Leukocytosis: Mild, afebrile, monitor    7. Thrombocytopenia: Mild, trend    8. Alcohol use: Pt drinks beer daily (more then 3-6 per day per patient report). Start thiamine and folic acid, librium qhs, prn valium for CIWA protocol. 9. Dispo: PT/OT, transfer to stepdown. Working towards home with [facility] likely early next week.      Signed By: Chaparro Adams NP

## 2022-01-07 NOTE — PROGRESS NOTES
1330- TRANSFER - IN REPORT:    Verbal report received from Via Karan Miller RN(name) on [employer]  being received from CCU (unit) for routine progression of care      Report consisted of patients Situation, Background, Assessment and   Recommendations(SBAR). Information from the following report(s) SBAR, Kardex, ED Summary, Intake/Output, MAR, Recent Results, Med Rec Status and Cardiac Rhythm NSR BBB was reviewed with the receiving nurse. Opportunity for questions and clarification was provided. Assessment completed upon patients arrival to unit and care assumed. 1400- Heparin gtt stopped. CBC sent. Monitoring CT output. Insulin gtt on hold per glucostabilizer. 1500- PCA stopped. Oral pain meds given. Pt resting comfortably. Per CTS team will not restart heparin d/t sanguinous output from CT. Will monitor output. 1900- End of Shift Note    Bedside shift change report given to  (oncoming nurse) by Carl Cabrera RN (offgoing nurse). Report included the following information SBAR, Kardex, ED Summary, Intake/Output, MAR, Recent Results, Med Rec Status and Cardiac Rhythm NSR w/ BBB    Shift worked:  7a-7p     Shift summary and any significant changes:     Monitoring CT output. Heparin off. Insulin gtt on hold per glucostabilizer. PCA stopped, oral meds given for pain control. CBC stable. Concerns for physician to address:       Zone phone for oncoming shift:          Activity:  Activity Level:  Up with Assistance  Number times ambulated in hallways past shift: 0  Number of times OOB to chair past shift: 1    Cardiac:   Cardiac Monitoring: Yes      Cardiac Rhythm: Sinus Rhythm,BBB    Access:   Current line(s): PIV     Genitourinary:   Urinary status: voiding    Respiratory:   O2 Device: Nasal cannula  Chronic home O2 use?: NO  Incentive spirometer at bedside: YES  Actual Volume (ml): 750 ml  GI:  Last Bowel Movement Date: 01/05/22  Current diet:  ADULT DIET Clear Liquid  Passing flatus: YES  Tolerating current diet: YES       Pain Management:   Patient states pain is manageable on current regimen: YES    Skin:  Delmar Score: 17  Interventions: turn team, increase time out of bed and PT/OT consult    Patient Safety:  Fall Score:  Total Score: 3  Interventions: bed/chair alarm, assistive device (walker, cane, etc), gripper socks and pt to call before getting OOB  High Fall Risk: Yes    Length of Stay:  Expected LOS: 3d 19h  Actual LOS: [address] [facility] Yunier, RN

## 2022-01-07 NOTE — PROGRESS NOTES
Pharmacist Daily Dosing of Warfarin    Indication & Goal INR: Mechanical Valve, INR Goal 2-3 for three months and then down to 1.5 - 2 (On-X valve)    PTA Warfarin Dose: new start    Notable concurrent conditions and medications: Amiodarone    Labs:  Recent Labs     01/07/22  0418 01/06/22  1716 01/06/22  1716 01/06/22  1306 01/06/22  1306   INR  --   --   --   --  1.1   HGB 10.9*   < > 12.8   < > 12.2   *  --   --   --  134*   TBILI 0.6  --  0.8  --  0.4   ALB 3.8   < > 3.7   < > 3.2*    < > = values in this interval not displayed. Impression/Plan:   Will order warfarin 2.5 mg PO x 1 dose. Daily INR has been ordered  CBC w/o differential every other day has been ordered     Pharmacy will follow daily and adjust the dose as appropriate.     Thank you,  Troy Rodriguez PHARMD      Warfarin Protocol    Located on pharmacy Teams site: Clinical Practice -> Anticoagulation & Cardiology -> Anticoagulation Policies, Protocols, Guidance

## 2022-01-07 NOTE — ADT AUTH CERT NOTES
Comments  Comment        [facility]     FACILITY NPI :[id number]       [facility]  MRM 2 PROGRESSIVE CARE  [address] [facility]  [address] [address] [phone]      DEPT   [address] [phone]  FAX. [phone]            Patient Name :Bryce Rizzo   : 1975 (46 yrs)  MRN : [id number]     Patient Mailing Address [address] [place] [address]*                                          [address] , [address]                                                             .         Insurance Plan Payor: [facility] / Plan: [address] [place] [address] / Product Type: PPO /      Primary Coverage Subscriber ID : [id number]        Current Patient Class : INPATIENT  Admit Date : 2022     REQUESTED LEVEL OF CARE: INPATIENT [101]                                                           Diagnosis : Aortic stenosis                          ICD10 Code : Aortic stenosis [I35.0]     Current Room and Bed      Admitting and Attending Info:  Admitting Provider : Tiago Peterson MD   NPI: [id number]  Admitting Provider Phone. [phone]  Admitting Provider Address: [address] [place] [address], [facility]     Attending Provider Louie Lira MD   [id number]  Attending Provider Address:  [address]                                                    [address] [facility]     Attending Provider Phone: Attending manan phone: [phone]

## 2022-01-07 NOTE — PROGRESS NOTES
Cardiac Surgery Care Coordinator-  Met with Joyce Reveles. Reviewed plan of care and discussed goals for the day. Joyce Reveles has a good understanding of his plan for the day. Reinforced sternal precautions and encouraged continued use of the incentive spirometer. Joyce Reveles can pull 750ml. Encouraged Joyce Reveles to verbalize. Will continue to follow for educational and emotional needs.  Keila Duong RN

## 2022-01-07 NOTE — PROGRESS NOTES
Problem: Self Care Deficits Care Plan (Adult)  Goal: *Acute Goals and Plan of Care (Insert Text)  Description: FUNCTIONAL STATUS PRIOR TO ADMISSION: Patient was independent and active without use of DME.     HOME SUPPORT: The patient lived alone with family to provide assistance. Occupational Therapy Goals  Initiated 1/7/2022  1. Patient will perform ADLs standing 5 mins without fatigue or LOB with supervision/set-up within 7 day(s). 2.  Patient will perform lower body ADLs with supervision/set-up within 7 day(s). 3.  Patient will perform gathering ADL items high and low 2/2 with supervision/set-up within 7 day(s). 4.  Patient will perform toilet transfers with supervision/set-up within 7 day(s). 5.  Patient will perform all aspects of toileting with supervision/set-up within 7 day(s). 6.  Patient will participate in cardiac/sternal upper extremity therapeutic exercise/activities to increase independence with ADLs with supervision/set-up for 5 minutes within 7 day(s). Outcome: Not Met   OCCUPATIONAL THERAPY EVALUATION  Patient: Bryce Rogerserfield (89 y.o. male)  Date: 1/7/2022  Primary Diagnosis: Aortic stenosis [I35.0]  Procedure(s) (LRB):  AORTIC VALVE REPLACEMENT (AVR), MECHANICAL VALVE, ECC, DIMITRI AND EPI AORTIC US BY DR Tolu Martinez (N/A) 1 Day Post-Op   Precautions:   Sternal (move in the tube)    ASSESSMENT  Based on the objective data described below, the patient presents with decreased endurance, strength, functional mobility, ADLs and pt is on  4 liters of NC. Pt was living at home with family and working, active, and independent with ADLs and ILS with no AD. Pt was sitting up in chair and on 4 liters of NC, and stated he was having increased difficulty with breathing, and assisted pt to come to sitting up, he was in a reclined position. Pt was educated on move in the tube/sternal precautions, and he was able to perform BUE cardiac ex sitting on EOb.   Pt was CGA to stand from chair and CGA for transfer to bed and to walk in room with HHA. Pt was min to mod of 2 for sit to supine and left in bed with nursing in room . Pt is progressing and moving well at [facility] and feel that he will be able to return home with no further OT. Patient is not verbalizing and is demonstrating understanding of mindful-based movements (\"move in the tube\") principles of keeping UEs proximal to ribcage to prevent lateral pull on the sternum during load-bearing activities with verbal cues required for compliance. Current Level of Function Impacting Discharge (ADLs/self-care): max for ADLs     Functional Outcome Measure: The patient scored 45/100 on the Barthel outcome measure which is indicative of max for ADLs. Patient will benefit from skilled therapy intervention to address the above noted impairments. PLAN :  Recommendations and Planned Interventions: self care training, functional mobility training, therapeutic exercise, balance training, therapeutic activities, endurance activities, patient education, and home safety training    Frequency/Duration: Patient will be followed by occupational therapy 5 times a week to address goals. Recommendation for discharge: (in order for the patient to meet his/her long term goals)  No skilled occupational therapy/ follow up rehabilitation needs identified at this time. This discharge recommendation:  Has been made in collaboration with the attending provider and/or case management    IF patient discharges home will need the following DME: tbd       SUBJECTIVE:   Patient stated Its hard to breathe.     OBJECTIVE DATA SUMMARY:   HISTORY:   Past Medical History:   Diagnosis Date    Aortic stenosis     Aortic valve stenosis    History reviewed. No pertinent surgical history.     Expanded or extensive additional review of patient history:     Home Situation  Home Environment: Private residence  # Steps to Enter: 2  Rails to Enter: Yes  Hand Rails : Bilateral  One/Two Story Residence: One story  Living Alone: No  Support Systems: Spouse/Significant Other,Child(mariluz)  Patient Expects to be Discharged to[de-identified] House  Current DME Used/Available at Home: Grab bars  Tub or Shower Type: Tub/Shower combination    Hand dominance: Right    EXAMINATION OF PERFORMANCE DEFICITS:  Cognitive/Behavioral Status:  Neurologic State: Alert  Orientation Level: Oriented X4  Cognition: Follows commands  Perception: Appears intact  Perseveration: No perseveration noted  Safety/Judgement: Fall prevention    Skin: in fair health     Edema: none     Hearing:intact       Vision/Perceptual:                    Intact                  Range of Motion:    AROM: Generally decreased, functional  PROM: Generally decreased, functional                      Strength:    Strength: Generally decreased, functional                Coordination:  Coordination: Within functional limits  Fine Motor Skills-Upper: Left Intact; Right Intact    Gross Motor Skills-Upper: Left Intact; Right Intact    Tone & Sensation:    Tone: Normal  Sensation: Intact                      Balance:  Sitting: Intact; Without support  Standing: Impaired; Without support  Standing - Static: Good  Standing - Dynamic : Good    Functional Mobility and Transfers for ADLs:  Bed Mobility:  Rolling: Contact guard assistance  Sit to Supine: Minimum assistance  Scooting: Contact guard assistance    Transfers:  Sit to Stand: Contact guard assistance  Stand to Sit: Contact guard assistance  Bed to Chair: Contact guard assistance  Toilet Transfer : Contact guard assistance    ADL Assessment:  Feeding: Setup    Oral Facial Hygiene/Grooming: Setup    Bathing: Maximum assistance;Minimum assistance    Upper Body Dressing: Minimum assistance; Moderate assistance    Lower Body Dressing: Maximum assistance; Moderate assistance    Toileting: Maximum assistance;Minimum assistance              Cognitive Retraining  Safety/Judgement: Fall prevention    Patient instructed and educated on mindful movement principles based on Move in The Tube concept to include maintaining bilateral elbows close to rib cage when performing any load-bearing activity such as getting in/out of bed, pushing up from a chair, opening a door, or lifting a box. Patient was given a handout with diagrams of each correct/incorrect method of performing each of the above tasks. Patient instructed on the ability to utilize upper extremities outside the tube when doing any non-load bearing activity such as washing hair/body, brushing teeth, retrieving clothing items, or scratching your back. Patient encouraged to also perform upper extremity exercises \"outside of the tube\" to prevent scar tissue formation around sternal incision site. Patient instructed in detail about activities to heed with caution, allowing pain to be the guide. These activities include but are not limited to: mowing the lawn, riding a bike, walking a dog, lifting a child, workshop hobbies, golfing, sexual activity, vacuuming, fishing, scrubbing the floors, and moving furniture. Patient was given the [address] [place] handout to describe each of these activities in detail. Patient instructed no asymmetrical reaching over head to ensure B UEs when shoulders >90* i.e. reaching in cabinets and dressing. Instruction on upper body dressing techniques of over head, then arms through to decrease pain and unilateral shoulder flexion >90*. Instruction on the benefits of utilizing B UEs during functional tasks i.e. opening the fridge, stepping into the tub. Therapeutic Exercises:   Patient instructed on the benefits and demonstrated cardiac exercises while sitting with Contact guard assistance. Instructed and indicated understanding on how to progress reps, sets against gravity, pacing through progressive muscle strengthening standing based on surgeon clearance for more weight in prep for basic and instrumental ADLs. Instruction on the use of household items in place of weights as needed. CARDIAC   EXERCISE    Sets    Reps    Active  Active Assist    Passive  Self ROM    Comments    Shoulder flexion  1  5   [x]                            []                             []                             []                                Shoulder abduction  1  5  [x]                             []                             []                             []                                Scapular elevation  1  5  [x]                             []                              []                             []                                Scapular retraction  1  5  [x]                             []                             []                             []                                Trunk rotation  1  5  [x]                             []                             []                             []                                Trunk sidebending  1  5  [x]                             []                              []                             []                                        Functional Measure:    Barthel Index:  Bathin  Bladder: 10  Bowels: 10  Groomin  Dressin  Feeding: 10  Mobility: 0  Stairs: 0  Toilet Use: 5  Transfer (Bed to Chair and Back): 10  Total: 45/100      The Barthel ADL Index: Guidelines  1. The index should be used as a record of what a patient does, not as a record of what a patient could do. 2. The main aim is to establish degree of independence from any help, physical or verbal, however minor and for whatever reason. 3. The need for supervision renders the patient not independent. 4. A patient's performance should be established using the best available evidence. Asking the patient, friends/relatives and nurses are the usual sources, but direct observation and common sense are also important. However direct testing is not needed.   5. Usually the patient's performance over the preceding 24-48 hours is important, but occasionally longer periods will be relevant. 6. Middle categories imply that the patient supplies over 50 per cent of the effort. 7. Use of aids to be independent is allowed. Score Interpretation (from [facility] 83)    Independent   60-79 Minimally independent   40-59 Partially dependent   20-39 Very dependent   <20 Totally dependent     -Jon Britton., Barthel, D.W. (1965). Functional evaluation: the Barthel Index. [address] ([address] [place] [address] (1997). The Barthel activities of daily living index: self-reporting versus actual performance in the old (> or = 75 years). Journal of [address] [place] [address] 45(7), 14 [facility], CLAUDINE, Erasmo Moya., Lonnie Amos. (1999). Measuring the change in disability after inpatient rehabilitation; comparison of the responsiveness of the Barthel Index and Functional [place] Measure. Journal of Neurology, Neurosurgery, and Psychiatry, 66(4), 352-419. LEN Virk, MARISOL Woo, & Gareth Wiggins, MNiaA. (2004) Assessment of post-stroke quality of life in cost-effectiveness studies: The usefulness of the Barthel Index and the EuroQoL-5D.  Quality of Life Research, 15, 173-99         Occupational Therapy Evaluation Charge Determination   History Examination Decision-Making   MEDIUM Complexity : Expanded review of history including physical, cognitive and psychosocial  history  MEDIUM Complexity : 3-5 performance deficits relating to physical, cognitive , or psychosocial skils that result in activity limitations and / or participation restrictions LOW Complexity : No comorbidities that affect functional and no verbal or physical assistance needed to complete eval tasks       Based on the above components, the patient evaluation is determined to be of the following complexity level: LOW   Pain Rating:  Pt rated pt as 6    Activity Tolerance:   Fair    After treatment patient left in no apparent distress:    Supine in bed and Call bell within reach    COMMUNICATION/EDUCATION:   The patients plan of care was discussed with: Physical therapist and Registered nurse. Patient/family have participated as able in goal setting and plan of care. This patients plan of care is appropriate for delegation to [facility].     Thank you for this referral.  Deshawn Frank, OT  Time Calculation: 24 mins

## 2022-01-07 NOTE — PROGRESS NOTES
Critical Care Progress Note  Aly Frederick MD          Date of Service:  2022  NAME:  Hussein Ricardo  :  1975  MRN:  [id number]      Subjective/Hospital course:      22: Patient reports feeling better this morning. He is on nicardipine drip. Sitting in the chair. Complains of postoperative pain, on Dilaudid PCA. Problem list:     Problem list:  Hospital Problems  Never Reviewed          Codes Class Noted POA    S/P AVR (aortic valve replacement) ICD-10-CM: Z95.2  ICD-9-CM: V43.3  2022 Unknown    Overview Signed 2022 12:22 PM by BRYSON Marquez     AORTIC VALVE REPLACEMENT (AVR) with 25mm mechanical Romeo valve  Debridement of calcium off anterior leaflet of the mitral valve             Aortic stenosis ICD-10-CM: I35.0  ICD-9-CM: 424.1  2021 Unknown              Assessment:      Post cardiac surgery. Status post mechanical AVR. Shock from vasoplegia. Ventilator management. Acute post hemorrhagic anemia from perioperative blood loss. CAD.        Plan:        Closely monitor chest tube output. Monitor hemoglobin. post AVR anticoagulation per CTS. Closely monitor hemodynamics and markers of endorgan perfusion including mental status, urine output and lactate. Diuresis as tolerated. Delirium precautions.     Code status: Full code. DVT prophylaxis: AC for AVR per CTS. I personally spent --- minutes of critical care time. This is time spent at this critically ill patient's bedside actively involved in patient care as well as the coordination of care and discussions with the patient's family. This does not include any procedural time which has been billed separately. Review of Systems:   A comprehensive review of systems was negative except for that written in the HPI.        Vital Signs:     Patient Vitals for the past 4 hrs:   BP Temp Pulse Resp SpO2   22 1000 123/66  72 15 96 %   22 0900 117/65  76 12 94 % 01/07/22 0800 124/61 99.1 °F (37.3 °C) 77 15 94 %        Intake/Output Summary (Last 24 hours) at 1/7/2022 1106  Last data filed at 1/7/2022 1000  Gross per 24 hour   Intake 3609.75 ml   Output 2497 ml   Net 1112.75 ml        Physical Examination:    Physical Exam  Constitutional:       Appearance: Normal appearance. HENT:      Head: Normocephalic and atraumatic. Mouth/Throat:      Mouth: Mucous membranes are moist.   Eyes:      Extraocular Movements: Extraocular movements intact. Conjunctiva/sclera: Conjunctivae normal.   Cardiovascular:      Rate and Rhythm: Normal rate and regular rhythm. Pulmonary:      Effort: Pulmonary effort is normal. No respiratory distress. Breath sounds: Normal breath sounds. Abdominal:      General: Abdomen is flat. There is no distension. Palpations: Abdomen is soft. Tenderness: There is no abdominal tenderness. There is no guarding. Musculoskeletal:      Cervical back: Normal range of motion and neck supple. Neurological:      Mental Status: He is alert and oriented to person, place, and time. Labs:   Labs and imaging reviewed.       Medications:     Current Facility-Administered Medications   Medication Dose Route Frequency    warfarin (COUMADIN) tablet 2.5 mg  2.5 mg Oral ONCE    heparin (porcine) 1,000 unit/mL injection 2,000 Units  2,000 Units IntraVENous PRN    Or    heparin (porcine) 1,000 unit/mL injection 4,000 Units  4,000 Units IntraVENous PRN    heparin (porcine) 25,000 units in 0.45% saline 250 ml infusion  11-25 Units/kg/hr (Order-Specific) IntraVENous TITRATE    WARFARIN INFORMATION NOTE (COUMADIN)   Other QPM    amLODIPine (NORVASC) tablet 2.5 mg  2.5 mg Oral DAILY    hydrALAZINE (APRESOLINE) 20 mg/mL injection 20 mg  20 mg IntraVENous Q6H PRN    sodium chloride (NS) flush 5-40 mL  5-40 mL IntraVENous Q8H    sodium chloride (NS) flush 5-40 mL  5-40 mL IntraVENous PRN    0.45% sodium chloride infusion  10 mL/hr IntraVENous CONTINUOUS    0.9% sodium chloride infusion  10 mL/hr IntraVENous CONTINUOUS    acetaminophen (TYLENOL) tablet 1,000 mg  1,000 mg Oral Q6H    oxyCODONE IR (ROXICODONE) tablet 5 mg  5 mg Oral Q4H PRN    oxyCODONE IR (ROXICODONE) tablet 10 mg  10 mg Oral Q4H PRN    morphine injection 4 mg  4 mg IntraVENous Q2H PRN    naloxone (NARCAN) injection 0.4 mg  0.4 mg IntraVENous PRN    mupirocin (BACTROBAN) 2 % ointment   Both Nostrils BID    ceFAZolin (ANCEF) 2 g in sterile water (preservative free) 20 mL IV syringe  2 g IntraVENous Q6H    ondansetron (ZOFRAN) injection 4 mg  4 mg IntraVENous Q4H PRN    albuterol (PROVENTIL VENTOLIN) nebulizer solution 2.5 mg  2.5 mg Nebulization Q4H PRN    aspirin chewable tablet 81 mg  81 mg Oral DAILY    midazolam (VERSED) injection 1 mg  1 mg IntraVENous Q1H PRN    chlorhexidine (PERIDEX) 0.12 % mouthwash 10 mL  10 mL Oral Q12H    famotidine (PEPCID) tablet 20 mg  20 mg Oral Q12H    magnesium oxide (MAG-OX) tablet 400 mg  400 mg Oral BID    calcium chloride 1 g in 0.9% sodium chloride 250 mL IVPB  1 g IntraVENous PRN    bisacodyL (DULCOLAX) suppository 10 mg  10 mg Rectal DAILY PRN    senna-docusate (PERICOLACE) 8.6-50 mg per tablet 1 Tablet  1 Tablet Oral BID    polyethylene glycol (MIRALAX) packet 17 g  17 g Oral DAILY    ELECTROLYTE REPLACEMENT NOTE: Nurse to review Serum Potassium and Magnesuim levels and Initiate Electrolyte Replacement Protocol as needed  1 Each Other PRN    magnesium sulfate 1 g/100 ml IVPB (premix or compounded)  1 g IntraVENous PRN    glucose chewable tablet 16 g  4 Tablet Oral PRN    dextrose (D50W) injection syrg 12.5-25 g  12.5-25 g IntraVENous PRN    glucagon (GLUCAGEN) injection 1 mg  1 mg IntraMUSCular PRN    insulin lispro (HUMALOG) injection   SubCUTAneous TIDAC    [START ON 1/8/2022] insulin lispro (HUMALOG) injection   SubCUTAneous AC&HS    insulin glargine (LANTUS) injection 1-50 Units  1-50 Units SubCUTAneous ONCE PRN    gabapentin (NEURONTIN) capsule 100 mg  100 mg Oral TID    acetaminophen (TYLENOL) tablet 650 mg  650 mg Oral Q4H PRN    melatonin tablet 3-6 mg  3-6 mg Oral QHS PRN    diphenhydrAMINE (BENADRYL) capsule 25 mg  25 mg Oral QHS PRN    heparin (porcine) 1,000 unit/mL injection    PRN    HYDROmorphone (PF) 25 mg/50 mL (DILAUDID) PCA   IntraVENous TITRATE    insulin regular (NOVOLIN R, HUMULIN R) 100 Units in 0.9% sodium chloride 100 mL infusion  1-10 Units/hr IntraVENous TITRATE    niCARdipine (CARDENE) 25 mg in 0.9% sodium chloride 250 mL infusion  0-15 mg/hr IntraVENous TITRATE     ______________________________________________________________________  EXPECTED LENGTH OF STAY: - - -  ACTUAL LENGTH OF STAY:          [address] Isabelle Ochoa, MD   Pulmonary/CCM  [address] [phone]  1/7/2022

## 2022-01-07 NOTE — PROGRESS NOTES
1900 - Bedside shift change report given to SUMIT Chandler (oncoming nurse) by Monalisa Parish RN (offgoing nurse). Report included the following information SBAR, Kardex, Intake/Output, MAR, Recent Results and Cardiac Rhythm SR w/ BBB. 2000 - Shift assessment complete, see flowsheets for details. Pt A&Ox4, moves all extremities purposefully. SR with BBB on monitor. Pt complaining of pain 5/10 in chest, educated on pt on PCA use. Lines:  R IJ [place]/Mac  20 g R AC  R radial A-line  Grace  CT    Drips:  Dilaudid continuous and PCA  Insulin     2351 - Arterial SBP > 120, Cardene restarted at 2.5 mg/hr. 0000 - Reassessment complete, no changes to previous assessment. 0207 - SVO2 recalibrated. Carboxyhemoglobin drawn. 0400 - Reassessment complete, no changes to previous assessment. Morning labs drawn and sent. EKG completed. 2214 Tod Border removed    0600 -  END OF SHIFT SUMMARY    Drips: Cardene 2.5 mg,    Telemetry: normal sinus rhythm and BBB    Hemodynamics: SVO2 60, CO 7.1, CI 3.3 (0500). Arterial /58 (74)    UOP: 2022 mL /12hr    CT: 550/12hr    I/O 24hr: +934.1 mL    Pertinent labs: K 4.6 and Mag 2.5    Pain control: poor     Pertinent physical assessment: N/A     0614 - Grace removed. 5864 - Pt OOB to chair    0700 - Bedside shift change report given to Juan Francisco Lafleur RN (oncoming nurse) by SUMIT Chandler (offgoing nurse). Report included the following information SBAR, Kardex, Intake/Output, MAR, Recent Results and Cardiac Rhythm SR w/ BBB.

## 2022-01-07 NOTE — PROGRESS NOTES
Transition of Care Plan:    RUR: 7%  Disposition: Home with Home Health ([facility])  Follow up appointments: PCP  DME needed: None  Transportation at Discharge: Pt's wife will transport at d/c.  [address] or means to access home:  Pt has access      IM Medicare Letter: Not needed  Is patient a BCPI-A Bundle:  N/A         If yes, was Bundle Letter given?:  N/A  Is patient a  and connected with the [employer] ? N/A  If yes, was [employer] transfer form completed and [facility] notified? N/A  Caregiver Contact: Von Party- Wife [phone]  Discharge Caregiver contacted prior to discharge? CM will notify caregiver at d/c.     4:00pm- [facility] accepted referral.     3:00pm- CM sent referral to AT [address] [place] [address] via [employer] for home health     CM will continue to follow patient for discharge planning needs and arrange for services as deemed necessary.     Jolene Montero [address] [place] [address]  [phone]

## 2022-01-07 NOTE — PROGRESS NOTES
0700  Verbal and bedside report from Jessica Blanco RN. PCA pump cleared, settings verified with off going shift nurse Rebeka Ortiz). 0800  Patient in chair, denies complaints. 8013  Dr. Fely Pablo and heart team rounding. Goals of care: pain management, increase activity, start heparin drip and coumadin, transfer to PCU.   0900  Up in chair, napping. 1000  Napping at intervals. Remains up in the chair. 1045  PO norvasc given. IV hydralazine 20 mg given. Cardene drip turned off. Right radial arterial line removed. 1100  PIV x 2 started left upper forearm (# 20) and left wrist (#22)  1145  PT and OT with patient, assisted back to bed. Right IJ MAC removed, hand pressure x 10 minutes, then occlusive dressing applied. 1200  Reassessment completed. Chest tube output 350 ml since 0600. Jaswinder Gao NP notified. Still OK to transfer patient out of CCU to PCU.     1230  TRANSFER - OUT REPORT:    Verbal report given to Kristel Franco RN (name) on Tika Martin  being transferred to PCU room 21  (unit) for routine progression of care       Report consisted of patients Situation, Background, Assessment and   Recommendations(SBAR). Information from the following report(s) SBAR, Kardex, Procedure Summary, Intake/Output, MAR, Accordion, Recent Results and Cardiac Rhythm SR with BBB was reviewed with the receiving nurse.     Lines:   Peripheral IV 01/06/22 Anterior;Proximal;Right Forearm (Active)   Site Assessment Clean, dry, & intact 01/07/22 1200   Phlebitis Assessment 0 01/07/22 1200   Infiltration Assessment 0 01/07/22 1200   Dressing Status Clean, dry, & intact 01/07/22 1200   Dressing Type Transparent;Tape 01/07/22 1200   Hub Color/Line Status Pink;Flushed;Capped 01/07/22 1200   Action Taken Open ports on tubing capped 01/07/22 1200   Alcohol Cap Used Yes 01/07/22 1200       Peripheral IV 01/07/22 Left Antecubital (Active)   Site Assessment Clean, dry, & intact 01/07/22 1200   Phlebitis Assessment 0 01/07/22 1200   Infiltration Assessment 0 01/07/22 1200   Dressing Status Clean, dry, & intact 01/07/22 1200   Dressing Type Transparent;Tape 01/07/22 1200   Hub Color/Line Status Infusing;Pink 01/07/22 1200       Peripheral IV 01/07/22 Anterior; Left Wrist (Active)   Site Assessment Clean, dry, & intact 01/07/22 1200   Phlebitis Assessment 0 01/07/22 1200   Infiltration Assessment 0 01/07/22 1200   Dressing Status Clean, dry, & intact 01/07/22 1200   Dressing Type Transparent;Tape 01/07/22 1200   Hub Color/Line Status Blue;Capped 01/07/22 1200        Opportunity for questions and clarification was provided.       Patient transported with:  Mask   Monitor  O2 @ 4 liters  Patient-specific medications from Pharmacy  Registered Nurse

## 2022-01-07 NOTE — PROGRESS NOTES
Problem: Mobility Impaired (Adult and Pediatric)  Goal: *Acute Goals and Plan of Care (Insert Text)  Description: FUNCTIONAL STATUS PRIOR TO ADMISSION: Patient was independent and active without use of DME.     HOME SUPPORT PRIOR TO ADMISSION: The patient lived with his wife and kids. Physical Therapy Goals  Initiated 1/7/2022  1. Patient will move from supine to sit and sit to supine , scoot up and down, and roll side to side in bed with independence within 5 days. 2.  Patient will perform sit to/from stand with independence within 5 days. 3.  Patient will ambulate 250 feet with least restrictive assistive device and independence within 5 days. 4.  Patient will ascend/descend 2 stairs with one sided handrail(s) with modified independence within 5 days. 5.  Patient will perform cardiac exercises per protocol with modified independence within 5 days. 6.  Patient will verbally recall and functionally demonstrate mindful-based movements (\"move in the tube\") principles without cues within 5 days. Outcome: Not Met   PHYSICAL THERAPY EVALUATION  Patient: Marianne Florentino (89 y.o. male)  Date: 1/7/2022  Primary Diagnosis: Aortic stenosis [I35.0]  Procedure(s) (LRB):  AORTIC VALVE REPLACEMENT (AVR), MECHANICAL VALVE, ECC, DIMITRI AND EPI AORTIC US BY DR Skyler Cano (N/A) 1 Day Post-Op   Precautions:   Sternal (move in the tube)      ASSESSMENT  Based on the objective data described below, the patient presents with decreased strength, decreased functional mobility, impaired balance, unsteady gait, increased pain, and SOB s/p AVR POD 1. Patient received sitting in the chair, ready to return to bed. Patient required CGA for all mobility, min A for bed mobility for LE management. Patient ambulated around the room with CGA and HHA. He required 4L O2 for all mobility, VSS although patient very SOB and labored. Anticipate patient will make good progress with therapy once lines removed.      Patient was educated on understanding of mindful-based movements (\"move in the tube\") principles of keeping UEs proximal to ribcage to prevent lateral pull on the sternum during load-bearing activities with visual and verbal cues required for compliance. Current Level of Function Impacting Discharge (mobility/balance): CGA-min A with HHA     Functional Outcome Measure: The patient scored 45/100 on the Barthel outcome measure which is indicative of moderate functional impairment. Other factors to consider for discharge: pain, SOB, active     Patient will benefit from skilled therapy intervention to address the above noted impairments. PLAN :  Recommendations and Planned Interventions: bed mobility training, transfer training, gait training, therapeutic exercises, patient and family training/education and therapeutic activities      Frequency/Duration: Patient will be followed by physical therapy:  daily to address goals. Recommendation for discharge: (in order for the patient to meet his/her long term goals)  Likely no PT needs    This discharge recommendation:  Has been made in collaboration with the attending provider and/or case management    IF patient discharges home will need the following DME: none         SUBJECTIVE:   Patient stated My buttocks is getting wind to it.     OBJECTIVE DATA SUMMARY:   Patient mobilized on continuous portable monitor/telemetry. HISTORY:    Past Medical History:   Diagnosis Date    Aortic stenosis     Aortic valve stenosis    History reviewed. No pertinent surgical history.     Personal factors and/or comorbidities impacting plan of care: pain, SOB    Home Situation  Home Environment: Private residence  # Steps to Enter: 2  Rails to Enter: Yes  Hand Rails : Bilateral  One/Two Story Residence: One story  Living Alone: No  Support Systems: Spouse/Significant Other,Child(mariluz)  Patient Expects to be Discharged to[de-identified] House  Current DME Used/Available at Home: Grab bars  Tub or Shower Type: Tub/Shower combination    EXAMINATION/PRESENTATION/DECISION MAKING:     Critical Behavior:  Neurologic State: Alert  Orientation Level: Oriented X4  Cognition: Follows commands  Safety/Judgement: Fall prevention  Hearing:     Skin:    Edema:   Range Of Motion:  AROM: Generally decreased, functional           PROM: Generally decreased, functional           Strength:    Strength: Generally decreased, functional                    Tone & Sensation:   Tone: Normal              Sensation: Intact               Coordination:  Coordination: Within functional limits  Vision:      Functional Mobility:  Bed Mobility:  Rolling: Contact guard assistance     Sit to Supine: Minimum assistance  Scooting: Contact guard assistance  Transfers:  Sit to Stand: Contact guard assistance  Stand to Sit: Contact guard assistance        Bed to Chair: Contact guard assistance              Balance:   Sitting: Intact; Without support  Standing: Impaired; Without support  Standing - Static: Good  Standing - Dynamic : Good  Ambulation/Gait Training:  Distance (ft): 20 Feet (ft)  Assistive Device:  (HHA x 1 )  Ambulation - Level of Assistance: Contact guard assistance     Gait Description (WDL): Exceptions to WDL  Gait Abnormalities: Antalgic;Decreased step clearance        Base of Support: Widened     Speed/Any: Pace decreased (<100 feet/min)  Step Length: Right shortened;Left shortened                    Cardiac diagnosis intervention:  Patient instructed and educated on mindful movement principles based on Move in The Tube concept to include maintaining bilateral elbows close to rib cage when performing any load-bearing activity such as getting in/out of bed, pushing up from a chair, opening a door, or lifting a box. Patient was given a handout with diagrams of each correct/incorrect method of performing each of the above tasks.     Therapeutic Exercises:   Patient instructed on the benefits and demonstrated cardiac exercises while sitting with Stand-by assistance. Instructed and indicated understanding on how to progress reps, sets against gravity, pacing through progressive muscle strengthening standing based on surgeon clearance for more weight in prep for functional activity. Instruction on the use of household items in place of weights as needed. CARDIAC   EXERCISE    Sets    Reps    Active  Active Assist    Passive  Self ROM    Comments    Shoulder flexion  1  5   [x]                            []                             []                             []                                Shoulder abduction  1  5  [x]                             []                             []                             []                                Scapular elevation  1  5  [x]                             []                              []                             []                                Scapular retraction  1  5  [x]                             []                             []                             []                                Trunk rotation  1  5  [x]                             []                             []                             []                                Trunk sidebending  1  5  [x]                             []                              []                             []                                          Functional Measure:  Barthel Index:    Bathin  Bladder: 10  Bowels: 10  Groomin  Dressin  Feeding: 10  Mobility: 0  Stairs: 0  Toilet Use: 5  Transfer (Bed to Chair and Back): 10  Total: 45/100       The Barthel ADL Index: Guidelines  1. The index should be used as a record of what a patient does, not as a record of what a patient could do. 2. The main aim is to establish degree of independence from any help, physical or verbal, however minor and for whatever reason. 3. The need for supervision renders the patient not independent.   4. A patient's performance should be established using the best available evidence. Asking the patient, friends/relatives and nurses are the usual sources, but direct observation and common sense are also important. However direct testing is not needed. 5. Usually the patient's performance over the preceding 24-48 hours is important, but occasionally longer periods will be relevant. 6. Middle categories imply that the patient supplies over 50 per cent of the effort. 7. Use of aids to be independent is allowed. Score Interpretation (from [address] [facility] 83)    Independent   60-79 Minimally independent   40-59 Partially dependent   20-39 Very dependent   <20 Totally dependent     -Israel Britton, Barthel, D.W. (1965). Functional evaluation: the Barthel Index. [address] ([address] [place] [address] (1997). The Barthel activities of daily living index: self-reporting versus actual performance in the old (> or = 75 years). Journal of [address] [place] [address] 45(7), [facility], CLAUDINE, Kyara Amato., Marcin Gunderson. (1999). Measuring the change in disability after inpatient rehabilitation; comparison of the responsiveness of the Barthel Index and Functional [place] Measure. Journal of Neurology, Neurosurgery, and Psychiatry, 66(4), 382-510. Sara Red, N.J.A, MARISOL Woo, & Catracho Lei, M.A. (2004) Assessment of post-stroke quality of life in cost-effectiveness studies: The usefulness of the Barthel Index and the EuroQoL-5D.  Quality of Life Research, 15, 896-11            Physical Therapy Evaluation Charge Determination   History Examination Presentation Decision-Making   MEDIUM  Complexity : 1-2 comorbidities / personal factors will impact the outcome/ POC  MEDIUM Complexity : 3 Standardized tests and measures addressing body structure, function, activity limitation and / or participation in recreation  MEDIUM Complexity : Evolving with changing characteristics  Other outcome measures Barthel  MEDIUM Based on the above components, the patient evaluation is determined to be of the following complexity level: MEDIUM        Activity Tolerance:   Fair and requires rest breaks    After treatment patient left in no apparent distress:   Supine in bed, Call bell within reach and Side rails x 3    COMMUNICATION/EDUCATION:   The patients plan of care was discussed with: Occupational therapist, Registered nurse and Case management. Fall prevention education was provided and the patient/caregiver indicated understanding., Patient/family have participated as able in goal setting and plan of care. and Patient/family agree to work toward stated goals and plan of care.     Thank you for this referral.  General Adriaie, PT, DPT   Time Calculation: 23 mins

## 2022-01-08 ENCOUNTER — APPOINTMENT (OUTPATIENT)
Dept: GENERAL RADIOLOGY | Age: 47
DRG: 219 | End: 2022-01-08
Attending: NURSE PRACTITIONER
Payer: COMMERCIAL

## 2022-01-08 LAB
ABO + RH BLD: NORMAL
ADMINISTERED INITIALS, ADMINIT: NORMAL
ANION GAP SERPL CALC-SCNC: 4 MMOL/L (ref 5–15)
APTT PPP: 32.2 SEC (ref 22.1–31)
APTT PPP: 42 SEC (ref 22.1–31)
BLD PROD TYP BPU: NORMAL
BLD PROD TYP BPU: NORMAL
BLOOD GROUP ANTIBODIES SERPL: NORMAL
BPU ID: NORMAL
BPU ID: NORMAL
BUN SERPL-MCNC: 13 MG/DL (ref 6–20)
BUN/CREAT SERPL: 12 (ref 12–20)
CALCIUM SERPL-MCNC: 9 MG/DL (ref 8.5–10.1)
CHLORIDE SERPL-SCNC: 101 MMOL/L (ref 97–108)
CO2 SERPL-SCNC: 28 MMOL/L (ref 21–32)
CREAT SERPL-MCNC: 1.13 MG/DL (ref 0.7–1.3)
CROSSMATCH RESULT,%XM: NORMAL
CROSSMATCH RESULT,%XM: NORMAL
D50 ADMINISTERED, D50ADM: 0 ML
D50 ORDER, D50ORD: 0 ML
ERYTHROCYTE [DISTWIDTH] IN BLOOD BY AUTOMATED COUNT: 12.4 % (ref 11.5–14.5)
GLSCOM COMMENTS: NORMAL
GLUCOSE BLD STRIP.AUTO-MCNC: 100 MG/DL (ref 65–117)
GLUCOSE BLD STRIP.AUTO-MCNC: 100 MG/DL (ref 65–117)
GLUCOSE BLD STRIP.AUTO-MCNC: 104 MG/DL (ref 65–117)
GLUCOSE BLD STRIP.AUTO-MCNC: 111 MG/DL (ref 65–117)
GLUCOSE BLD STRIP.AUTO-MCNC: 112 MG/DL (ref 65–117)
GLUCOSE BLD STRIP.AUTO-MCNC: 113 MG/DL (ref 65–117)
GLUCOSE BLD STRIP.AUTO-MCNC: 115 MG/DL (ref 65–117)
GLUCOSE BLD STRIP.AUTO-MCNC: 115 MG/DL (ref 65–117)
GLUCOSE BLD STRIP.AUTO-MCNC: 120 MG/DL (ref 65–117)
GLUCOSE BLD STRIP.AUTO-MCNC: 149 MG/DL (ref 65–117)
GLUCOSE BLD STRIP.AUTO-MCNC: 88 MG/DL (ref 65–117)
GLUCOSE BLD STRIP.AUTO-MCNC: 98 MG/DL (ref 65–117)
GLUCOSE BLD STRIP.AUTO-MCNC: 98 MG/DL (ref 65–117)
GLUCOSE BLD STRIP.AUTO-MCNC: 99 MG/DL (ref 65–117)
GLUCOSE BLD STRIP.AUTO-MCNC: 99 MG/DL (ref 65–117)
GLUCOSE SERPL-MCNC: 100 MG/DL (ref 65–100)
GLUCOSE, GLC: 100 MG/DL
GLUCOSE, GLC: 100 MG/DL
GLUCOSE, GLC: 104 MG/DL
GLUCOSE, GLC: 111 MG/DL
GLUCOSE, GLC: 112 MG/DL
GLUCOSE, GLC: 113 MG/DL
GLUCOSE, GLC: 115 MG/DL
GLUCOSE, GLC: 120 MG/DL
GLUCOSE, GLC: 149 MG/DL
GLUCOSE, GLC: 88 MG/DL
GLUCOSE, GLC: 98 MG/DL
GLUCOSE, GLC: 98 MG/DL
GLUCOSE, GLC: 99 MG/DL
GLUCOSE, GLC: 99 MG/DL
HCT VFR BLD AUTO: 33.9 % (ref 36.6–50.3)
HGB BLD-MCNC: 11.3 G/DL (ref 12.1–17)
HIGH TARGET, HITG: 130 MG/DL
INR PPP: 1 (ref 0.9–1.1)
INSULIN ADMINSTERED, INSADM: 0.3 UNITS/HOUR
INSULIN ADMINSTERED, INSADM: 0.8 UNITS/HOUR
INSULIN ADMINSTERED, INSADM: 0.9 UNITS/HOUR
INSULIN ADMINSTERED, INSADM: 1 UNITS/HOUR
INSULIN ADMINSTERED, INSADM: 1 UNITS/HOUR
INSULIN ADMINSTERED, INSADM: 1.1 UNITS/HOUR
INSULIN ADMINSTERED, INSADM: 1.1 UNITS/HOUR
INSULIN ADMINSTERED, INSADM: 1.2 UNITS/HOUR
INSULIN ADMINSTERED, INSADM: 1.8 UNITS/HOUR
INSULIN ORDER, INSORD: 0.3 UNITS/HOUR
INSULIN ORDER, INSORD: 0.8 UNITS/HOUR
INSULIN ORDER, INSORD: 0.9 UNITS/HOUR
INSULIN ORDER, INSORD: 1 UNITS/HOUR
INSULIN ORDER, INSORD: 1 UNITS/HOUR
INSULIN ORDER, INSORD: 1.1 UNITS/HOUR
INSULIN ORDER, INSORD: 1.1 UNITS/HOUR
INSULIN ORDER, INSORD: 1.2 UNITS/HOUR
INSULIN ORDER, INSORD: 1.8 UNITS/HOUR
LOW TARGET, LOT: 95 MG/DL
MAGNESIUM SERPL-MCNC: 2.6 MG/DL (ref 1.6–2.4)
MCH RBC QN AUTO: 32.5 PG (ref 26–34)
MCHC RBC AUTO-ENTMCNC: 33.3 G/DL (ref 30–36.5)
MCV RBC AUTO: 97.4 FL (ref 80–99)
MINUTES UNTIL NEXT BG, NBG: 120 MIN
MINUTES UNTIL NEXT BG, NBG: 60 MIN
MULTIPLIER, MUL: 0.01
MULTIPLIER, MUL: 0.02
NRBC # BLD: 0 K/UL (ref 0–0.01)
NRBC BLD-RTO: 0 PER 100 WBC
ORDER INITIALS, ORDINIT: NORMAL
PLATELET # BLD AUTO: 124 K/UL (ref 150–400)
PMV BLD AUTO: 10.6 FL (ref 8.9–12.9)
POTASSIUM SERPL-SCNC: 4.6 MMOL/L (ref 3.5–5.1)
PROTHROMBIN TIME: 10.8 SEC (ref 9–11.1)
RBC # BLD AUTO: 3.48 M/UL (ref 4.1–5.7)
SERVICE CMNT-IMP: ABNORMAL
SERVICE CMNT-IMP: ABNORMAL
SERVICE CMNT-IMP: NORMAL
SODIUM SERPL-SCNC: 133 MMOL/L (ref 136–145)
SPECIMEN EXP DATE BLD: NORMAL
STATUS OF UNIT,%ST: NORMAL
STATUS OF UNIT,%ST: NORMAL
THERAPEUTIC RANGE,PTTT: ABNORMAL SECS (ref 58–77)
THERAPEUTIC RANGE,PTTT: ABNORMAL SECS (ref 58–77)
UNIT DIVISION, %UDIV: 0
UNIT DIVISION, %UDIV: 0
WBC # BLD AUTO: 10.8 K/UL (ref 4.1–11.1)

## 2022-01-08 PROCEDURE — 74011000250 HC RX REV CODE- 250: Performed by: NURSE PRACTITIONER

## 2022-01-08 PROCEDURE — 74011250636 HC RX REV CODE- 250/636: Performed by: THORACIC SURGERY (CARDIOTHORACIC VASCULAR SURGERY)

## 2022-01-08 PROCEDURE — 74011250636 HC RX REV CODE- 250/636: Performed by: NURSE PRACTITIONER

## 2022-01-08 PROCEDURE — 74011250637 HC RX REV CODE- 250/637: Performed by: NURSE PRACTITIONER

## 2022-01-08 PROCEDURE — 97110 THERAPEUTIC EXERCISES: CPT

## 2022-01-08 PROCEDURE — 65660000000 HC RM CCU STEPDOWN

## 2022-01-08 PROCEDURE — 36415 COLL VENOUS BLD VENIPUNCTURE: CPT

## 2022-01-08 PROCEDURE — 85027 COMPLETE CBC AUTOMATED: CPT

## 2022-01-08 PROCEDURE — 83735 ASSAY OF MAGNESIUM: CPT

## 2022-01-08 PROCEDURE — 85610 PROTHROMBIN TIME: CPT

## 2022-01-08 PROCEDURE — 80048 BASIC METABOLIC PNL TOTAL CA: CPT

## 2022-01-08 PROCEDURE — 74011636637 HC RX REV CODE- 636/637: Performed by: NURSE PRACTITIONER

## 2022-01-08 PROCEDURE — 74011000258 HC RX REV CODE- 258: Performed by: NURSE PRACTITIONER

## 2022-01-08 PROCEDURE — 97116 GAIT TRAINING THERAPY: CPT

## 2022-01-08 PROCEDURE — 85730 THROMBOPLASTIN TIME PARTIAL: CPT

## 2022-01-08 PROCEDURE — 71045 X-RAY EXAM CHEST 1 VIEW: CPT

## 2022-01-08 PROCEDURE — 74011250637 HC RX REV CODE- 250/637: Performed by: THORACIC SURGERY (CARDIOTHORACIC VASCULAR SURGERY)

## 2022-01-08 PROCEDURE — 82962 GLUCOSE BLOOD TEST: CPT

## 2022-01-08 PROCEDURE — 77010033678 HC OXYGEN DAILY

## 2022-01-08 RX ORDER — GABAPENTIN 300 MG/1
300 CAPSULE ORAL 3 TIMES DAILY
Status: DISCONTINUED | OUTPATIENT
Start: 2022-01-08 | End: 2022-01-11 | Stop reason: HOSPADM

## 2022-01-08 RX ORDER — HYDROMORPHONE HYDROCHLORIDE 1 MG/ML
1 INJECTION, SOLUTION INTRAMUSCULAR; INTRAVENOUS; SUBCUTANEOUS
Status: DISCONTINUED | OUTPATIENT
Start: 2022-01-08 | End: 2022-01-10

## 2022-01-08 RX ORDER — LIDOCAINE 4 G/100G
1 PATCH TOPICAL EVERY 24 HOURS
Status: DISCONTINUED | OUTPATIENT
Start: 2022-01-08 | End: 2022-01-11 | Stop reason: HOSPADM

## 2022-01-08 RX ORDER — WARFARIN 2.5 MG/1
5 TABLET ORAL ONCE
Status: COMPLETED | OUTPATIENT
Start: 2022-01-08 | End: 2022-01-08

## 2022-01-08 RX ADMIN — ASPIRIN 81 MG CHEWABLE TABLET 81 MG: 81 TABLET CHEWABLE at 09:09

## 2022-01-08 RX ADMIN — OXYCODONE 5 MG: 5 TABLET ORAL at 18:40

## 2022-01-08 RX ADMIN — SODIUM CHLORIDE, PRESERVATIVE FREE 10 ML: 5 INJECTION INTRAVENOUS at 21:51

## 2022-01-08 RX ADMIN — GABAPENTIN 100 MG: 100 CAPSULE ORAL at 09:09

## 2022-01-08 RX ADMIN — CHLORHEXIDINE GLUCONATE 10 ML: 1.2 RINSE ORAL at 09:08

## 2022-01-08 RX ADMIN — HEPARIN SODIUM 12.5 UNITS/KG/HR: 10000 INJECTION, SOLUTION INTRAVENOUS at 18:32

## 2022-01-08 RX ADMIN — SODIUM CHLORIDE, PRESERVATIVE FREE 10 ML: 5 INJECTION INTRAVENOUS at 09:31

## 2022-01-08 RX ADMIN — MELATONIN 6 MG: at 00:29

## 2022-01-08 RX ADMIN — DOCUSATE SODIUM 50MG AND SENNOSIDES 8.6MG 1 TABLET: 8.6; 5 TABLET, FILM COATED ORAL at 18:39

## 2022-01-08 RX ADMIN — WATER 2 G: 1 INJECTION INTRAMUSCULAR; INTRAVENOUS; SUBCUTANEOUS at 00:29

## 2022-01-08 RX ADMIN — Medication 100 MG: at 09:09

## 2022-01-08 RX ADMIN — CHLORHEXIDINE GLUCONATE 10 ML: 1.2 RINSE ORAL at 21:51

## 2022-01-08 RX ADMIN — FOLIC ACID 1 MG: 1 TABLET ORAL at 09:09

## 2022-01-08 RX ADMIN — GABAPENTIN 300 MG: 300 CAPSULE ORAL at 18:39

## 2022-01-08 RX ADMIN — OXYCODONE 5 MG: 5 TABLET ORAL at 13:20

## 2022-01-08 RX ADMIN — DOCUSATE SODIUM 50MG AND SENNOSIDES 8.6MG 1 TABLET: 8.6; 5 TABLET, FILM COATED ORAL at 09:08

## 2022-01-08 RX ADMIN — WARFARIN SODIUM 5 MG: 2.5 TABLET ORAL at 18:39

## 2022-01-08 RX ADMIN — MUPIROCIN: 20 OINTMENT TOPICAL at 21:51

## 2022-01-08 RX ADMIN — HEPARIN SODIUM 2000 UNITS: 1000 INJECTION INTRAVENOUS; SUBCUTANEOUS at 18:29

## 2022-01-08 RX ADMIN — FAMOTIDINE 20 MG: 20 TABLET ORAL at 21:51

## 2022-01-08 RX ADMIN — MELATONIN 6 MG: at 21:54

## 2022-01-08 RX ADMIN — SODIUM CHLORIDE, PRESERVATIVE FREE 10 ML: 5 INJECTION INTRAVENOUS at 12:47

## 2022-01-08 RX ADMIN — OXYCODONE 5 MG: 5 TABLET ORAL at 21:51

## 2022-01-08 RX ADMIN — MORPHINE SULFATE 4 MG: 2 INJECTION, SOLUTION INTRAMUSCULAR; INTRAVENOUS at 00:29

## 2022-01-08 RX ADMIN — SODIUM CHLORIDE, PRESERVATIVE FREE 10 ML: 5 INJECTION INTRAVENOUS at 12:46

## 2022-01-08 RX ADMIN — Medication 400 MG: at 18:39

## 2022-01-08 RX ADMIN — Medication 400 MG: at 09:08

## 2022-01-08 RX ADMIN — CHLORDIAZEPOXIDE HYDROCHLORIDE 25 MG: 25 CAPSULE ORAL at 18:39

## 2022-01-08 RX ADMIN — Medication 0.8 UNITS/HR: at 23:56

## 2022-01-08 RX ADMIN — POLYETHYLENE GLYCOL 3350 17 G: 17 POWDER, FOR SOLUTION ORAL at 09:09

## 2022-01-08 RX ADMIN — FAMOTIDINE 20 MG: 20 TABLET ORAL at 09:09

## 2022-01-08 RX ADMIN — GABAPENTIN 300 MG: 300 CAPSULE ORAL at 21:51

## 2022-01-08 RX ADMIN — MUPIROCIN: 20 OINTMENT TOPICAL at 09:09

## 2022-01-08 RX ADMIN — OXYCODONE 10 MG: 5 TABLET ORAL at 09:08

## 2022-01-08 RX ADMIN — AMLODIPINE BESYLATE 2.5 MG: 5 TABLET ORAL at 11:16

## 2022-01-08 NOTE — PROGRESS NOTES
CSS Progress Note    Admit Date: 2022  POD:  2 Day Post-Op    Procedure:  Procedure(s):  AORTIC VALVE REPLACEMENT (AVR), MECHANICAL VALVE, ECC, DIMITRI AND EPI AORTIC US BY DR Tolu Martinez        Subjective:   Pt seen with Dr. Katie Poon. Afebrile, on 4L NC. On insulin. Up in chair. No complaints. NSR LBBB     Objective:   Vitals:  Blood pressure 125/69, pulse 82, temperature 97.7 °F (36.5 °C), resp. rate 20, weight 219 lb 5.7 oz (99.5 kg), SpO2 100 %. Temp (24hrs), Av.1 °F (36.7 °C), Min:97.7 °F (36.5 °C), Max:98.8 °F (37.1 °C)    EKG/Rhythm:  NSR LBBB      CT Output: 625 ml/24 hrs      Oxygen Therapy:  Oxygen Therapy  O2 Sat (%): 100 % (22 07)  Pulse via Oximetry: 75 beats per minute (22)  O2 Device: Nasal cannula (22 035)  Skin Assessment: Clean, dry, & intact (22 035)  O2 Flow Rate (L/min): 4 l/min (22 035)  FIO2 (%): 50 % (22 1500)    CXR:   CXR Results  (Last 48 hours)               22 0526  XR CHEST PORT Final result    Impression:  Trace bilateral pleural effusions. Mild right basilar airspace   opacity likely atelectasis       Narrative:  EXAM: XR CHEST PORT       INDICATION: postop heart       COMPARISON: 2022       FINDINGS: A portable AP radiograph of the chest was obtained at 519 hours. Median sternotomy changes are noted. . Small bilateral pleural effusions are   again identified unchanged. . Median sternotomy changes are again noted. Right IJ   [place]-Christian catheter has been removed. .. The bones and soft tissues are grossly   within normal limits. Mild right basilar atelectasis. 22 0541  XR CHEST PORT Final result    Impression:  Tubes and lines, as described above. Mild left basilar atelectasis. Narrative:  INDICATION: Postop heart. Portable AP semiupright view of the chest.       Direct comparison made to prior chest x-ray dated 2022. Cardiomediastinal silhouette is stable.  Median sternotomy wires are noted. There   has been interval extubation and interval removal of nasogastric tube. [place]-Christian   catheter tip overlies the main pulmonary outflow tract. There is no   pneumothorax. There is mild left basilar atelectasis. No pleural fluid is   visualized on this single AP view. Right lung is grossly clear. 01/06/22 1309  XR CHEST PORT Final result    Impression:  1. Interval placement of an ET tube which terminates approximately 2 cm above   the clavicular heads, consider advancement. 2. Right IJ approach catheter and gastric tube as above. Narrative:  INDICATION: Postop, upon arrival. postop heart   Additional history:   COMPARISON: Previous chest xray, 12/29/2020. LIMITATIONS: Portable technique. Karol Dominguez FINDINGS: Single frontal view of the chest.    .   Lines/tubes/surgical: An ET tube projects over the airway and terminates   approximately 2 cm above the clavicular heads. A right IJ approach [place]-Christian   catheter is indistinct at its termination, likely in the pulmonary outflow   tract. A gastric tube traverses the expected course of the esophagus, extends   into the left upper quadrant and terminates below the image Query   mediastinal/chest drain. Heart/mediastinum: Status post median sternotomy. Lungs/pleura: Low lung volumes without definite consolidation. No visible   pneumothorax. Additional Comments: None.     .             Admission Weight: Last Weight   Weight: 209 lb 7 oz (95 kg) Weight: 219 lb 5.7 oz (99.5 kg)     Intake / Output / Drain:  Current Shift: 01/08 0701 - 01/08 1900  In: 60 [P.O.:60]  Out: -   Last 24 hrs.:     Intake/Output Summary (Last 24 hours) at 1/8/2022 1015  Last data filed at 1/8/2022 0902  Gross per 24 hour   Intake 594.53 ml   Output 3250 ml   Net -2655.47 ml       EXAM:  General:   Alert, NAD                                                                                           Lungs:   Clear upper, diminished bases to auscultation bilaterally. Incision:  dsg cdi   Heart:  Regular rate and rhythm, S1, S2 normal, no murmur, click, rub or gallop. Abdomen:   Soft, non-tender. Bowel sounds hypoactive. No masses,  No organomegaly. Extremities:  No edema. PPP. Neurologic:  Gross motor and sensory apparatus intact. Labs:   Recent Labs     22  0923 22  0444 22  0442   WBC  --   --  10.8   HGB  --   --  11.3*   HCT  --   --  33.9*   PLT  --   --  124*   NA  --   --  133*   K  --   --  4.6   BUN  --   --  13   CREA  --   --  1.13   GLU  --   --  100   GLUCPOC 100   < >  --    INR  --   --  1.0    < > = values in this interval not displayed. 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 AS s/p AVR mechanical (on-x valve): On ASA, start heparin gtt, cont coumadin tonight. INR goal 2-3 for 3 months and 1.5-2 thereafter. Pharmacy to dose coumadin this weekend. Keep CT until INR closer to goal.     2. HTN: cont norvasc, PRN hydralazine. 3. New LBBB: initially pacer dependent, now NSR with LBBB, monitor rhythm, has pacing wires. 4. Atelectasis: Encourage I/S, wean O2 for sats > 92%    5. Post op anemia st acute blood loss: Monitor H&H and CT output    6. Leukocytosis: Mild, afebrile, monitor    7. Thrombocytopenia: Mild, trend    8. Alcohol use: Pt drinks beer daily (more then 3-6 per day per patient report). Cont thiamine and folic acid, librium qhs, prn valium for CIWA protocol. 9. Pain control: Cont PO oxycodone and tylenol. Increase gabapentin. Start lidocaine patchesPRN dilauded for breathrough pain only    9. Dispo: PT/OT, Working towards home with [place] likely early next week.      Signed By: Evaline Epley, NP

## 2022-01-08 NOTE — PROGRESS NOTES
Pharmacy - Heparin Monitoring    Indication:  s/p AVR    Goal aPTT: 58-77 seconds    Initial dosing Weight: 95 kg    Labs:  Recent Labs     01/08/22  0442 01/07/22  1445 01/07/22  1445 01/07/22  1405 01/07/22  1014   APTT 32.2*  --  34.2*  --  26.5   HGB 11.3*   < > 11.9*   < >  --    *   < > 153   < >  --    INR 1.0  --   --   --   --     < > = values in this interval not displayed. Current rate:  0 unit/kg/hr - initial rate    Impression/Plan:   Heparin drip was stopped yesterday by CS team d/t sanguinous output from CT. Spoke with Dom Agrawal RN this morning and team wishes to restart. New rate: 10.5 unit/kg/hr and aPTT in 6 hours  PRN bolus dose (Yes - Include Dose or No): Infusion rate, PRN bolus dose and aPTT results were discussed with the nurse: (Yes/No): yes     Thanks,  John Membreno PHARMD    [email or web address]/[email or web address]/[email or web address]/[email or web address]. pdf

## 2022-01-08 NOTE — PROGRESS NOTES
4972:  Pt called out because he was still in pain and wanted to get out of bed to chair. Pt stated that tyenolol  was not helpful for this pain relief. Morphine given, as well as melatonin, due to not being able to not fall asleep. 0050: Pt is sleeping well.  Without pain

## 2022-01-08 NOTE — PROGRESS NOTES
Problem: Mobility Impaired (Adult and Pediatric)  Goal: *Acute Goals and Plan of Care (Insert Text)  Description: FUNCTIONAL STATUS PRIOR TO ADMISSION: Patient was independent and active without use of DME.     HOME SUPPORT PRIOR TO ADMISSION: The patient lived with his wife and kids. Physical Therapy Goals  Initiated 1/7/2022  1. Patient will move from supine to sit and sit to supine , scoot up and down, and roll side to side in bed with independence within 5 days. 2.  Patient will perform sit to/from stand with independence within 5 days. 3.  Patient will ambulate 250 feet with least restrictive assistive device and independence within 5 days. 4.  Patient will ascend/descend 2 stairs with one sided handrail(s) with modified independence within 5 days. 5.  Patient will perform cardiac exercises per protocol with modified independence within 5 days. 6.  Patient will verbally recall and functionally demonstrate mindful-based movements (\"move in the tube\") principles without cues within 5 days. Outcome: Progressing Towards Goal   PHYSICAL THERAPY TREATMENT  Patient: Baron Quinn (31 y.o. male)  Date: 1/8/2022  Diagnosis: Aortic stenosis [I35.0] <principal problem not specified>  Procedure(s) (LRB):  AORTIC VALVE REPLACEMENT (AVR), MECHANICAL VALVE, ECC, DIMITRI AND EPI AORTIC US BY DR Pavan Salgado (N/A) 2 Days Post-Op  Precautions: Sternal (move in the tube)  Chart, physical therapy assessment, plan of care and goals were reviewed. ASSESSMENT  Patient continues with skilled PT services and is progressing towards goals. Pt present with decreased activity tolerance, generalized weakness, increased pain levels, impaired balance, and below his level of baseline. Pt received on 4L NC with O2 saturation at 99% prior to therapy. NC removed and pt able to perform cardiac exercises with vitals holding stable on RA. Pt moving well and transfers sit<>stand CGA with adherence to sternal precautions.  Pt able to ambulate 150ft CGA w/HHA on RA and noted with increased trunk sway with ambulating. Pt vitals stable with ambulation on RA and nursing aware and left on RA at end of session. Pt left with all needs met and nursing present. Patient is demonstrating understanding of mindful-based movements (\"move in the tube\") principles of keeping UEs proximal to ribcage to prevent lateral pull on the sternum during load-bearing activities with verbal cues required for compliance. Current Level of Function Impacting Discharge (mobility/balance): CGA transfers sit<>stand, CGA HHA w/gait    Other factors to consider for discharge: fall risk, active @ baseline, below baseline, increased pain         PLAN :  Patient continues to benefit from skilled intervention to address the above impairments. Continue treatment per established plan of care. to address goals. Recommendation for discharge: (in order for the patient to meet his/her long term goals)  No skilled physical therapy/ follow up rehabilitation needs identified at this time. This discharge recommendation:  Has been made in collaboration with the attending provider and/or case management    IF patient discharges home will need the following DME: none       SUBJECTIVE:   Patient stated it just hurts breathing with this tube here (chest tube).     OBJECTIVE DATA SUMMARY:   Patient mobilized on continuous portable monitor/telemetry.   Critical Behavior:  Neurologic State: Alert  Orientation Level: Oriented X4  Cognition: Follows commands  Safety/Judgement: Fall prevention    Functional Mobility Training:  Bed Mobility:  Pt seated in chair upon arrival    Transfers:  Sit to Stand: Contact guard assistance  Stand to Sit: Contact guard assistance    Balance:  Sitting: Intact  Standing: Impaired  Standing - Static: Good  Standing - Dynamic : Fair    Ambulation/Gait Training:  Distance (ft): 150 Feet (ft)  Assistive Device: Gait belt (HHA)  Ambulation - Level of Assistance: Contact guard assistance    Gait Abnormalities: Altered arm swing;Decreased step clearance     Base of Support: Widened     Speed/Any: Pace decreased (<100 feet/min)  Step Length: Left shortened;Right shortened    Cardiac diagnosis intervention:  Patient instructed and educated on mindful movement principles based on Move in The Tube concept to include maintaining bilateral elbows close to rib cage when performing any load-bearing activity such as getting in/out of bed, pushing up from a chair, opening a door, or lifting a box. Patient was given a handout with diagrams of each correct/incorrect method of performing each of the above tasks. Therapeutic Exercises:   Patient instructed on the benefits and demonstrated cardiac exercises while seated with Minimum assistance. Instructed and indicated understanding on how to progress reps, sets against gravity, pacing through progressive muscle strengthening standing based on surgeon clearance for more weight in prep for functional activity. Instruction on the use of household items in place of weights as needed.      CARDIAC  EXERCISE   Sets   Reps   Active Active Assist   Passive Self ROM   Comments   Shoulder flexion 1 10 [x]                                            []                                            []                                            []                                            Michelle   Shoulder abduction 1      10 [x]                                            []                                            []                                            []                                            Michelle   Scapular elevation 1 10 [x]                                            []                                            []                                            []                                               Scapular retraction 1 10 [x]                                            []                                            [] []                                               Trunk rotation 1 10 [x]                                            []                                            []                                            [x]                                               Trunk sidebending 1 10 [x]                                            []                                            []                                            []                                                  []                                            []                                            []                                            []                                                   Pain Rating:  No pain rating received, however c/o of pain around chest tube    Activity Tolerance:   Fair    After treatment patient left in no apparent distress:   Sitting in chair and Call bell within reach    COMMUNICATION/COLLABORATION:   The patients plan of care was discussed with: Physical therapist and Registered nurse.      Suzanne Walker PTA   Time Calculation: 33 mins

## 2022-01-08 NOTE — PROGRESS NOTES
Pharmacist Daily Dosing of Warfarin    Indication & Goal INR: Mechanical Valve, INR Goal 2-3 for three months and then down to 1.5 - 2 (On-X valve)    PTA Warfarin Dose: new start    Notable concurrent conditions and medications: Amiodarone    Labs:  Recent Labs     01/08/22  0442 01/07/22  1445 01/07/22  1445 01/07/22  1405 01/07/22  1405 01/07/22  0418 01/07/22  0418 01/06/22  1716 01/06/22  1306 01/06/22  1306   INR 1.0  --   --   --   --   --   --   --   --  1.1   HGB 11.3*   < > 11.9*   < > 12.0*   < > 10.9* 12.8   < > 12.2   *  --  153  --  140*   < > 128*  --    < > 134*   TBILI  --   --   --   --   --   --  0.6 0.8  --  0.4   ALB  --   --   --   --   --   --  3.8 3.7   < > 3.2*    < > = values in this interval not displayed. Impression/Plan:   Will order warfarin 5 mg PO x 1 dose. Daily INR has been ordered  CBC w/o differential every other day has been ordered     Pharmacy will follow daily and adjust the dose as appropriate.     Thank you,  Chanelle Koroma PHARMD      Warfarin Protocol    Located on pharmacy Teams site: Clinical Practice -> Anticoagulation & Cardiology -> Anticoagulation Policies, Protocols, Guidance

## 2022-01-08 NOTE — PROGRESS NOTES
Per Stephanie Ramos NP, heparin gtt is to be restarted. RN confirmed with pharmacist Shlya Higuera to restart gtt at 10.5 units/kg/hr and recheck PTT in 6 hrs.

## 2022-01-09 ENCOUNTER — APPOINTMENT (OUTPATIENT)
Dept: GENERAL RADIOLOGY | Age: 47
DRG: 219 | End: 2022-01-09
Attending: NURSE PRACTITIONER
Payer: COMMERCIAL

## 2022-01-09 LAB
ADMINISTERED INITIALS, ADMINIT: NORMAL
ANION GAP SERPL CALC-SCNC: 6 MMOL/L (ref 5–15)
APTT PPP: 33.2 SEC (ref 22.1–31)
APTT PPP: 35.6 SEC (ref 22.1–31)
APTT PPP: 36.4 SEC (ref 22.1–31)
APTT PPP: 37.7 SEC (ref 22.1–31)
APTT PPP: 85.4 SEC (ref 22.1–31)
BUN SERPL-MCNC: 11 MG/DL (ref 6–20)
BUN/CREAT SERPL: 11 (ref 12–20)
CALCIUM SERPL-MCNC: 9.3 MG/DL (ref 8.5–10.1)
CHLORIDE SERPL-SCNC: 100 MMOL/L (ref 97–108)
CO2 SERPL-SCNC: 29 MMOL/L (ref 21–32)
CREAT SERPL-MCNC: 0.99 MG/DL (ref 0.7–1.3)
D50 ADMINISTERED, D50ADM: 0 ML
D50 ORDER, D50ORD: 0 ML
ERYTHROCYTE [DISTWIDTH] IN BLOOD BY AUTOMATED COUNT: 12 % (ref 11.5–14.5)
GLSCOM COMMENTS: NORMAL
GLUCOSE BLD STRIP.AUTO-MCNC: 100 MG/DL (ref 65–117)
GLUCOSE BLD STRIP.AUTO-MCNC: 101 MG/DL (ref 65–117)
GLUCOSE BLD STRIP.AUTO-MCNC: 102 MG/DL (ref 65–117)
GLUCOSE BLD STRIP.AUTO-MCNC: 102 MG/DL (ref 65–117)
GLUCOSE BLD STRIP.AUTO-MCNC: 105 MG/DL (ref 65–117)
GLUCOSE BLD STRIP.AUTO-MCNC: 105 MG/DL (ref 65–117)
GLUCOSE BLD STRIP.AUTO-MCNC: 106 MG/DL (ref 65–117)
GLUCOSE BLD STRIP.AUTO-MCNC: 106 MG/DL (ref 65–117)
GLUCOSE BLD STRIP.AUTO-MCNC: 110 MG/DL (ref 65–117)
GLUCOSE BLD STRIP.AUTO-MCNC: 137 MG/DL (ref 65–117)
GLUCOSE BLD STRIP.AUTO-MCNC: 152 MG/DL (ref 65–117)
GLUCOSE BLD STRIP.AUTO-MCNC: 89 MG/DL (ref 65–117)
GLUCOSE BLD STRIP.AUTO-MCNC: 91 MG/DL (ref 65–117)
GLUCOSE BLD STRIP.AUTO-MCNC: 96 MG/DL (ref 65–117)
GLUCOSE BLD STRIP.AUTO-MCNC: 97 MG/DL (ref 65–117)
GLUCOSE BLD STRIP.AUTO-MCNC: 99 MG/DL (ref 65–117)
GLUCOSE BLD STRIP.AUTO-MCNC: 99 MG/DL (ref 65–117)
GLUCOSE SERPL-MCNC: 98 MG/DL (ref 65–100)
GLUCOSE, GLC: 100 MG/DL
GLUCOSE, GLC: 101 MG/DL
GLUCOSE, GLC: 105 MG/DL
GLUCOSE, GLC: 105 MG/DL
GLUCOSE, GLC: 106 MG/DL
GLUCOSE, GLC: 106 MG/DL
GLUCOSE, GLC: 110 MG/DL
GLUCOSE, GLC: 137 MG/DL
GLUCOSE, GLC: 152 MG/DL
GLUCOSE, GLC: 89 MG/DL
GLUCOSE, GLC: 91 MG/DL
GLUCOSE, GLC: 96 MG/DL
GLUCOSE, GLC: 97 MG/DL
GLUCOSE, GLC: 99 MG/DL
GLUCOSE, GLC: 99 MG/DL
HCT VFR BLD AUTO: 32.4 % (ref 36.6–50.3)
HGB BLD-MCNC: 11.1 G/DL (ref 12.1–17)
HIGH TARGET, HITG: 130 MG/DL
INR PPP: 1 (ref 0.9–1.1)
INSULIN ADMINSTERED, INSADM: 0.3 UNITS/HOUR
INSULIN ADMINSTERED, INSADM: 0.3 UNITS/HOUR
INSULIN ADMINSTERED, INSADM: 0.4 UNITS/HOUR
INSULIN ADMINSTERED, INSADM: 0.4 UNITS/HOUR
INSULIN ADMINSTERED, INSADM: 0.7 UNITS/HOUR
INSULIN ADMINSTERED, INSADM: 0.7 UNITS/HOUR
INSULIN ADMINSTERED, INSADM: 0.8 UNITS/HOUR
INSULIN ADMINSTERED, INSADM: 0.8 UNITS/HOUR
INSULIN ADMINSTERED, INSADM: 0.9 UNITS/HOUR
INSULIN ADMINSTERED, INSADM: 1 UNITS/HOUR
INSULIN ADMINSTERED, INSADM: 1.5 UNITS/HOUR
INSULIN ADMINSTERED, INSADM: 1.8 UNITS/HOUR
INSULIN ORDER, INSORD: 0.3 UNITS/HOUR
INSULIN ORDER, INSORD: 0.3 UNITS/HOUR
INSULIN ORDER, INSORD: 0.4 UNITS/HOUR
INSULIN ORDER, INSORD: 0.4 UNITS/HOUR
INSULIN ORDER, INSORD: 0.7 UNITS/HOUR
INSULIN ORDER, INSORD: 0.7 UNITS/HOUR
INSULIN ORDER, INSORD: 0.8 UNITS/HOUR
INSULIN ORDER, INSORD: 0.8 UNITS/HOUR
INSULIN ORDER, INSORD: 0.9 UNITS/HOUR
INSULIN ORDER, INSORD: 1 UNITS/HOUR
INSULIN ORDER, INSORD: 1.5 UNITS/HOUR
INSULIN ORDER, INSORD: 1.8 UNITS/HOUR
LOW TARGET, LOT: 95 MG/DL
MAGNESIUM SERPL-MCNC: 2.4 MG/DL (ref 1.6–2.4)
MCH RBC QN AUTO: 33.1 PG (ref 26–34)
MCHC RBC AUTO-ENTMCNC: 34.3 G/DL (ref 30–36.5)
MCV RBC AUTO: 96.7 FL (ref 80–99)
MINUTES UNTIL NEXT BG, NBG: 120 MIN
MINUTES UNTIL NEXT BG, NBG: 60 MIN
MULTIPLIER, MUL: 0.01
MULTIPLIER, MUL: 0.02
NRBC # BLD: 0 K/UL (ref 0–0.01)
NRBC BLD-RTO: 0 PER 100 WBC
ORDER INITIALS, ORDINIT: NORMAL
PLATELET # BLD AUTO: 141 K/UL (ref 150–400)
PMV BLD AUTO: 11 FL (ref 8.9–12.9)
POTASSIUM SERPL-SCNC: 3.9 MMOL/L (ref 3.5–5.1)
PROTHROMBIN TIME: 10.9 SEC (ref 9–11.1)
RBC # BLD AUTO: 3.35 M/UL (ref 4.1–5.7)
SERVICE CMNT-IMP: ABNORMAL
SERVICE CMNT-IMP: ABNORMAL
SERVICE CMNT-IMP: NORMAL
SODIUM SERPL-SCNC: 135 MMOL/L (ref 136–145)
THERAPEUTIC RANGE,PTTT: ABNORMAL SECS (ref 58–77)
WBC # BLD AUTO: 9.1 K/UL (ref 4.1–11.1)

## 2022-01-09 PROCEDURE — 85730 THROMBOPLASTIN TIME PARTIAL: CPT

## 2022-01-09 PROCEDURE — 74011000250 HC RX REV CODE- 250: Performed by: NURSE PRACTITIONER

## 2022-01-09 PROCEDURE — 74011250637 HC RX REV CODE- 250/637: Performed by: NURSE PRACTITIONER

## 2022-01-09 PROCEDURE — 74011636637 HC RX REV CODE- 636/637: Performed by: NURSE PRACTITIONER

## 2022-01-09 PROCEDURE — 74011250636 HC RX REV CODE- 250/636: Performed by: THORACIC SURGERY (CARDIOTHORACIC VASCULAR SURGERY)

## 2022-01-09 PROCEDURE — 74011250637 HC RX REV CODE- 250/637: Performed by: THORACIC SURGERY (CARDIOTHORACIC VASCULAR SURGERY)

## 2022-01-09 PROCEDURE — 36415 COLL VENOUS BLD VENIPUNCTURE: CPT

## 2022-01-09 PROCEDURE — 85027 COMPLETE CBC AUTOMATED: CPT

## 2022-01-09 PROCEDURE — 97110 THERAPEUTIC EXERCISES: CPT

## 2022-01-09 PROCEDURE — 74011636637 HC RX REV CODE- 636/637: Performed by: THORACIC SURGERY (CARDIOTHORACIC VASCULAR SURGERY)

## 2022-01-09 PROCEDURE — 97116 GAIT TRAINING THERAPY: CPT

## 2022-01-09 PROCEDURE — 82962 GLUCOSE BLOOD TEST: CPT

## 2022-01-09 PROCEDURE — 80048 BASIC METABOLIC PNL TOTAL CA: CPT

## 2022-01-09 PROCEDURE — 71045 X-RAY EXAM CHEST 1 VIEW: CPT

## 2022-01-09 PROCEDURE — 83735 ASSAY OF MAGNESIUM: CPT

## 2022-01-09 PROCEDURE — 85610 PROTHROMBIN TIME: CPT

## 2022-01-09 PROCEDURE — 65660000000 HC RM CCU STEPDOWN

## 2022-01-09 PROCEDURE — 74011000258 HC RX REV CODE- 258: Performed by: NURSE PRACTITIONER

## 2022-01-09 PROCEDURE — 74011250636 HC RX REV CODE- 250/636: Performed by: NURSE PRACTITIONER

## 2022-01-09 RX ORDER — METOPROLOL TARTRATE 25 MG/1
25 TABLET, FILM COATED ORAL EVERY 12 HOURS
Status: DISCONTINUED | OUTPATIENT
Start: 2022-01-09 | End: 2022-01-10

## 2022-01-09 RX ORDER — INSULIN GLARGINE 100 [IU]/ML
1-50 INJECTION, SOLUTION SUBCUTANEOUS
Status: DISCONTINUED | OUTPATIENT
Start: 2022-01-09 | End: 2022-01-10

## 2022-01-09 RX ORDER — INSULIN GLARGINE 100 [IU]/ML
5 INJECTION, SOLUTION SUBCUTANEOUS ONCE
Status: COMPLETED | OUTPATIENT
Start: 2022-01-09 | End: 2022-01-09

## 2022-01-09 RX ORDER — WARFARIN 2.5 MG/1
7.5 TABLET ORAL ONCE
Status: COMPLETED | OUTPATIENT
Start: 2022-01-09 | End: 2022-01-09

## 2022-01-09 RX ADMIN — SODIUM CHLORIDE, PRESERVATIVE FREE 10 ML: 5 INJECTION INTRAVENOUS at 13:05

## 2022-01-09 RX ADMIN — MELATONIN 6 MG: at 22:15

## 2022-01-09 RX ADMIN — Medication 0.4 UNITS/HR: at 11:56

## 2022-01-09 RX ADMIN — FAMOTIDINE 20 MG: 20 TABLET ORAL at 22:15

## 2022-01-09 RX ADMIN — INSULIN GLARGINE 5 UNITS: 100 INJECTION, SOLUTION SUBCUTANEOUS at 17:59

## 2022-01-09 RX ADMIN — OXYCODONE 5 MG: 5 TABLET ORAL at 11:55

## 2022-01-09 RX ADMIN — DOCUSATE SODIUM 50MG AND SENNOSIDES 8.6MG 1 TABLET: 8.6; 5 TABLET, FILM COATED ORAL at 08:24

## 2022-01-09 RX ADMIN — GABAPENTIN 300 MG: 300 CAPSULE ORAL at 22:15

## 2022-01-09 RX ADMIN — DOCUSATE SODIUM 50MG AND SENNOSIDES 8.6MG 1 TABLET: 8.6; 5 TABLET, FILM COATED ORAL at 17:01

## 2022-01-09 RX ADMIN — OXYCODONE 5 MG: 5 TABLET ORAL at 16:21

## 2022-01-09 RX ADMIN — OXYCODONE 5 MG: 5 TABLET ORAL at 22:14

## 2022-01-09 RX ADMIN — FOLIC ACID 1 MG: 1 TABLET ORAL at 08:24

## 2022-01-09 RX ADMIN — MUPIROCIN: 20 OINTMENT TOPICAL at 08:25

## 2022-01-09 RX ADMIN — Medication 100 MG: at 08:24

## 2022-01-09 RX ADMIN — OXYCODONE 5 MG: 5 TABLET ORAL at 07:33

## 2022-01-09 RX ADMIN — FAMOTIDINE 20 MG: 20 TABLET ORAL at 08:24

## 2022-01-09 RX ADMIN — POLYETHYLENE GLYCOL 3350 17 G: 17 POWDER, FOR SOLUTION ORAL at 08:24

## 2022-01-09 RX ADMIN — HEPARIN SODIUM 4000 UNITS: 1000 INJECTION INTRAVENOUS; SUBCUTANEOUS at 16:46

## 2022-01-09 RX ADMIN — GABAPENTIN 300 MG: 300 CAPSULE ORAL at 08:24

## 2022-01-09 RX ADMIN — METOPROLOL TARTRATE 25 MG: 25 TABLET, FILM COATED ORAL at 22:15

## 2022-01-09 RX ADMIN — ASPIRIN 81 MG CHEWABLE TABLET 81 MG: 81 TABLET CHEWABLE at 08:24

## 2022-01-09 RX ADMIN — HEPARIN SODIUM 10.5 UNITS/KG/HR: 10000 INJECTION, SOLUTION INTRAVENOUS at 03:24

## 2022-01-09 RX ADMIN — CHLORHEXIDINE GLUCONATE 10 ML: 1.2 RINSE ORAL at 22:14

## 2022-01-09 RX ADMIN — SODIUM CHLORIDE, PRESERVATIVE FREE 10 ML: 5 INJECTION INTRAVENOUS at 22:15

## 2022-01-09 RX ADMIN — HEPARIN SODIUM 4000 UNITS: 1000 INJECTION INTRAVENOUS; SUBCUTANEOUS at 09:24

## 2022-01-09 RX ADMIN — GABAPENTIN 300 MG: 300 CAPSULE ORAL at 16:54

## 2022-01-09 RX ADMIN — METOPROLOL TARTRATE 25 MG: 25 TABLET, FILM COATED ORAL at 11:55

## 2022-01-09 RX ADMIN — Medication 400 MG: at 08:24

## 2022-01-09 RX ADMIN — Medication 400 MG: at 17:01

## 2022-01-09 RX ADMIN — CHLORHEXIDINE GLUCONATE 10 ML: 1.2 RINSE ORAL at 08:25

## 2022-01-09 RX ADMIN — SODIUM CHLORIDE, PRESERVATIVE FREE 10 ML: 5 INJECTION INTRAVENOUS at 07:18

## 2022-01-09 RX ADMIN — MUPIROCIN: 20 OINTMENT TOPICAL at 22:15

## 2022-01-09 RX ADMIN — WARFARIN SODIUM 7.5 MG: 2.5 TABLET ORAL at 17:01

## 2022-01-09 RX ADMIN — CHLORDIAZEPOXIDE HYDROCHLORIDE 25 MG: 25 CAPSULE ORAL at 17:01

## 2022-01-09 NOTE — PROGRESS NOTES
RN verified heparin gtt with pharmacist Shawnee Laser. Pt's PTT was 35. 6. heparin gtt currently infusing at 10.5 units/kg/hr. Rate changes to 14.5 units/kg/hr after 4,000 unit bolus given.

## 2022-01-09 NOTE — PROGRESS NOTES
Problem: Falls - Risk of  Goal: *Absence of Falls  Description: Document Elvia Skill Fall Risk and appropriate interventions in the flowsheet. Outcome: Progressing Towards Goal  Note: Fall Risk Interventions:  Mobility Interventions: Communicate number of staff needed for ambulation/transfer,OT consult for ADLs,Patient to call before getting OOB,PT Consult for mobility concerns,PT Consult for assist device competence    Mentation Interventions: Adequate sleep, hydration, pain control,Bed/chair exit alarm,More frequent rounding    Medication Interventions: Evaluate medications/consider consulting pharmacy,Patient to call before getting OOB,Teach patient to arise slowly    Elimination Interventions: Call light in reach,Patient to call for help with toileting needs,Toileting schedule/hourly rounds,Urinal in reach    History of Falls Interventions: Bed/chair exit alarm,Vital signs minimum Q4HRs X 24 hrs (comment for end date)         Problem: Pressure Injury - Risk of  Goal: *Prevention of pressure injury  Description: Document Delmar Scale and appropriate interventions in the flowsheet. Outcome: Progressing Towards Goal  Note: Pressure Injury Interventions:  Sensory Interventions: Assess changes in LOC,Assess need for specialty bed,Turn and reposition approx.  every two hours (pillows and wedges if needed)    Moisture Interventions: Absorbent underpads,Apply protective barrier, creams and emollients,Check for incontinence Q2 hours and as needed    Activity Interventions: Chair cushion,Increase time out of bed,Pressure redistribution bed/mattress(bed type),PT/OT evaluation    Mobility Interventions: Chair cushion,HOB 30 degrees or less,Pressure redistribution bed/mattress (bed type),PT/OT evaluation    Nutrition Interventions: Document food/fluid/supplement intake,Offer support with meals,snacks and hydration,Discuss nutritional consult with provider    Friction and Shear Interventions: HOB 30 degrees or less,Lift sheet,Minimize layers

## 2022-01-09 NOTE — PROGRESS NOTES
Problem: Mobility Impaired (Adult and Pediatric)  Goal: *Acute Goals and Plan of Care (Insert Text)  Description: FUNCTIONAL STATUS PRIOR TO ADMISSION: Patient was independent and active without use of DME.     HOME SUPPORT PRIOR TO ADMISSION: The patient lived with his wife and kids. Physical Therapy Goals  Initiated 1/7/2022  1. Patient will move from supine to sit and sit to supine , scoot up and down, and roll side to side in bed with independence within 5 days. 2.  Patient will perform sit to/from stand with independence within 5 days. 3.  Patient will ambulate 250 feet with least restrictive assistive device and independence within 5 days. 4.  Patient will ascend/descend 2 stairs with one sided handrail(s) with modified independence within 5 days. 5.  Patient will perform cardiac exercises per protocol with modified independence within 5 days. 6.  Patient will verbally recall and functionally demonstrate mindful-based movements (\"move in the tube\") principles without cues within 5 days. Outcome: Progressing Towards Goal     PHYSICAL THERAPY TREATMENT  Patient: Claire Guadarrama (69 y.o. male)  Date: 1/9/2022  Diagnosis: Aortic stenosis [I35.0] <principal problem not specified>  Procedure(s) (LRB):  AORTIC VALVE REPLACEMENT (AVR), MECHANICAL VALVE, ECC, DIMITRI AND EPI AORTIC US BY DR Kendrick Rodriguez (N/A) 3 Days Post-Op  Precautions: Sternal (move in the tube)  Chart, physical therapy assessment, plan of care and goals were reviewed. ASSESSMENT  Patient continues with skilled PT services and is progressing towards goals. Pt was received sitting up in the chair on room air and cleared by nursing to mobilize. VSS during session. He was able to stand and ambulated the entire benites without AD or loss of balance. Returned to the room and performed all cardiac exercises. Next session would benefit from stair training and performing cardiac exercises in standing. Progressing well.     Patient is demonstrating understanding of mindful-based movements (\"move in the tube\") principles of keeping UEs proximal to ribcage to prevent lateral pull on the sternum during load-bearing activities with verbal cues required for compliance. Current Level of Function Impacting Discharge (mobility/balance): CGA/SBA    Other factors to consider for discharge:          PLAN :  Patient continues to benefit from skilled intervention to address the above impairments. Continue treatment per established plan of care. to address goals. Recommendation for discharge: (in order for the patient to meet his/her long term goals)  No skilled physical therapy/ follow up rehabilitation needs identified at this time. This discharge recommendation:  Has not yet been discussed the attending provider and/or case management    IF patient discharges home will need the following DME: none       SUBJECTIVE:   Patient stated these tubes still hurt.     OBJECTIVE DATA SUMMARY:   Patient mobilized on continuous portable monitor/telemetry.   Critical Behavior:  Neurologic State: Alert  Orientation Level: Oriented X4  Cognition: Follows commands  Safety/Judgement: Fall prevention    Functional Mobility Training:  Bed Mobility:         Session began and ended in sitting             Transfers:  Sit to Stand: Stand-by assistance  Stand to Sit: Stand-by assistance                               Balance:  Sitting: Intact  Standing: Impaired  Standing - Static: Good  Standing - Dynamic : Fair    Ambulation/Gait Training:  Distance (ft): 200 Feet (ft)     Ambulation - Level of Assistance: Contact guard assistance        Gait Abnormalities: Altered arm swing;Decreased step clearance              Speed/Any: Slow  Step Length: Left shortened;Right shortened                         Cardiac diagnosis intervention:  Patient instructed and educated on mindful movement principles based on Move in The Tube concept to include maintaining bilateral elbows close to rib cage when performing any load-bearing activity such as getting in/out of bed, pushing up from a chair, opening a door, or lifting a box. Patient was given a handout with diagrams of each correct/incorrect method of performing each of the above tasks. Therapeutic Exercises:   Patient instructed on the benefits and demonstrated cardiac exercises while sitting with Supervision. Instructed and indicated understanding on how to progress reps, sets against gravity, pacing through progressive muscle strengthening standing based on surgeon clearance for more weight in prep for functional activity. Instruction on the use of household items in place of weights as needed.      CARDIAC  EXERCISE   Sets   Reps   Active Active Assist   Passive Self ROM   Comments   Shoulder flexion 1 5 [x]                                            []                                            []                                            []                                               Shoulder abduction 1      5 [x]                                            []                                            []                                            []                                               Scapular elevation 1 5 [x]                                            []                                            []                                            []                                               Scapular retraction 1 5 [x]                                            []                                            []                                            []                                               Trunk rotation 1 5 [x]                                            []                                            []                                            [x]                                               Trunk sidebending 1 5 [x]                                            []                                            [] []                                                  []                                            []                                            []                                            []                                                   Pain Rating:  Complaints of chest tube placement    Activity Tolerance:   Good    After treatment patient left in no apparent distress:   Sitting in chair and Call bell within reach    COMMUNICATION/COLLABORATION:   The patients plan of care was discussed with: Registered nurse.      Kimi Davis, PT, DPT   Time Calculation: 25 mins

## 2022-01-09 NOTE — PROGRESS NOTES
RN spoke with chaparrita Silva and verified heparin gtt rate change. PTT was 42, so RN will give 30 unit/kg bolus and increase heparin gtt by 2 units/kg/hr (heparin will be infusing at 12.5 units/kg/hr) and recheck PTT in 6 hours.

## 2022-01-09 NOTE — PROGRESS NOTES
End of Shift Note    Bedside shift change report given to Rox Gonzalez and Amada Yap (oncoming nurse) by Adithya Moore (offgoing nurse). Report included the following information SBAR, Kardex, Intake/Output, MAR, Recent Results and Cardiac Rhythm NSR    Shift worked:  7 am to 7 pm     Shift summary and any significant changes:    Pt worked with PT today. Pt walked in halls and sat in recliner multiple times during shift. Pt worked on incentive spirometer; continues to need encouragement. Pain meds given as requested. Heparin gtt restarted. Insulin gtt still infusing. Total chest tube output for shift: 120 mL. Concerns for physician to address:  none     Zone phone for oncoming shift:   [phone]         Activity:  Activity Level: Up with Assistance  Number times ambulated in hallways past shift: 2  Number of times OOB to chair past shift: 3    Cardiac:   Cardiac Monitoring: Yes      Cardiac Rhythm: Sinus Rhythm    Access:   Current line(s): PIV     Genitourinary:   Urinary status: voiding    Respiratory:   O2 Device: None (Room air)  Chronic home O2 use?: NO  Incentive spirometer at bedside: YES  Actual Volume (ml): 750 ml  GI:  Last Bowel Movement Date:  (couple days ago per pt)  Current diet:  ADULT DIET Clear Liquid  Passing flatus: YES  Tolerating current diet: YES       Pain Management:   Patient states pain is manageable on current regimen: YES    Skin:  Delmar Score: 17  Interventions: increase time out of bed and PT/OT consult    Patient Safety:  Fall Score:  Total Score: 3  Interventions: gripper socks and pt to call before getting OOB  High Fall Risk: Yes    Length of Stay:  Expected LOS: 3d 19h  Actual LOS: [id number] [facility]

## 2022-01-09 NOTE — PROGRESS NOTES
CSS Progress Note    Admit Date: 2022  POD:  3 Day Post-Op    Procedure:  Procedure(s):  AORTIC VALVE REPLACEMENT (AVR), MECHANICAL VALVE, ECC, DIMITRI AND EPI AORTIC US BY DR Marilyn Barriga        Subjective:   Pt seen with Dr. Carmella Castaneda. Afebrile, on RA. On insulin gtt. Up in chair. Complaining of pain with deep breathing. NSR LBBB     Objective:   Vitals:  Blood pressure 139/72, pulse 85, temperature 98 °F (36.7 °C), resp. rate 24, weight 219 lb 5.7 oz (99.5 kg), SpO2 96 %. Temp (24hrs), Av.4 °F (36.9 °C), Min:97.9 °F (36.6 °C), Max:99.3 °F (37.4 °C)    EKG/Rhythm:  NSR LBBB      CT Output: 120 ml yesterday daytime -no documentation overnight. Oxygen Therapy:  Oxygen Therapy  O2 Sat (%): 96 % (22 0721)  Pulse via Oximetry: 86 beats per minute (22 0721)  O2 Device: None (Room air) (22 232)  Skin Assessment: Clean, dry, & intact (22 035)  O2 Flow Rate (L/min): 4 l/min (22 0357)  FIO2 (%): 50 % (22 1500)    CXR:   CXR Results  (Last 48 hours)               22 0539  XR CHEST PORT Final result    Impression:  Bilateral pleural effusions stable. Narrative:  Clinical indication: Postop heart. Portable AP semiupright view of the chest obtained, comparison . Bilateral pleural reaction appears stable. The heart size is also unchanged. There is no focal infiltrate. 22 0526  XR CHEST PORT Final result    Impression:  Trace bilateral pleural effusions. Mild right basilar airspace   opacity likely atelectasis       Narrative:  EXAM: XR CHEST PORT       INDICATION: postop heart       COMPARISON: 2022       FINDINGS: A portable AP radiograph of the chest was obtained at 519 hours. Median sternotomy changes are noted. . Small bilateral pleural effusions are   again identified unchanged. . Median sternotomy changes are again noted. Right IJ   Kenton-Christian catheter has been removed. ..   The bones and soft tissues are grossly   within normal limits. Mild right basilar atelectasis. Admission Weight: Last Weight   Weight: 209 lb 7 oz (95 kg) Weight: 219 lb 5.7 oz (99.5 kg)     Intake / Output / Drain:  Current Shift:  07 - 0  In: 420 [P.O.:420]  Out: -   Last 24 hrs.:     Intake/Output Summary (Last 24 hours) at 2022 0959  Last data filed at 2022 0911  Gross per 24 hour   Intake 1174.54 ml   Output 2390 ml   Net -1215.46 ml       EXAM:  General:   Alert, NAD                                                                                           Lungs:   Clear upper, diminished bases to auscultation bilaterally. Incision:  dsg cdi   Heart:  Regular rate and rhythm, S1, S2 normal, no murmur, click, rub or gallop. Abdomen:   Soft, non-tender. Bowel sounds hypoactive. No masses,  No organomegaly. Extremities:  No edema. PPP. Neurologic:  Gross motor and sensory apparatus intact. Labs:   Recent Labs     22  0916 22  0801 22  0620   WBC  --   --  9.1   HGB  --   --  11.1*   HCT  --   --  32.4*   PLT  --   --  141*   NA  --   --  135*   K  --   --  3.9   BUN  --   --  11   CREA  --   --  0.99   GLU  --   --  98   GLUCPOC 137*   < >  --    INR  --   --  1.0    < > = values in this interval not displayed. Assessment:     Active Problems: Aortic stenosis (2021)      S/P AVR (aortic valve replacement) (2022)      Overview: AORTIC VALVE REPLACEMENT (AVR) with 25mm mechanical Dowell valve      Debridement of calcium off anterior leaflet of the mitral valve         Plan/Recommendations/Medical Decision Makin. AS s/p AVR mechanical (on-x valve): On ASA, cont heparin gtt, cont coumadin tonight. INR goal 2-3 for 3 months and 1.5-2 thereafter. Pharmacy to dose coumadin this weekend. Keep CT until INR closer to goal.     2. HTN: stop norvasc, start BB, PRN hydralazine. 3. New LBBB: initially pacer dependent, now NSR with LBBB, monitor rhythm, has pacing wires.      4. Atelectasis: Encourage I/S, on RA    5. Post op anemia st acute blood loss: Monitor H&H and CT output    6. Leukocytosis: Mild, afebrile, monitor    7. Thrombocytopenia: Mild, trend    8. Alcohol use: Pt drinks beer daily (more then 3-6 per day per patient report). Cont thiamine and folic acid, librium qhs, prn valium for CIWA protocol. 9. Pain control: Cont PO oxycodone and tylenol. Increased gabapentin. Cont lidocaine patches PRN dilauded for breathrough pain only    9. Dispo: PT/OT, Working towards home with [facility] likely early next week.      Signed By: Reggie Cartwright NP

## 2022-01-09 NOTE — PROGRESS NOTES
Pharmacist Daily Dosing of Warfarin    Indication & Goal INR: Mechanical Valve, INR Goal 2-3 for three months and then down to 1.5 - 2 (On-X valve)    PTA Warfarin Dose: new start    Notable concurrent conditions and medications: Amiodarone    Labs:  Recent Labs     01/09/22  0620 01/08/22  0442 01/08/22  0442 01/07/22  1445 01/07/22  1445 01/07/22  1405 01/07/22  0418 01/06/22  1716 01/06/22  1306 01/06/22  1306   INR 1.0  --  1.0  --   --   --   --   --   --  1.1   HGB 11.1*   < > 11.3*   < > 11.9*   < > 10.9* 12.8   < > 12.2   *  --  124*  --  153   < > 128*  --    < > 134*   TBILI  --   --   --   --   --   --  0.6 0.8  --  0.4   ALB  --   --   --   --   --   --  3.8 3.7   < > 3.2*    < > = values in this interval not displayed. Impression/Plan:   · Very stubborn INR. Will order warfarin 7.5 mg PO x 1 dose. · Daily INR has been ordered  · CBC w/o differential every other day has been ordered     Pharmacy will follow daily and adjust the dose as appropriate.     Thank you,  Shy Williamson, PHARMD      Warfarin Protocol    Located on pharmacy Teams site: Clinical Practice -> Anticoagulation & Cardiology -> Anticoagulation Policies, Protocols, Guidance

## 2022-01-10 ENCOUNTER — APPOINTMENT (OUTPATIENT)
Dept: GENERAL RADIOLOGY | Age: 47
DRG: 219 | End: 2022-01-10
Attending: NURSE PRACTITIONER
Payer: COMMERCIAL

## 2022-01-10 LAB
ANION GAP SERPL CALC-SCNC: 7 MMOL/L (ref 5–15)
APTT PPP: 32.5 SEC (ref 22.1–31)
BUN SERPL-MCNC: 17 MG/DL (ref 6–20)
BUN/CREAT SERPL: 17 (ref 12–20)
CALCIUM SERPL-MCNC: 9.1 MG/DL (ref 8.5–10.1)
CHLORIDE SERPL-SCNC: 102 MMOL/L (ref 97–108)
CO2 SERPL-SCNC: 27 MMOL/L (ref 21–32)
CREAT SERPL-MCNC: 1.03 MG/DL (ref 0.7–1.3)
ERYTHROCYTE [DISTWIDTH] IN BLOOD BY AUTOMATED COUNT: 11.9 % (ref 11.5–14.5)
GLUCOSE SERPL-MCNC: 99 MG/DL (ref 65–100)
HCT VFR BLD AUTO: 31.4 % (ref 36.6–50.3)
HGB BLD-MCNC: 10.6 G/DL (ref 12.1–17)
INR PPP: 1.2 (ref 0.9–1.1)
MAGNESIUM SERPL-MCNC: 2.6 MG/DL (ref 1.6–2.4)
MCH RBC QN AUTO: 32.3 PG (ref 26–34)
MCHC RBC AUTO-ENTMCNC: 33.8 G/DL (ref 30–36.5)
MCV RBC AUTO: 95.7 FL (ref 80–99)
NRBC # BLD: 0 K/UL (ref 0–0.01)
NRBC BLD-RTO: 0 PER 100 WBC
PLATELET # BLD AUTO: 201 K/UL (ref 150–400)
PMV BLD AUTO: 10.1 FL (ref 8.9–12.9)
POTASSIUM SERPL-SCNC: 3.9 MMOL/L (ref 3.5–5.1)
PROTHROMBIN TIME: 12.6 SEC (ref 9–11.1)
RBC # BLD AUTO: 3.28 M/UL (ref 4.1–5.7)
SODIUM SERPL-SCNC: 136 MMOL/L (ref 136–145)
THERAPEUTIC RANGE,PTTT: ABNORMAL SECS (ref 58–77)
WBC # BLD AUTO: 6.9 K/UL (ref 4.1–11.1)

## 2022-01-10 PROCEDURE — 85027 COMPLETE CBC AUTOMATED: CPT

## 2022-01-10 PROCEDURE — 74011250636 HC RX REV CODE- 250/636: Performed by: THORACIC SURGERY (CARDIOTHORACIC VASCULAR SURGERY)

## 2022-01-10 PROCEDURE — 74011250637 HC RX REV CODE- 250/637: Performed by: NURSE PRACTITIONER

## 2022-01-10 PROCEDURE — 74011000250 HC RX REV CODE- 250: Performed by: NURSE PRACTITIONER

## 2022-01-10 PROCEDURE — 74011250637 HC RX REV CODE- 250/637: Performed by: THORACIC SURGERY (CARDIOTHORACIC VASCULAR SURGERY)

## 2022-01-10 PROCEDURE — 85730 THROMBOPLASTIN TIME PARTIAL: CPT

## 2022-01-10 PROCEDURE — 36415 COLL VENOUS BLD VENIPUNCTURE: CPT

## 2022-01-10 PROCEDURE — 99231 SBSQ HOSP IP/OBS SF/LOW 25: CPT | Performed by: CLINICAL NURSE SPECIALIST

## 2022-01-10 PROCEDURE — 97116 GAIT TRAINING THERAPY: CPT

## 2022-01-10 PROCEDURE — 80048 BASIC METABOLIC PNL TOTAL CA: CPT

## 2022-01-10 PROCEDURE — 97535 SELF CARE MNGMENT TRAINING: CPT

## 2022-01-10 PROCEDURE — 85610 PROTHROMBIN TIME: CPT

## 2022-01-10 PROCEDURE — 97110 THERAPEUTIC EXERCISES: CPT

## 2022-01-10 PROCEDURE — 71045 X-RAY EXAM CHEST 1 VIEW: CPT

## 2022-01-10 PROCEDURE — 65660000000 HC RM CCU STEPDOWN

## 2022-01-10 PROCEDURE — 74011250636 HC RX REV CODE- 250/636: Performed by: NURSE PRACTITIONER

## 2022-01-10 PROCEDURE — 83735 ASSAY OF MAGNESIUM: CPT

## 2022-01-10 PROCEDURE — 74011000250 HC RX REV CODE- 250: Performed by: THORACIC SURGERY (CARDIOTHORACIC VASCULAR SURGERY)

## 2022-01-10 RX ORDER — METOPROLOL TARTRATE 50 MG/1
50 TABLET ORAL EVERY 12 HOURS
Status: DISCONTINUED | OUTPATIENT
Start: 2022-01-10 | End: 2022-01-11 | Stop reason: HOSPADM

## 2022-01-10 RX ORDER — ENOXAPARIN SODIUM 100 MG/ML
100 INJECTION SUBCUTANEOUS EVERY 12 HOURS
Status: DISCONTINUED | OUTPATIENT
Start: 2022-01-10 | End: 2022-01-11 | Stop reason: HOSPADM

## 2022-01-10 RX ORDER — CEPHALEXIN 250 MG/1
500 CAPSULE ORAL EVERY 6 HOURS
Status: DISCONTINUED | OUTPATIENT
Start: 2022-01-10 | End: 2022-01-11 | Stop reason: HOSPADM

## 2022-01-10 RX ORDER — WARFARIN 2.5 MG/1
7.5 TABLET ORAL ONCE
Status: COMPLETED | OUTPATIENT
Start: 2022-01-10 | End: 2022-01-10

## 2022-01-10 RX ADMIN — FAMOTIDINE 20 MG: 20 TABLET ORAL at 08:52

## 2022-01-10 RX ADMIN — POLYETHYLENE GLYCOL 3350 17 G: 17 POWDER, FOR SOLUTION ORAL at 08:52

## 2022-01-10 RX ADMIN — FOLIC ACID 1 MG: 1 TABLET ORAL at 08:52

## 2022-01-10 RX ADMIN — ENOXAPARIN SODIUM 100 MG: 100 INJECTION SUBCUTANEOUS at 08:52

## 2022-01-10 RX ADMIN — HEPARIN SODIUM 22.5 UNITS/KG/HR: 10000 INJECTION, SOLUTION INTRAVENOUS at 03:26

## 2022-01-10 RX ADMIN — CHLORDIAZEPOXIDE HYDROCHLORIDE 25 MG: 25 CAPSULE ORAL at 17:22

## 2022-01-10 RX ADMIN — GABAPENTIN 300 MG: 300 CAPSULE ORAL at 17:22

## 2022-01-10 RX ADMIN — WARFARIN SODIUM 7.5 MG: 2.5 TABLET ORAL at 17:22

## 2022-01-10 RX ADMIN — MELATONIN 3 MG: at 21:34

## 2022-01-10 RX ADMIN — FAMOTIDINE 20 MG: 20 TABLET ORAL at 21:34

## 2022-01-10 RX ADMIN — Medication 100 MG: at 08:53

## 2022-01-10 RX ADMIN — DOCUSATE SODIUM 50MG AND SENNOSIDES 8.6MG 1 TABLET: 8.6; 5 TABLET, FILM COATED ORAL at 17:22

## 2022-01-10 RX ADMIN — Medication 400 MG: at 17:22

## 2022-01-10 RX ADMIN — DOCUSATE SODIUM 50MG AND SENNOSIDES 8.6MG 1 TABLET: 8.6; 5 TABLET, FILM COATED ORAL at 08:53

## 2022-01-10 RX ADMIN — ENOXAPARIN SODIUM 100 MG: 100 INJECTION SUBCUTANEOUS at 21:34

## 2022-01-10 RX ADMIN — CEPHALEXIN 500 MG: 250 CAPSULE ORAL at 13:08

## 2022-01-10 RX ADMIN — SODIUM CHLORIDE, PRESERVATIVE FREE 10 ML: 5 INJECTION INTRAVENOUS at 13:10

## 2022-01-10 RX ADMIN — CEPHALEXIN 500 MG: 250 CAPSULE ORAL at 17:22

## 2022-01-10 RX ADMIN — METOPROLOL TARTRATE 50 MG: 50 TABLET, FILM COATED ORAL at 08:53

## 2022-01-10 RX ADMIN — OXYCODONE 10 MG: 5 TABLET ORAL at 13:08

## 2022-01-10 RX ADMIN — SODIUM CHLORIDE, PRESERVATIVE FREE 10 ML: 5 INJECTION INTRAVENOUS at 21:36

## 2022-01-10 RX ADMIN — CEPHALEXIN 500 MG: 250 CAPSULE ORAL at 23:50

## 2022-01-10 RX ADMIN — Medication 400 MG: at 08:53

## 2022-01-10 RX ADMIN — HEPARIN SODIUM 4000 UNITS: 1000 INJECTION INTRAVENOUS; SUBCUTANEOUS at 07:29

## 2022-01-10 RX ADMIN — CHLORHEXIDINE GLUCONATE 10 ML: 1.2 RINSE ORAL at 21:34

## 2022-01-10 RX ADMIN — OXYCODONE 10 MG: 5 TABLET ORAL at 03:35

## 2022-01-10 RX ADMIN — GABAPENTIN 300 MG: 300 CAPSULE ORAL at 21:34

## 2022-01-10 RX ADMIN — HEPARIN SODIUM 4000 UNITS: 1000 INJECTION INTRAVENOUS; SUBCUTANEOUS at 01:19

## 2022-01-10 RX ADMIN — METOPROLOL TARTRATE 50 MG: 50 TABLET, FILM COATED ORAL at 21:34

## 2022-01-10 RX ADMIN — ASPIRIN 81 MG CHEWABLE TABLET 81 MG: 81 TABLET CHEWABLE at 08:52

## 2022-01-10 RX ADMIN — SODIUM CHLORIDE, PRESERVATIVE FREE 10 ML: 5 INJECTION INTRAVENOUS at 06:38

## 2022-01-10 RX ADMIN — MUPIROCIN: 20 OINTMENT TOPICAL at 21:36

## 2022-01-10 RX ADMIN — MUPIROCIN: 20 OINTMENT TOPICAL at 08:53

## 2022-01-10 RX ADMIN — CHLORHEXIDINE GLUCONATE 10 ML: 1.2 RINSE ORAL at 08:52

## 2022-01-10 RX ADMIN — CEPHALEXIN 500 MG: 250 CAPSULE ORAL at 08:52

## 2022-01-10 RX ADMIN — GABAPENTIN 300 MG: 300 CAPSULE ORAL at 08:53

## 2022-01-10 NOTE — PROGRESS NOTES
Transition of Care Plan:     RUR: 5%  Disposition: Home with Home Health (AT [address])  Follow up appointments: PCP  DME needed: None  Transportation at Discharge: Pt's wife will transport at d/c.  Janay Jarrettw or means to access home:  Pt has access      IM Medicare Letter: Not needed  Is patient a BCPI-A Bundle:  N/A                    If yes, was Bundle Letter given?:  N/A  Is patient a  and connected with the [address] [facility]? N/A  If yes, was [place] transfer form completed and [facility] notified? N/A  Caregiver Contact: Lisa Quan- Wife [phone]  Discharge Caregiver contacted prior to discharge? CM will notify caregiver at d/c.    11:10am-CM met with pt to discuss d/c plan. No new needs at this time. CM will continue to follow pt for d/c planning needs.      Jolene Guthrie [address]  [phone]

## 2022-01-10 NOTE — PROGRESS NOTES
End of Shift Note    Bedside shift change report given to Katharine Lopez (oncoming nurse) by Jackson Tay (offgoing nurse). Report included the following information SBAR, Kardex, Intake/Output, MAR, Recent Results and Cardiac Rhythm NSR    Shift worked:  7 am to 7 pm     Shift summary and any significant changes:    Pt worked with PT today. Pt walked in halls and sat in recliner multiple times during shift. Pt worked on incentive spirometer. Pain meds given as requested. Pt transitioned to a regular diet, Insulin gtt to be turned off at 8 pm. Total chest tube output for shift: 90 mL. Concerns for physician to address:  none     Zone phone for oncoming shift:   [phone]         Activity:  Activity Level: Up with Assistance  Number times ambulated in hallways past shift: 2  Number of times OOB to chair past shift: 3    Cardiac:   Cardiac Monitoring: Yes      Cardiac Rhythm: Sinus Rhythm    Access:   Current line(s): PIV     Genitourinary:   Urinary status: voiding    Respiratory:   O2 Device: None (Room air)  Chronic home O2 use?: NO  Incentive spirometer at bedside: YES  Actual Volume (ml): 750 ml  GI:  Last Bowel Movement Date:  (couple of days ago)  Current diet:  ADULT DIET Regular; No Concentrated Sweets  Passing flatus: YES  Tolerating current diet: YES       Pain Management:   Patient states pain is manageable on current regimen: YES    Skin:  Delmar Score: 21  Interventions: increase time out of bed and PT/OT consult    Patient Safety:  Fall Score:  Total Score: 3  Interventions: gripper socks and pt to call before getting OOB  High Fall Risk: Yes    Length of Stay:  Expected LOS: 3d 19h  Actual LOS: [address]/[facility]

## 2022-01-10 NOTE — PROGRESS NOTES
CSS Progress Note    Admit Date: 2022  POD:  4 Day Post-Op    Procedure:  Procedure(s):  AORTIC VALVE REPLACEMENT (AVR), MECHANICAL VALVE, ECC, DIMITRI AND EPI AORTIC US BY DR Pavan Salgado        Subjective:   Pt seen with Dr. Pearl Gallagher. Afebrile, on RA. On heparin gtt. Up in chair. Complaining of pain with deep breathing. NSR LBBB     Objective:   Vitals:  Blood pressure (!) 148/72, pulse 78, temperature 98.3 °F (36.8 °C), resp. rate 18, weight 208 lb 8.9 oz (94.6 kg), SpO2 96 %. Temp (24hrs), Av.2 °F (36.8 °C), Min:98 °F (36.7 °C), Max:98.4 °F (36.9 °C)    EKG/Rhythm:  NSR LBBB      CT Output: 130mL (40mL last 12 hrs)    Oxygen Therapy: RA    CXR:   CXR Results  (Last 48 hours)               01/10/22 0559  XR CHEST PORT Final result    Impression:      No significant interval change. Narrative:  EXAM: XR CHEST PORT   Clinical history: Postop   INDICATION: postop heart       COMPARISON: 2022       FINDINGS: A portable AP radiograph of the chest was obtained at 5:28 AM. Median   sternotomy changes are noted. . Small bilateral pleural effusions are again   identified unchanged. . Median sternotomy changes are again noted. Right IJ   Playas-Christian catheter has been removed. .. The bones and soft tissues are grossly   within normal limits. Mild right basilar atelectasis. 22 0539  XR CHEST PORT Final result    Impression:  Bilateral pleural effusions stable. Narrative:  Clinical indication: Postop heart. Portable AP semiupright view of the chest obtained, comparison . Bilateral pleural reaction appears stable. The heart size is also unchanged. There is no focal infiltrate. Admission Weight: Last Weight   Weight: 209 lb 7 oz (95 kg) Weight: 208 lb 8.9 oz (94.6 kg)     Intake / Output / Drain:  Current Shift: No intake/output data recorded.   Last 24 hrs.:     Intake/Output Summary (Last 24 hours) at 1/10/2022 0959  Last data filed at 1/10/2022 0317  Gross per 24 hour Intake 624.75 ml   Output 1100 ml   Net -475.25 ml       EXAM:  General:   Alert, NAD                                                                                           Lungs:   Clear upper, diminished bases to auscultation bilaterally. Incision:  ANDREA. + erythema, no swelling or drainage   Heart:  Regular rate and rhythm, S1, S2 normal, no murmur, click, rub or gallop. Abdomen:   Soft, non-tender. Bowel sounds hypoactive. No masses,  No organomegaly. Extremities:  No edema. PPP. Neurologic:  Gross motor and sensory apparatus intact. Labs:   Recent Labs     01/10/22  0625 22  1930 22  0620   WBC 6.9  --    < >   HGB 10.6*  --    < >   HCT 31.4*  --    < >     --    < >     --    < >   K 3.9  --    < >   BUN 17  --    < >   CREA 1.03  --    < >   GLU 99  --    < >   GLUCPOC  --  106  --    INR 1.2*  --    < >    < > = values in this interval not displayed. 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 AS s/p AVR mechanical (on-x valve): On ASA, cont coumadin tonight. INR goal 2-3 for 3 months and 1.5-2 thereafter. Pharmacy to dose coumadin - adding SQ lovenox today. D/c hep gtt. CT removed today per Dr. Delmar Motley. Pt and wife will need Lovenox education and training - please allow him to give injections today for practice. 2. HTN: Increase metoprolol today, PRN hydralazine. 3. New LBBB: initially pacer dependent, now NSR with LBBB, monitor rhythm, has pacing wires. 4. Atelectasis: Encourage I/S, on RA    5. Post op anemia st acute blood loss: Monitor H&H     6. Leukocytosis: Mild, afebrile, monitor    7. Thrombocytopenia: Mild, trend. Up to 201 this am    8.  Alcohol use: Pt drinks beer daily (more then 3-6 per day per patient report). Cont thiamine and folic acid, librium qhs, prn valium for CIWA protocol. 9. Pain control: Cont PO oxycodone and tylenol. Increased gabapentin. Cont lidocaine patches PRN dilauded for breathrough pain only      Dispo: PT/OT, Stepdown. OOB TID, ambulating TID. Case management following to aid in d/c planning. [facility] tomorrow.       Signed By: Ralf Joyner NP

## 2022-01-10 NOTE — PROGRESS NOTES
Spiritual Care Assessment/Progress Note  [facility]      NAME: Marianne Florentino      MRN: [id number]  AGE: 55 y.o. SEX: male  Mormon Affiliation: No preference   Language: English     1/10/2022     Total Time (in minutes): 11     Spiritual Assessment begun in [facility] PROGRESSIVE CARE through conversation with:         [x]Patient        [x] Family    [] Friend(s)        Reason for Consult: Initial/Spiritual assessment, patient floor     Spiritual beliefs: (Please include comment if needed)     [] Identifies with a kia tradition:         [] Supported by a kia community:            [] Claims no spiritual orientation:           [] Seeking spiritual identity:                [] Adheres to an individual form of spirituality:           [x] Not able to assess:                           Identified resources for coping:      [] Prayer                               [] Music                  [] Guided Imagery     [x] Family/friends                 [] Pet visits     [] Devotional reading                         [] Unknown     [] Other:                                               Interventions offered during this visit: (See comments for more details)    Patient Interventions: Affirmation of emotions/emotional suffering,Catharsis/review of pertinent events in supportive environment,Coping skills reviewed/reinforced,Initial/Spiritual assessment, patient floor     Family/Friend(s):  Affirmation of emotions/emotional suffering,Catharsis/review of pertinent events in supportive environment,Coping skills reviewed/reinforced     Plan of Care:     [x] Support spiritual and/or cultural needs    [] Support AMD and/or advance care planning process      [] Support grieving process   [] Coordinate Rites and/or Rituals    [] Coordination with community clergy   [] No spiritual needs identified at this time   [] Detailed Plan of Care below (See Comments)  [] Make referral to Music Therapy  [] Make referral to Pet Therapy     [] Make referral to Addiction services  [] Make referral to [facility]  [] Make referral to Spiritual Care Partner  [] No future visits requested        [x] Contact Spiritual Care for further referrals     Comments:     Initial Spiritual Care Assessment visit for Mr. Shruti Franco in 2272. Reviewed the chart before visit. Patient was alert, his wife Mrs. Shruti Franco was present during the visit. Patient shared about his progress and satisfaction of care he is receiving.  affirmed and encouraged him to continue the healing and recovery. According to him, they have two kids, 15 and 4, his mother in law is taking care of them while Mrs. Shruti Franco is supporting her . They denied any spiritual/emotional needs but were thankful. Advised of  availability.       [id number] [facility] Shona Ocampo,[place]ore[place], [address] [address] Provider   Paging Service [phone])

## 2022-01-10 NOTE — PROGRESS NOTES
Problem: Self Care Deficits Care Plan (Adult)  Goal: *Acute Goals and Plan of Care (Insert Text)  Description: FUNCTIONAL STATUS PRIOR TO ADMISSION: Patient was independent and active without use of DME.     HOME SUPPORT: The patient lived alone with family to provide assistance. Occupational Therapy Goals  Initiated 1/7/2022  1. Patient will perform ADLs standing 5 mins without fatigue or LOB with supervision/set-up within 7 day(s). 2.  Patient will perform lower body ADLs with supervision/set-up within 7 day(s). 3.  Patient will perform gathering ADL items high and low 2/2 with supervision/set-up within 7 day(s). 4.  Patient will perform toilet transfers with supervision/set-up within 7 day(s). 5.  Patient will perform all aspects of toileting with supervision/set-up within 7 day(s). 6.  Patient will participate in cardiac/sternal upper extremity therapeutic exercise/activities to increase independence with ADLs with supervision/set-up for 5 minutes within 7 day(s). OCCUPATIONAL THERAPY TREATMENT/DISCHARGE  Patient: Vinicius Redman (34 y.o. male)  Date: 1/10/2022  Diagnosis: Aortic stenosis [I35.0] <principal problem not specified>  Procedure(s) (LRB):  AORTIC VALVE REPLACEMENT (AVR), MECHANICAL VALVE, ECC, DIMITRI AND EPI AORTIC US BY DR Lianet Tipton (N/A) 4 Days Post-Op  Precautions: Sternal (move in the tube)  Chart, occupational therapy assessment, plan of care, and goals were reviewed. ASSESSMENTPt was educated on UB and LB dressing, and able to demonstrate tailor sitting in order to dress LB. Pt is able to perform cardiac ex in standing, and was educated on taking a shower at home and offered for pt to take a shower here or to wait and take one at home and he and wife felt comfortable with taking a shower at home. Pt has no further OT questions and or needs and will be discharged from OT at this time. Patient continues with skilled OT services and is progressing towards goals.    Patient is verbalizing and is demonstrating understanding of mindful-based movements (\"move in the tube\") principles of keeping UEs proximal to ribcage to prevent lateral pull on the sternum during load-bearing activities with verbal cues required for compliance. Current Level of Function (ADLs/self-care): setup for ADLs         PLAN :  Rationale for discharge: Goals achieved  Recommend with staff: OOB for meals and walk to bathroom  Recommendation for discharge: (in order for the patient to meet his/her long term goals)  No skilled occupational therapy/ follow up rehabilitation needs identified at this time. This discharge recommendation:  Has been made in collaboration with the attending provider and/or case management    IF patient discharges home will need the following DME:none       SUBJECTIVE:   Patient stated I think we will be fine at home.     OBJECTIVE DATA SUMMARY:   Cognitive/Behavioral Status:  Neurologic State: Alert  Orientation Level: Appropriate for age  Cognition: Appropriate decision making  Perception: Appears intact  Perseveration: No perseveration noted  Safety/Judgement: Awareness of environment    Functional Mobility and Transfers for ADLs:  Bed Mobility:  Scooting: Stand-by assistance    Transfers:  Sit to Stand: Stand-by assistance  Functional Transfers  Bathroom Mobility: Stand-by assistance  Toilet Transfer : Stand-by assistance       Balance:  Sitting: Intact  Standing: Impaired; Without support  Standing - Static: Good  Standing - Dynamic : Good    ADL Intervention:  Feeding  Feeding Assistance: Independent    Grooming  Grooming Assistance: Set-up; Supervision  Position Performed: Standing  Washing Face: Supervision  Washing Hands: Supervision  Brushing Teeth: Supervision    Upper Body Bathing  Bathing Assistance: Set-up; Supervision  Position Performed: Standing    Lower Body Bathing  Bathing Assistance: Set-up; Stand-by assistance  Perineal  : Set-up; Stand-by assistance  Position Performed: Standing              Toileting  Toileting Assistance: Stand-by assistance  Bladder Hygiene: Stand-by assistance  Bowel Hygiene: Stand-by assistance    Cognitive Retraining  Safety/Judgement: Awareness of environment    Patient instructed and educated on mindful movement principles based on Move in The Tube concept to include maintaining bilateral elbows close to rib cage when performing any load-bearing activity such as getting in/out of bed, pushing up from a chair, opening a door, or lifting a box. Patient was given a handout with diagrams of each correct/incorrect method of performing each of the above tasks. Patient instructed on the ability to utilize upper extremities outside the tube when doing any non-load bearing activity such as washing hair/body, brushing teeth, retrieving clothing items, or scratching your back. Patient encouraged to also perform upper extremity exercises \"outside of the tube\" to prevent scar tissue formation around sternal incision site. Patient instructed in detail about activities to heed with caution, allowing pain to be the guide. These activities include but are not limited to: mowing the lawn, riding a bike, walking a dog, lifting a child, workshop hobbies, golfing, sexual activity, vacuuming, fishing, scrubbing the floors, and moving furniture. Patient was given the [address] in the [place] handout to describe each of these activities in detail. Patient instructed no asymmetrical reaching over head to ensure B UEs when shoulders >90* i.e. reaching in cabinets and dressing. Instruction on upper body dressing techniques of over head, then arms through to decrease pain and unilateral shoulder flexion >90*. Instruction on the benefits of utilizing B UEs during functional tasks i.e. opening the fridge, stepping into the tub. Instruction if continued pain at home with shoulder IR for BM hygiene can use wet wipes and toilet tongs PRN.  Avoid valsalva maneuvers. May have to adjust home setup to increase ease with items closer to waist height to prevent deep bending and the automatic  of asymmetrical UE WB/pushing for stabilization during bending. Benefit to don clothing tailor sitting and don all clothing while sitting prior to standing. Patient demonstrated lower body dressing with Supervision. Instruction and indicated understanding on the benefits of loose clothing throughout to accommodate for post surgical swelling, decreased ROM and increased pain. Instruction and indicated understanding the technique of pull over shirt versus front open clothing. Increase activity tolerance for home, work, and sexual intercourse by pacing self with increasing the arm exercises, sitting duration, frequency OOB, walking, standing, and ADLs. Instructed and indicated understanding of s/s of too much activity, how to respond to s/s safely. Therapeutic Exercises:   Patient instructed on the benefits and demonstrated cardiac exercises while standing with Independent. Instructed and indicated understanding on how to progress reps, sets against gravity, pacing through progressive muscle strengthening standing based on surgeon clearance for more weight in prep for basic and instrumental ADLs. Instruction on the use of household items in place of weights as needed.     CARDIAC   EXERCISE    Sets    Reps    Active  Active Assist    Passive  Self ROM    Comments    Shoulder flexion  1  5   [x]                            []                             []                             []                                Shoulder abduction  1  5  [x]                             []                             []                             []                                Scapular elevation  1  5  [x]                             []                              []                             []                                Scapular retraction  1  5  [x] []                             []                             []                                Trunk rotation  1  5  [x]                             []                             []                             []                                Trunk sidebending  1  5  [x]                             []                              []                             []                                          Pain:  none    Activity Tolerance:   Fair    After treatment patient left in no apparent distress:   Sitting in chair, Call bell within reach, and Caregiver / family present    COMMUNICATION/COLLABORATION:   The patients plan of care was discussed with: Physical therapist and Registered nurse.      Vanessa Motta OT  Time Calculation: 18 mins

## 2022-01-10 NOTE — PROGRESS NOTES
Pharmacist Daily Dosing of Warfarin    Indication & Goal INR: Mechanical Valve, INR Goal 2-3 for three months and then down to 1.5 - 2 (On-X valve)    PTA Warfarin Dose: new start    Notable concurrent conditions and medications: None    Labs:  Recent Labs     01/10/22  0625 01/09/22  0620 01/09/22  0620 01/08/22  0442 01/08/22  0442   INR 1.2*  --  1.0  --  1.0   HGB 10.6*   < > 11.1*   < > 11.3*     --  141*  --  124*    < > = values in this interval not displayed. Impression/Plan:   Patient not on any interacting medications with warfarin; therefore, will be comfortable giving him another dose of warfarin 7.5 mg for tonight. Will order warfarin 7.5 mg PO x 1 dose. Changed the heparin to Enoxaparin   Daily INR has been ordered  CBC w/o differential every other day has been ordered     Pharmacy will follow daily and adjust the dose as appropriate.     Thank you,  Rahel Martinez, PHARMD      Warfarin Protocol    Located on pharmacy Teams site: Clinical Practice -> Anticoagulation & Cardiology -> Anticoagulation Policies, Protocols, Guidance

## 2022-01-10 NOTE — DIABETES MGMT
[address] [facility]     CLINICAL NURSE SPECIALIST CONSULT      Initial Presentation   Lior Ayers is a 55 y.o. male admitted 1/6/22 for planned CV surgical intervention. ECHO: EF 60%, calcified AoV with severe AS mean gradient 45 mmhg. Mild LAE. RVSP 28 mmhg. Mildly dilated ascending aorta 42 mm. CTA chest:  Arteriogram:  Thoracic aorta: Coarse aortic valvular calcifications. Minimal aneurysmal  dilatation of the ascending aorta measuring 4 cm. No significant thoracic aortic  stenosis. 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 No family history of diabetes    Blood glucose pattern        Evaluation   This overweight  male did not have diabetes PTA. No family history of diabetes. Glucostabilizer used post-operatively. Transitioned off 1/9/22. BGs normal.    No further intervention needed. Signing off.     Billing Code(s)   [x]?        [id number]  IP subsequent hospital care - 15 minutes     Before making these care recommendations, I personally reviewed the hospitalization record, including notes, laboratory & diagnostic data and current medications, and examined the patient at the bedside (circumstances permitting) before making care recommendations. More than fifty (50) percent of the time was spent in patient counseling and/or care coordination.   Total minutes: 15     JOSH Roth  Diabetes Clinical Nurse Specialist  [facility]  Access via [employer]

## 2022-01-10 NOTE — PROGRESS NOTES
Problem: Mobility Impaired (Adult and Pediatric)  Goal: *Acute Goals and Plan of Care (Insert Text)  Description: FUNCTIONAL STATUS PRIOR TO ADMISSION: Patient was independent and active without use of DME.     HOME SUPPORT PRIOR TO ADMISSION: The patient lived with his wife and kids. Physical Therapy Goals  Initiated 1/7/2022  1. Patient will move from supine to sit and sit to supine , scoot up and down, and roll side to side in bed with independence within 5 days. 2.  Patient will perform sit to/from stand with independence within 5 days. 3.  Patient will ambulate 250 feet with least restrictive assistive device and independence within 5 days. 4.  Patient will ascend/descend 2 stairs with one sided handrail(s) with modified independence within 5 days. 5.  Patient will perform cardiac exercises per protocol with modified independence within 5 days. 6.  Patient will verbally recall and functionally demonstrate mindful-based movements (\"move in the tube\") principles without cues within 5 days. Outcome: Progressing Towards Goal   PHYSICAL THERAPY TREATMENT  Patient: Cathy Arias (89 y.o. male)  Date: 1/10/2022  Diagnosis: Aortic stenosis [I35.0] <principal problem not specified>  Procedure(s) (LRB):  AORTIC VALVE REPLACEMENT (AVR), MECHANICAL VALVE, ECC, DIMITRI AND EPI AORTIC US BY DR Cleve Myrick (N/A) 4 Days Post-Op  Precautions: Sternal (move in the tube)  Chart, physical therapy assessment, plan of care and goals were reviewed. ASSESSMENT  Patient continues with skilled PT services and is progressing towards goals. Pt received sitting in chair, agreeable to PT session. Pt demonstrates improvement in activity tolerance today,  sit <> stands with SBA and no device and gait up to 220ft with no device and SBA-CGA. Pt educated on and performed 12 step negotiation with R Rail and CGA for balance,  no major safety concerns.  Reports performing cardiac exercises daily and able to demonstrate x10 reps each with good form and minimal verbal cues. Pt highly motivated  and would benefit [facility] for further mobility intervention upon discharge. Will continue to follow. Patient is verbalizing and is demonstrating understanding of mindful-based movements (\"move in the tube\") principles of keeping UEs proximal to ribcage to prevent lateral pull on the sternum during load-bearing activities with intermittent verbal cues required for compliance. Current Level of Function Impacting Discharge (mobility/balance): CGA-SBA with no device. Other factors to consider for discharge: lives in 1 [place] home, 2 [facility] with family present to assist.         PLAN :  Patient continues to benefit from skilled intervention to address the above impairments. Continue treatment per established plan of care. to address goals. Recommendation for discharge: (in order for the patient to meet his/her long term goals)  Physical therapy at least 2 days/week in the home     This discharge recommendation:  Has been made in collaboration with the attending provider and/or case management    IF patient discharges home will need the following DME: none       SUBJECTIVE:   Patient stated I have been walking a lot and doing exercises.     OBJECTIVE DATA SUMMARY:   Patient mobilized on continuous portable monitor/telemetry. Critical Behavior:  Neurologic State: Alert  Orientation Level: Appropriate for age  Cognition: Appropriate decision making  Safety/Judgement: Awareness of environment    Functional Mobility Training:  Bed Mobility:           Scooting: Stand-by assistance          Transfers:  Sit to Stand: Stand-by assistance  Stand to Sit: Stand-by assistance                               Balance:  Sitting: Intact  Standing: Impaired; Without support  Standing - Static: Good  Standing - Dynamic : Good    Ambulation/Gait Training:  Distance (ft): 220 Feet (ft)  Assistive Device: Gait belt  Ambulation - Level of Assistance: Stand-by assistance;Contact guard assistance        Gait Abnormalities: Decreased step clearance;Shuffling gait        Base of Support: Widened     Speed/Any: Shuffled  Step Length: Left shortened;Right shortened        Stairs:  Number of Stairs Trained: 12  Stairs - Level of Assistance: Contact guard assistance  Rail Use: Right     Cardiac diagnosis intervention:  Patient instructed and educated on mindful movement principles based on Move in The Tube concept to include maintaining bilateral elbows close to rib cage when performing any load-bearing activity such as getting in/out of bed, pushing up from a chair, opening a door, or lifting a box. Patient was given a handout with diagrams of each correct/incorrect method of performing each of the above tasks. Therapeutic Exercises:   Patient instructed on the benefits and demonstrated cardiac exercises while standing unsupported with Supervision. Instructed and indicated understanding on how to progress reps, sets against gravity, pacing through progressive muscle strengthening standing based on surgeon clearance for more weight in prep for functional activity. Instruction on the use of household items in place of weights as needed.      CARDIAC  EXERCISE   Sets   Reps   Active Active Assist   Passive Self ROM   Comments   Shoulder flexion 1 10 [x]                                            []                                            []                                            []                                               Shoulder abduction 1      10 [x]                                            []                                            []                                            []                                               Scapular elevation 1 10 [x]                                            []                                            []                                            []                                               Scapular retraction 1 10 [x]                                            []                                            []                                            []                                               Trunk rotation 1 10 [x]                                            []                                            []                                            [x]                                               Trunk sidebending 1 10 [x]                                            []                                            []                                            []                                                  []                                            []                                            []                                            []                                                   Pain Rating:  None reported. Activity Tolerance:   Good and requires rest breaks    After treatment patient left in no apparent distress:   Sitting in chair, Call bell within reach, and Caregiver / family present    COMMUNICATION/COLLABORATION:   The patients plan of care was discussed with: Occupational therapist and Registered nurse.      Drake Cannon PT, DPT, NCS   Time Calculation: 24 mins

## 2022-01-10 NOTE — PROGRESS NOTES
0700: End of Shift Note    Bedside shift change report given to Blaise ARANA  (oncoming nurse) by Deedra Gottron (offgoing nurse). Report included the following information SBAR, Kardex, Intake/Output, MAR, Recent Results and Cardiac Rhythm NSR    Shift worked:  [id number]       Shift summary and any significant changes:     Pt rested well over night. Insulin drip off. Pt had sternal pain over the night. Is demanding the chest tubes come out today. Concerns for physician to address:  see note above      Zone phone for oncoming shift:          Activity:  Activity Level: Up with Assistance  Number times ambulated in hallways past shift: 0  Number of times OOB to chair past shift: 2    Cardiac:   Cardiac Monitoring: Yes      Cardiac Rhythm: Sinus Rhythm    Access:   Current line(s): PIV     Genitourinary:   Urinary status: voiding    Respiratory:   O2 Device: None (Room air)  Chronic home O2 use?: YES  Incentive spirometer at bedside: YES  Actual Volume (ml): 700 ml  GI:  Last Bowel Movement Date:  (couple of days ago)  Current diet:  ADULT DIET Regular; No Concentrated Sweets  Passing flatus: YES  Tolerating current diet: YES       Pain Management:   Patient states pain is manageable on current regimen: YES    Skin:  Delmar Score: 21  Interventions: float heels and increase time out of bed    Patient Safety:  Fall Score:  Total Score: 3  Interventions: gripper socks and pt to call before getting OOB  High Fall Risk: Yes    Length of Stay:  Expected LOS: 3d 19h  Actual LOS: Cheslea Baker 371

## 2022-01-10 NOTE — PROGRESS NOTES
Cardiac Surgery Care Coordinator- Met with Nannette Hinojosa and his wife,  reviewed plan of care and discussed upcoming discharge date. Reinforced sternal precautions and encouraged continued use of the incentive spirometer. Began discharge teaching and encouraged them to verbalize. Reviewed goals for the day and discussed the  importance of increased activity and continued activity after discharge. Will continue to follow for educational and emotional needs.  Margo Jesus RN

## 2022-01-10 NOTE — PROGRESS NOTES
Problem: Falls - Risk of  Goal: *Absence of Falls  Description: Document David Martell Fall Risk and appropriate interventions in the flowsheet. Outcome: Progressing Towards Goal  Note: Fall Risk Interventions:  Mobility Interventions: Communicate number of staff needed for ambulation/transfer,OT consult for ADLs,Patient to call before getting OOB,PT Consult for mobility concerns,PT Consult for assist device competence    Mentation Interventions: Adequate sleep, hydration, pain control,Bed/chair exit alarm,More frequent rounding    Medication Interventions: Evaluate medications/consider consulting pharmacy,Patient to call before getting OOB,Teach patient to arise slowly    Elimination Interventions: Call light in reach,Patient to call for help with toileting needs,Toileting schedule/hourly rounds,Urinal in reach    History of Falls Interventions: Bed/chair exit alarm,Vital signs minimum Q4HRs X 24 hrs (comment for end date)         Problem: Pressure Injury - Risk of  Goal: *Prevention of pressure injury  Description: Document Delmar Scale and appropriate interventions in the flowsheet. Outcome: Progressing Towards Goal  Note: Pressure Injury Interventions:  Sensory Interventions: Assess changes in LOC,Assess need for specialty bed,Turn and reposition approx.  every two hours (pillows and wedges if needed)    Moisture Interventions: Absorbent underpads,Apply protective barrier, creams and emollients,Check for incontinence Q2 hours and as needed    Activity Interventions: Chair cushion,Increase time out of bed,Pressure redistribution bed/mattress(bed type),PT/OT evaluation    Mobility Interventions: Chair cushion,HOB 30 degrees or less,Pressure redistribution bed/mattress (bed type),PT/OT evaluation    Nutrition Interventions: Document food/fluid/supplement intake,Offer support with meals,snacks and hydration    Friction and Shear Interventions: HOB 30 degrees or less,Lift sheet,Minimize layers

## 2022-01-11 ENCOUNTER — APPOINTMENT (OUTPATIENT)
Dept: GENERAL RADIOLOGY | Age: 47
DRG: 219 | End: 2022-01-11
Attending: NURSE PRACTITIONER
Payer: COMMERCIAL

## 2022-01-11 ENCOUNTER — TELEPHONE ANTICOAG (OUTPATIENT)
Dept: CARDIOTHORACIC SURGERY | Age: 47
End: 2022-01-11

## 2022-01-11 VITALS
DIASTOLIC BLOOD PRESSURE: 77 MMHG | RESPIRATION RATE: 18 BRPM | WEIGHT: 206.79 LBS | TEMPERATURE: 97.9 F | OXYGEN SATURATION: 98 % | SYSTOLIC BLOOD PRESSURE: 141 MMHG | BODY MASS INDEX: 28.84 KG/M2 | HEART RATE: 82 BPM

## 2022-01-11 LAB
ANION GAP SERPL CALC-SCNC: 4 MMOL/L (ref 5–15)
BUN SERPL-MCNC: 19 MG/DL (ref 6–20)
BUN/CREAT SERPL: 20 (ref 12–20)
CALCIUM SERPL-MCNC: 9.5 MG/DL (ref 8.5–10.1)
CHLORIDE SERPL-SCNC: 103 MMOL/L (ref 97–108)
CO2 SERPL-SCNC: 29 MMOL/L (ref 21–32)
CREAT SERPL-MCNC: 0.95 MG/DL (ref 0.7–1.3)
ERYTHROCYTE [DISTWIDTH] IN BLOOD BY AUTOMATED COUNT: 11.9 % (ref 11.5–14.5)
GLUCOSE SERPL-MCNC: 103 MG/DL (ref 65–100)
HCT VFR BLD AUTO: 30.5 % (ref 36.6–50.3)
HGB BLD-MCNC: 10.4 G/DL (ref 12.1–17)
INR PPP: 1.4 (ref 0.9–1.1)
MAGNESIUM SERPL-MCNC: 2.5 MG/DL (ref 1.6–2.4)
MCH RBC QN AUTO: 32.5 PG (ref 26–34)
MCHC RBC AUTO-ENTMCNC: 34.1 G/DL (ref 30–36.5)
MCV RBC AUTO: 95.3 FL (ref 80–99)
NRBC # BLD: 0 K/UL (ref 0–0.01)
NRBC BLD-RTO: 0 PER 100 WBC
PLATELET # BLD AUTO: 238 K/UL (ref 150–400)
PMV BLD AUTO: 9.8 FL (ref 8.9–12.9)
POTASSIUM SERPL-SCNC: 4.3 MMOL/L (ref 3.5–5.1)
PROTHROMBIN TIME: 14.2 SEC (ref 9–11.1)
RBC # BLD AUTO: 3.2 M/UL (ref 4.1–5.7)
SODIUM SERPL-SCNC: 136 MMOL/L (ref 136–145)
WBC # BLD AUTO: 5.3 K/UL (ref 4.1–11.1)

## 2022-01-11 PROCEDURE — 85027 COMPLETE CBC AUTOMATED: CPT

## 2022-01-11 PROCEDURE — 80048 BASIC METABOLIC PNL TOTAL CA: CPT

## 2022-01-11 PROCEDURE — 74011250637 HC RX REV CODE- 250/637: Performed by: THORACIC SURGERY (CARDIOTHORACIC VASCULAR SURGERY)

## 2022-01-11 PROCEDURE — 74011000250 HC RX REV CODE- 250: Performed by: NURSE PRACTITIONER

## 2022-01-11 PROCEDURE — 74011250636 HC RX REV CODE- 250/636: Performed by: THORACIC SURGERY (CARDIOTHORACIC VASCULAR SURGERY)

## 2022-01-11 PROCEDURE — 74011000250 HC RX REV CODE- 250: Performed by: THORACIC SURGERY (CARDIOTHORACIC VASCULAR SURGERY)

## 2022-01-11 PROCEDURE — 83735 ASSAY OF MAGNESIUM: CPT

## 2022-01-11 PROCEDURE — 74011250637 HC RX REV CODE- 250/637: Performed by: NURSE PRACTITIONER

## 2022-01-11 PROCEDURE — 85610 PROTHROMBIN TIME: CPT

## 2022-01-11 PROCEDURE — 36415 COLL VENOUS BLD VENIPUNCTURE: CPT

## 2022-01-11 PROCEDURE — 71045 X-RAY EXAM CHEST 1 VIEW: CPT

## 2022-01-11 RX ORDER — METOPROLOL TARTRATE 50 MG/1
50 TABLET ORAL EVERY 12 HOURS
Qty: 60 TABLET | Refills: 1 | Status: SHIPPED | OUTPATIENT
Start: 2022-01-11

## 2022-01-11 RX ORDER — FUROSEMIDE 20 MG/1
20 TABLET ORAL DAILY
Status: DISCONTINUED | OUTPATIENT
Start: 2022-01-11 | End: 2022-01-11 | Stop reason: HOSPADM

## 2022-01-11 RX ORDER — AMOXICILLIN 250 MG
1 CAPSULE ORAL 2 TIMES DAILY
Qty: 60 TABLET | Refills: 0 | Status: SHIPPED | OUTPATIENT
Start: 2022-01-11

## 2022-01-11 RX ORDER — POTASSIUM CHLORIDE 750 MG/1
10 TABLET, EXTENDED RELEASE ORAL DAILY
Qty: 3 TABLET | Refills: 0 | Status: SHIPPED | OUTPATIENT
Start: 2022-01-11 | End: 2022-01-14

## 2022-01-11 RX ORDER — FUROSEMIDE 20 MG/1
20 TABLET ORAL DAILY
Qty: 3 TABLET | Refills: 0 | Status: SHIPPED | OUTPATIENT
Start: 2022-01-11

## 2022-01-11 RX ORDER — POLYETHYLENE GLYCOL 3350 17 G/17G
17 POWDER, FOR SOLUTION ORAL DAILY
Qty: 14 PACKET | Refills: 0 | Status: SHIPPED | OUTPATIENT
Start: 2022-01-12

## 2022-01-11 RX ORDER — ENOXAPARIN SODIUM 100 MG/ML
100 INJECTION SUBCUTANEOUS EVERY 12 HOURS
Qty: 10 ML | Refills: 0 | Status: SHIPPED | OUTPATIENT
Start: 2022-01-11 | End: 2022-01-16

## 2022-01-11 RX ORDER — OXYCODONE HYDROCHLORIDE 5 MG/1
5 TABLET ORAL
Qty: 20 TABLET | Refills: 0 | Status: SHIPPED | OUTPATIENT
Start: 2022-01-11 | End: 2022-01-14

## 2022-01-11 RX ORDER — CEPHALEXIN 500 MG/1
500 CAPSULE ORAL EVERY 6 HOURS
Qty: 16 CAPSULE | Refills: 0 | Status: SHIPPED | OUTPATIENT
Start: 2022-01-11 | End: 2022-01-15

## 2022-01-11 RX ORDER — WARFARIN SODIUM 5 MG/1
10 TABLET ORAL EVERY EVENING
Qty: 60 TABLET | Refills: 1 | Status: SHIPPED | OUTPATIENT
Start: 2022-01-11

## 2022-01-11 RX ORDER — WARFARIN 2.5 MG/1
10 TABLET ORAL ONCE
Status: DISCONTINUED | OUTPATIENT
Start: 2022-01-11 | End: 2022-01-11 | Stop reason: HOSPADM

## 2022-01-11 RX ORDER — GUAIFENESIN 100 MG/5ML
81 LIQUID (ML) ORAL DAILY
Qty: 30 TABLET | Refills: 1 | Status: SHIPPED | OUTPATIENT
Start: 2022-01-12

## 2022-01-11 RX ADMIN — FUROSEMIDE 20 MG: 20 TABLET ORAL at 11:11

## 2022-01-11 RX ADMIN — CEPHALEXIN 500 MG: 250 CAPSULE ORAL at 11:11

## 2022-01-11 RX ADMIN — Medication 100 MG: at 08:26

## 2022-01-11 RX ADMIN — MUPIROCIN: 20 OINTMENT TOPICAL at 08:26

## 2022-01-11 RX ADMIN — Medication 400 MG: at 08:26

## 2022-01-11 RX ADMIN — SODIUM CHLORIDE, PRESERVATIVE FREE 10 ML: 5 INJECTION INTRAVENOUS at 05:55

## 2022-01-11 RX ADMIN — CHLORHEXIDINE GLUCONATE 10 ML: 1.2 RINSE ORAL at 08:26

## 2022-01-11 RX ADMIN — METOPROLOL TARTRATE 50 MG: 50 TABLET, FILM COATED ORAL at 08:26

## 2022-01-11 RX ADMIN — ENOXAPARIN SODIUM 100 MG: 100 INJECTION SUBCUTANEOUS at 08:26

## 2022-01-11 RX ADMIN — ASPIRIN 81 MG CHEWABLE TABLET 81 MG: 81 TABLET CHEWABLE at 08:26

## 2022-01-11 RX ADMIN — POLYETHYLENE GLYCOL 3350 17 G: 17 POWDER, FOR SOLUTION ORAL at 08:26

## 2022-01-11 RX ADMIN — FAMOTIDINE 20 MG: 20 TABLET ORAL at 08:26

## 2022-01-11 RX ADMIN — DOCUSATE SODIUM 50MG AND SENNOSIDES 8.6MG 1 TABLET: 8.6; 5 TABLET, FILM COATED ORAL at 08:26

## 2022-01-11 RX ADMIN — CEPHALEXIN 500 MG: 250 CAPSULE ORAL at 05:54

## 2022-01-11 RX ADMIN — FOLIC ACID 1 MG: 1 TABLET ORAL at 08:26

## 2022-01-11 RX ADMIN — GABAPENTIN 300 MG: 300 CAPSULE ORAL at 08:26

## 2022-01-11 RX ADMIN — OXYCODONE 5 MG: 5 TABLET ORAL at 00:36

## 2022-01-11 NOTE — PROGRESS NOTES
Reviewed discharge instructions and prescriptions with patient and spouse. Patient verbalizes understanding. PIV removed and tele box returned to tele room.

## 2022-01-11 NOTE — PROGRESS NOTES
End of Shift Note    Bedside shift change report given to Blaise PAINTER RN (oncoming nurse) by Kassi Jerry RN (offgoing nurse). Report included the following information SBAR, Kardex, Intake/Output, MAR, Recent Results and Cardiac Rhythm NSR    Shift worked:  7p-7a     Shift summary and any significant changes:     Wound care completed this morning by patient along with full CHG bath. Wire care completed. Mild R shoulder pain overnight- oxy given      Concerns for physician to address:  none     Zone phone for oncoming shift:   [phone]       Activity:  Activity Level: Up ad nicolas  Number times ambulated in hallways past shift: 0  Number of times OOB to chair past shift: 2    Cardiac:   Cardiac Monitoring: Yes      Cardiac Rhythm: Sinus Rhythm; BBB    Access:   Current line(s): PIV     Genitourinary:   Urinary status: voiding    Respiratory:   O2 Device: None (Room air)  Chronic home O2 use?: NO  Incentive spirometer at bedside: YES  Actual Volume (ml): 1000 ml  GI:  Last Bowel Movement Date:  (couple of days ago)  Current diet:  ADULT DIET Regular; No Concentrated Sweets  Passing flatus: YES  Tolerating current diet: YES       Pain Management:   Patient states pain is manageable on current regimen: YES    Skin:  Delmar Score: 23  Interventions: float heels, increase time out of bed and nutritional support     Patient Safety:  Fall Score:  Total Score: 1  Interventions: assistive device (walker, cane, etc), gripper socks, pt to call before getting OOB and stay with me (per policy)  High Fall Risk: Yes    Length of Stay:  Expected LOS: 5d 21h  Actual LOS: 5      Kassi Jerry RN

## 2022-01-11 NOTE — DISCHARGE SUMMARY
[facility] Discharge Summary     Patient ID:  Tone Peraza  [id number]  55 y.o.  1975    Admit date: 1/6/2022    Discharge date: 1/11/2022     Admitting Physician: Melina Durant MD     Referring Cardiologist:  Nichole Melgar    PCP:  Asha Landa MD    Admitting Diagnoses: AS    Discharge Diagnoses:     Hospital Problems  Never Reviewed          Codes Class Noted POA    S/P AVR (aortic valve replacement) ICD-10-CM: Z95.2  ICD-9-CM: V43.3  1/6/2022 Unknown    Overview Signed 1/6/2022 12:22 PM by BRYSON Brasher     AORTIC VALVE REPLACEMENT (AVR) with 25mm mechanical Romeo valve  Debridement of calcium off anterior leaflet of the mitral valve             Aortic stenosis ICD-10-CM: I35.0  ICD-9-CM: 424.1  12/29/2021 Unknown              Discharged Condition: good    Disposition: home, see patient instructions for treatment and plan    Procedures for this admission:  Procedure(s):  AORTIC VALVE REPLACEMENT (AVR), MECHANICAL VALVE, ECC, DIMITRI AND EPI AORTIC US BY DR Isabell Skinner    Discharge Medications:      My Medications      START taking these medications      Instructions Each Dose to Equal Morning Noon Evening Bedtime   aspirin 81 mg chewable tablet  Start taking on: January 12, 2022    Your last dose was: Your next dose is: Take 1 Tablet by mouth daily. 81 mg                 cephALEXin 500 mg capsule  Commonly known as: KEFLEX    Your last dose was: Your next dose is: Take 1 Capsule by mouth every six (6) hours for 16 doses. 500 mg                 enoxaparin 100 mg/mL  Commonly known as: LOVENOX    Your last dose was: Your next dose is:         100 mg by SubCUTAneous route every twelve (12) hours every twelve (12) hours for 5 days. 100 mg                 furosemide 20 mg tablet  Commonly known as: LASIX    Your last dose was: Your next dose is: Take 1 Tablet by mouth daily.    20 mg                 metoprolol tartrate 50 mg tablet  Commonly known as: LOPRESSOR    Your last dose was: Your next dose is: Take 1 Tablet by mouth every twelve (12) hours. 50 mg                 oxyCODONE IR 5 mg immediate release tablet  Commonly known as: ROXICODONE    Your last dose was: Your next dose is: Take 1 Tablet by mouth every four (4) hours as needed for Pain for up to 3 days. Max Daily Amount: 30 mg.   5 mg                 polyethylene glycol 17 gram packet  Commonly known as: MIRALAX  Start taking on: January 12, 2022    Your last dose was: Your next dose is: Take 1 Packet by mouth daily. 17 g                 potassium chloride 10 mEq tablet  Commonly known as: KLOR-CON M10    Your last dose was: Your next dose is: Take 1 Tablet by mouth daily for 3 days. 10 mEq                 senna-docusate 8.6-50 mg per tablet  Commonly known as: PERICOLACE    Your last dose was: Your next dose is: Take 1 Tablet by mouth two (2) times a day. 1 Tablet                 warfarin 5 mg tablet  Commonly known as: COUMADIN    Your last dose was: Your next dose is: Take 2 Tablets by mouth every evening. Take 10 mg at 6pm on 1/11/22. INR to be checked by [facility]. Dosing determined by Dr. Lamine Pelayo office. 10 mg                    CONTINUE taking these medications      Instructions Each Dose to Equal Morning Noon Evening Bedtime   Vitamin C 500 mg tablet  Generic drug: ascorbic acid (vitamin C)    Your last dose was: Your next dose is: Take 500 mg by mouth daily. 500 mg                 zinc 50 mg Tab tablet    Your last dose was: Your next dose is: Take 50 mg by mouth daily.    50 mg                       Where to Get Your Medications      [facility] [address] [id number] [facility], [facility] [facility]    Phone: [phone]   · aspirin 81 mg chewable tablet  · cephALEXin 500 mg capsule  · enoxaparin 100 mg/mL  · furosemide 20 mg tablet  · metoprolol tartrate 50 mg tablet  · oxyCODONE IR 5 mg immediate release tablet  · polyethylene glycol 17 gram packet  · potassium chloride 10 mEq tablet  · senna-docusate 8.6-50 mg per tablet  · warfarin 5 mg tablet         INR TARGET- 2-3 for 3 months, then 1.5-2 thereafter  INR LEVEL to be drawn- [facility] nursing  INR management per Dr. Jj Klein office      HPI:   Patricio Maynard a 55 y. o. male who was referred for cardiac evaluation from Dr. Aspen Galvan for AS. He has a medical history of AS and recent Covid infection.      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 He has been followed by Cardiology since then. He had his annual TTE in November of this year and was told he needed to see cardiac surgery. Since then he has had anxiety and stress about his AV. He is very active. 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 He had been running and playing soccer. Does report some decrease in activity. More SOB when playing soccer that has occurred slowly over the past couple of years. He has had CP over the past couple weeks laying in bed, not with activity, when he is feeling anxious. He states it feels like heartburn. Has felt occasional flutters in his chest over past month. He has had SOB when falling asleep -never woken from sleep with SOB. He has also been attributing this to anxiety. He denies dizziness, edema. He had covid-19 in September 2021 and had SOB and chest tightness for about 3 days.       He is a former smoker -quit 13 years ago, smokes for about 10 years. He occasionally drinks alcohol. He is [de-identified] 3kids 15years old and 3years old. He works as a . He is active playing soccer and running. He does have a family history of AS -dad's brother and paternal grandfather.  Covid vaccine Pfizer 9/2021.      Cardiac Testing     Cardiac catheterization: none     ECHO: EF 60%, calcified AoV with severe AS mean gradient 45 mmhg. Mild LAE. RVSP 28 mmhg. Mildly dilated ascending aorta 42 mm.      CTA chest:  Arteriogram:     Thoracic aorta: Coarse aortic valvular calcifications. Minimal aneurysmal  dilatation of the ascending aorta measuring 4 cm. No significant thoracic aortic  stenosis. Great vessel origins are patent     Chest:     LUNGS: No focal mass or consolidation. LYMPH NODES: No thoracic lymphadenopathy. PLEURAL FLUID: No pleural effusion. PERICARDIAL FLUID: No pericardial effusion. THYROID: No thyroid nodule. OTHER: None        IMPRESSION     Aortic valvular calcifications. Borderline aneurysmal dilatation of the  ascending aorta measuring 4 cm. Hospital Course: The patient underwent a AVR on-x valve and debridement of calcium off anterior leaflet of MV by Dr. Ronaldo Reyes on 1/6/22 -see op note for details. He was transferred to the ICU in stable condition on precedex, insulin, and love gtts. He was extubated on 1/6/22 at 1515 hrs. He was hemodynamically stable and had transfer orders to stepdown on POD1. After medical optimization he was discharged home with the following plan:    1. AS s/p AVR mechanical (on-x valve): On ASA, cont coumadin tonight. INR goal 2-3 for 3 months and 1.5-2 thereafter. Pt and wife will need Lovenox education and training - please allow him to give injections today for practice. Will DC home on lovenox. [facility] nursing to check INR tomorrow.      2. HTN: cont metoprolol today, PRN hydralazine.      3. New LBBB: initially pacer dependent, now NSR with LBBB, monitor rhythm, dc wires today.      4. Atelectasis: Encourage I/S, on RA     5. Post op anemia st acute blood loss: Monitor H&H      6. Leukocytosis: Mild, afebrile, monitor     7. Thrombocytopenia: resolved     8. Alcohol use: Pt drinks beer daily (more then 3-6 per day per patient report).  Cont thiamine and folic acid, librium qhs, prn valium for CIWA protocol while inpatient.      9. Pain control: Cont PO oxycodone and tylenol.         Dispo: PT/OT, OOB TID, ambulating TID. Case management following to aid in d/c planning. Home with [facility] services today. [facility] nursing to check INR tomorrow and call results to Dr. Galina Prakash office. Referral to outpatient cardiac rehab made. Discharge Vital Signs:   Visit Vitals  BP (!) 141/77 (BP 1 Location: Right upper arm, BP Patient Position: At rest)   Pulse 82   Temp 97.9 °F (36.6 °C)   Resp 18   Wt 206 lb 12.7 oz (93.8 kg)   SpO2 98%   BMI 28.84 kg/m²       Labs:   Recent Labs     01/11/22  0453 01/10/22  0625 01/09/22  1930   WBC 5.3   < >  --    HGB 10.4*   < >  --    HCT 30.5*   < >  --       < >  --       < >  --    K 4.3   < >  --    BUN 19   < >  --    CREA 0.95   < >  --    *   < >  --    GLUCPOC  --   --  106   INR 1.4*   < >  --     < > = values in this interval not displayed. Diagnostics: CXR:   CXR Results  (Last 48 hours)               01/11/22 0554  XR CHEST PORT Final result    Impression:  Small pleural effusions. Narrative:  PORTABLE CHEST RADIOGRAPH/S: 1/11/2022 5:54 AM       INDICATION: Postop heart. COMPARISON: 1/10/2022, 1/9/2022, 1/8/2022, 1/7/2022. TECHNIQUE: Portable frontal upright radiograph/s of the chest.       FINDINGS:    Passive atelectasis is associated with small bilateral layering pleural   effusions. The central airways are patent. No pneumothorax. Post CABG. Patient Instructions/Follow Up Care:  Discharge instructions were reviewed with the patient and family present. Questions were also answered at this time. Prescriptions and medications were reviewed. The patient has a follow up appointment with the Nurse Practitioner or [de-identified] Assistant on 1/17/22 and with Dr. Anjum Burrows on 2/2/22. The patient was also instructed to follow up with his primary care physician as needed. The patient and family were encouraged to call with any questions or concerns.        Signed:  Petey Rea NP  1/11/2022  9:34 AM

## 2022-01-11 NOTE — PROGRESS NOTES
Pharmacist Discharge Medication Reconciliation    Significant PMH:   Past Medical History:   Diagnosis Date    Aortic stenosis     Aortic valve stenosis      Encounter Diagnoses:   Encounter Diagnosis   Name Primary? S/P AVR (aortic valve replacement)      Allergies: Patient has no known allergies. Discharge Medications:   Current Discharge Medication List        START taking these medications    Details   aspirin 81 mg chewable tablet Take 1 Tablet by mouth daily. Qty: 30 Tablet, Refills: 1  Start date: 1/12/2022      cephALEXin (KEFLEX) 500 mg capsule Take 1 Capsule by mouth every six (6) hours for 16 doses. Qty: 16 Capsule, Refills: 0  Start date: 1/11/2022, End date: 1/15/2022      enoxaparin (LOVENOX) 100 mg/mL 100 mg by SubCUTAneous route every twelve (12) hours every twelve (12) hours for 5 days. Qty: 10 mL, Refills: 0  Start date: 1/11/2022, End date: 1/16/2022      furosemide (LASIX) 20 mg tablet Take 1 Tablet by mouth daily. Qty: 3 Tablet, Refills: 0  Start date: 1/11/2022      metoprolol tartrate (LOPRESSOR) 50 mg tablet Take 1 Tablet by mouth every twelve (12) hours. Qty: 60 Tablet, Refills: 1  Start date: 1/11/2022      oxyCODONE IR (ROXICODONE) 5 mg immediate release tablet Take 1 Tablet by mouth every four (4) hours as needed for Pain for up to 3 days. Max Daily Amount: 30 mg.  Qty: 20 Tablet, Refills: 0  Start date: 1/11/2022, End date: 1/14/2022    Associated Diagnoses: S/P AVR (aortic valve replacement)      polyethylene glycol (MIRALAX) 17 gram packet Take 1 Packet by mouth daily. Qty: 14 Packet, Refills: 0  Start date: 1/12/2022      senna-docusate (PERICOLACE) 8.6-50 mg per tablet Take 1 Tablet by mouth two (2) times a day. Qty: 60 Tablet, Refills: 0  Start date: 1/11/2022      warfarin (COUMADIN) 5 mg tablet Take 2 Tablets by mouth every evening. Take 10 mg at 6pm on 1/11/22. [facility]. Dosing determined by Dr. Tania Amezcua office.   Qty: 60 Tablet, Refills: 1  Start date: 1/11/2022      potassium chloride (KLOR-CON M10) 10 mEq tablet Take 1 Tablet by mouth daily for 3 days. Qty: 3 Tablet, Refills: 0  Start date: 1/11/2022, End date: 1/14/2022           CONTINUE these medications which have NOT CHANGED    Details   zinc 50 mg tab tablet Take 50 mg by mouth daily. ascorbic acid, vitamin C, (Vitamin C) 500 mg tablet Take 500 mg by mouth daily. The patient's chart, MAR and AVS were reviewed by Nisha Stapleton [facility].     Discharging Provider: Anna Roberts NP    Thank you,     Nisha Stapleton, [facility]

## 2022-01-11 NOTE — PROGRESS NOTES
Cardiac Surgery Care Coordinator-  Met with Yash Quintero, reviewed plan of care and discharge instructions. Reinforced move in the tube, sternal precautions and continued use of the incentive spirometer. Yash Quintero is able to pull 750cc. Reviewed the importance of daily temp and weight monitoring, discussed incisional care and reviewed signs and symptoms of infection. Red reminder bracelet on right wrist, reviewed purpose of the bracelet and when to call the MD. Using the teach back method reviewed new medications. Reminded pt of appts and encouraged participation in the Cardiac Wellness and rehab program after discharge. Encouraged Yash Quintero to verbalize and emotional support given. Yash Quintero is without questions or concerns at this time.  Юлия Richardson RN

## 2022-01-11 NOTE — DISCHARGE INSTRUCTIONS
[facility]      [address]  [address]                                                           [address], [address]  Office- [phone]  Fax- [phone]        Office- [phone]  Fax- [phone]  _________________________________________________________________  Dr. Michelle Devries, DEBORAH Rudd, [address] [facility] [place]  Dr. Lilia Johns, NP                  Pedro Pablo Guzman, BRYSON Davies, DEBORAH Whitney, [address], BRYSON Petit, DEBORAH Waters, DEBORAH                                                            Name:Ilir Ocampo     Surgery & Date: Procedure(s):  AORTIC VALVE REPLACEMENT (AVR), MECHANICAL VALVE, ECC, DIMITRI AND EPI AORTIC US BY DR Tomás Morales    Discharge Date: 1/11/22    MEDICATIONS:  Please refer to your After Visit Summary for your medication list. If you do not have a prescription for a new medication, you may purchase the medication over the counter. DO NOT TAKE ANY MEDICATIONS THAT ARE NOT ON THIS LIST    INR [employer]-  2.0 - 3.0 for 3 months, then 1.5 - 2.0 thereafter  INR LEVEL to be drawn- 1/12/22  INR management per Dr. Lanette Quintanilla. Office phone [phone]    INSTRUCTIONS:  1. NO SMOKING OR TOBACCO PRODUCTS  2. Follow all the instructions in your discharge book  3. Shower daily. Wash all incisions twice daily with Dial soap and water. After each wash with soap and water, wash incisions with Dynahex 4 solution and rinse. Do this for 14 days. No lotions, ointments or powder.    4. Call the office immediately for any redness, swelling, or drainage from your incision. 5. Take your temperature daily and call for a temperature of 101 degrees or higher or for any symptoms that make you think you have and infection. 6. Weigh yourself each morning. Call if you gain more than 5 pounds in 48 hours. 7. Use the incentive spirometer 6-8 times a day-10 breaths each time. Use a pillow or your bear to splint your breastbone when coughing or sneezing. 8. If you feel your breast bone clicking or popping, notify the office immediately. 9. Walk several hundred feet several times daily. DIET  Eat an American Heart Association diet. If you are having trouble with your appetite, eat what you can. Try eating small, frequent meals throughout the day. Diabetic patients should follow an ADA diet. Check your blood sugars  And keep a log of your results. ACTIVITY  1. NO DRIVING--you will be evaluated to drive at your follow up visit. 2. Increase your activity by walking several times a day. Stay out of bed most of the day. 3. When sitting, keep your legs elevated. 4. You may ride in a car, but you must get out every hour and walk around. If you ride in a car with an airbag that can not be switched off, put the seat ALL the way back or ride in the back seat. 5. Any load bearing activity can be performed as long as it can be performed \"in the tube\". You can reach \"out of the tube\" when performing activities that don't require heavy lifting. Let pain be your guide, your pain level will keep you from doing anything extreme. Normal Problems After Discharge:   Some fatigue and difficulty sleeping   Poor appetite initially, with temporary change in taste   Temperature variability; feeling hot and cold   Chest wall soreness and paresthesia (numbness)   Some musculoskeletal pain along joint lines in chest and back.     Tylenol or NSAIDs will help unless contraindicated    Patients with EVH (Endoscopic Vein [place]) will have mild swelling in that leg due to temporary venous insufficiency   Elevation & compression stockings will help to reduce the swelling        FOLLOW UP  1. Your first follow up appointment will be on 1/17/22 at 11:00am. Our office is located in [address] [place] [address], [address]. Your second follow up appointment will be in four weeks, on 2/2/22 at 2:45pm. Please call our office at [phone] if you are unable to make either one of these appointments. 2. Your intake appointment with Cardiac Rehab will be on 2/8/22 at 8:00am. They are located in the [address] [place] [address], [address]. Their phone number is [phone].   3. Your appointment with Dr. Gerald Cheung will be on 2/21/22 at 2:45pm. Office phone is [phone]. 4. Consult you primary care physician regarding your influenza & pneumovax vaccines. 5. Please bring all medications (or a complete list) with you to your appointment. 6. Do not hesitate to contact our office 24/7 with any questions or concerns.  Call the number on your red bracelet ([phone])      Signature:___________________________________________________ to rule out dysphagia

## 2022-01-11 NOTE — PROGRESS NOTES
CSS Progress Note    Admit Date: 2022  POD:  5 Day Post-Op    Procedure:  Procedure(s):  AORTIC VALVE REPLACEMENT (AVR), MECHANICAL VALVE, ECC, DIMITRI AND EPI AORTIC US BY DR Motley Running        Subjective:   Pt seen with Dr. Kamran Marx. Afebrile, on RA. Up in chair. NSR LBBB. No complaints. Pt wants to go home. Objective:   Vitals:  Blood pressure (!) 141/77, pulse 82, temperature 97.9 °F (36.6 °C), resp. rate 18, weight 206 lb 12.7 oz (93.8 kg), SpO2 98 %. Temp (24hrs), Av.2 °F (36.8 °C), Min:97.9 °F (36.6 °C), Max:98.5 °F (36.9 °C)    EKG/Rhythm:  NSR LBBB    Oxygen Therapy: RA    CXR:   CXR Results  (Last 48 hours)               22 0554  XR CHEST PORT Final result    Impression:  Small pleural effusions. Narrative:  PORTABLE CHEST RADIOGRAPH/S: 2022 5:54 AM       INDICATION: Postop heart. COMPARISON: 1/10/2022, 2022, 2022, 2022. TECHNIQUE: Portable frontal upright radiograph/s of the chest.       FINDINGS:    Passive atelectasis is associated with small bilateral layering pleural   effusions. The central airways are patent. No pneumothorax. Post CABG.            01/10/22 0559  XR CHEST PORT Final result    Impression:      No significant interval change. Narrative:  EXAM: XR CHEST PORT   Clinical history: Postop   INDICATION: postop heart       COMPARISON: 2022       FINDINGS: A portable AP radiograph of the chest was obtained at 5:28 AM. Median   sternotomy changes are noted. . Small bilateral pleural effusions are again   identified unchanged. . Median sternotomy changes are again noted. Right IJ   [place] catheter has been removed. .. The bones and soft tissues are grossly   within normal limits. Mild right basilar atelectasis. Admission Weight: Last Weight   Weight: 209 lb 7 oz (95 kg) Weight: 206 lb 12.7 oz (93.8 kg)     Intake / Output / Drain:  Current Shift: No intake/output data recorded.   Last 24 hrs.:     Intake/Output Summary (Last 24 hours) at 2022 0921  Last data filed at 1/10/2022 2351  Gross per 24 hour   Intake    Output 600 ml   Net -600 ml       EXAM:  General:   Alert, NAD                                                                                           Lungs:   Clear upper, diminished bases to auscultation bilaterally. Incision:  ANDREA. + erythema, no swelling or drainage   Heart:  Regular rate and rhythm, S1, S2 normal, no murmur, click, rub or gallop. Abdomen:   Soft, non-tender. Bowel sounds hypoactive. No masses,  No organomegaly. Extremities:  No edema. PPP. Neurologic:  Gross motor and sensory apparatus intact. Labs:   Recent Labs     22  0453 01/10/22  0625 22  1930   WBC 5.3   < >  --    HGB 10.4*   < >  --    HCT 30.5*   < >  --       < >  --       < >  --    K 4.3   < >  --    BUN 19   < >  --    CREA 0.95   < >  --    *   < >  --    GLUCPOC  --   --  106   INR 1.4*   < >  --     < > = values in this interval not displayed. 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 AS s/p AVR mechanical (on-x valve): On ASA, cont coumadin tonight. INR goal 2-3 for 3 months and 1.5-2 thereafter. Pt and wife will need Lovenox education and training - please allow him to give injections today for practice. Will DC home on lovenox. [facility] nursing to check INR tomorrow. 2. HTN: cont metoprolol today, PRN hydralazine. 3. New LBBB: initially pacer dependent, now NSR with LBBB, monitor rhythm, dc wires today. 4. Atelectasis: Encourage I/S, on RA    5. Post op anemia st acute blood loss: Monitor H&H     6. Leukocytosis: Mild, afebrile, monitor    7. Thrombocytopenia: resolved    8.  Alcohol use: Pt drinks beer daily (more then 3-6 per day per patient report). Cont thiamine and folic acid, librium qhs, prn valium for CIWA protocol while inpatient. 9. Pain control: Cont PO oxycodone and tylenol. Dispo: PT/OT, OOB TID, ambulating TID. Case management following to aid in d/c planning. Home with [facility] services today. [facility] nursing to check INR tomorrow and call results to Dr. Brie Esteban office.      Signed By: Trey Doran NP

## 2022-01-11 NOTE — PROGRESS NOTES
Pharmacist Daily Dosing of Warfarin    Indication & Goal INR: Mechanical Valve, INR Goal 2-3 for three months and then down to 1.5 - 2 (On-X valve)    PTA Warfarin Dose: new start    Notable concurrent conditions and medications: None    Labs:  Recent Labs     01/11/22  0453 01/10/22  0625 01/10/22  0625 01/09/22  0620 01/09/22  0620   INR 1.4*  --  1.2*  --  1.0   HGB 10.4*   < > 10.6*   < > 11.1*     --  201  --  141*    < > = values in this interval not displayed. Impression/Plan:   Will order warfarin 10 mg PO x 1 dose for today. Upon discharge, he can be on warfarin 7.5 mg every day   Enoxaparin until the INR is >2  Daily INR has been ordered  CBC w/o differential every other day has been ordered     Pharmacy will follow daily and adjust the dose as appropriate.     Thank you,  Latha Finch, PHARMD      Warfarin Protocol    Located on pharmacy Teams site: Clinical Practice -> Anticoagulation & Cardiology -> Anticoagulation Policies, Protocols, Guidance

## 2022-01-11 NOTE — PROGRESS NOTES
Physical Therapy    Chart reviewed and spoke with patient. Per pt report, pt walking laps in hallway overnight with no increased pain or mobility concerns. Pt up ad nicolas in room and hallway, no device needed. Reports likely DC home today. Pt cleared on stairs and gait training last PT session (1/10). Pt with no current acute care PT needs. Will sign off.      Kelsey Hutchins PT, DPT, NCS

## 2022-01-12 LAB — INR, EXTERNAL: 1.7

## 2022-01-13 ENCOUNTER — TELEPHONE ANTICOAG (OUTPATIENT)
Dept: CARDIOTHORACIC SURGERY | Age: 47
End: 2022-01-13

## 2022-01-13 NOTE — ADT AUTH CERT NOTES
XAI#[id number]           Patient Demographics    Patient Name   Kamran Santiago   [id number] Legal Sex   Male    1975 Address   [facility]   [address] [address] Phone   [phone] [facility])   [phone] (Mobile)   CSN:   [id number]     [facility] Date: Admit Time Room Bed   2022  5:32 AM 2272 [[id number]]  [[id number]]       Attending Providers    Provider Pager From To   Balta Suarez MD  22   Utilization Reviews         Cardiac Valve Replacement or Repair - Care Day 5 (1/10/2022) by Manuelito Craven RN       Review Entered Review Status   1/10/2022 15:57 Completed      Criteria Review      Care Day: 5 Care Date: 1/10/2022 Level of Care: ICU    Guideline Day 4    Clinical Status    ( ) * Pain absent or managed    ( ) * Tolerates increased activity    Routes    ( ) * Oral hydration    Interventions    ( ) * Central lines absent    ( ) * Pacer wires absent    Medications    ( ) * Epidural analgesics and PCA absent    * Milestone   Additional Notes   1/10/2022      ATTENDING NOTE:       Pt seen with Dr. Guicho Berger. Afebrile, on RA. On heparin gtt. Up in chair. Complaining of pain with deep breathing. NSR LBBB        Objective:   Vitals:   Blood pressure (!) 148/72, pulse 78, temperature 98.3 °F (36.8 °C), resp. rate 18, weight 208 lb 8.9 oz (94.6 kg), SpO2 96 %. Temp (24hrs), Av.2 °F (36.8 °C), Min:98 °F (36.7 °C), Max:98.4 °F (36.9 °C)       EKG/Rhythm:  NSR LBBB      CT Output: 130mL (40mL last 12 hrs)      EXAM:   General:     Alert, NAD                                                                                           Lungs:   Clear upper, diminished bases to auscultation bilaterally. Incision: ANDREA. + erythema, no swelling or drainage   Heart: Regular rate and rhythm, S1, S2 normal, no murmur, click, rub or gallop. Abdomen:   Soft, non-tender. Bowel sounds hypoactive. No masses,  No organomegaly. Extremities: No edema. PPP.     Neurologic: Gross motor and sensory apparatus intact.       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 AS s/p AVR mechanical (on-x valve): On ASA, cont coumadin tonight. INR goal 2-3 for 3 months and 1.5-2 thereafter. Pharmacy to dose coumadin - adding SQ lovenox today. D/c hep gtt. CT removed today per Dr. Hope Meyer. Pt and wife will need Lovenox education and training - please allow him to give injections today for practice.        2. HTN: Increase metoprolol today, PRN hydralazine.        3. New LBBB: initially pacer dependent, now NSR with LBBB, monitor rhythm, has pacing wires.        4. Atelectasis: Encourage I/S, on RA       5. Post op anemia st acute blood loss: Monitor H&H        6. Leukocytosis: Mild, afebrile, monitor       7. Thrombocytopenia: Mild, trend. Up to 201 this am       8. Alcohol use: Pt drinks beer daily (more then 3-6 per day per patient report). Cont thiamine and folic acid, librium qhs, prn valium for CIWA protocol.        9. Pain control: Cont PO oxycodone and tylenol. Increased gabapentin. Cont lidocaine patches PRN dilauded for breathrough pain only           Dispo: PT/OT, Stepdown. OOB TID, ambulating TID. Case management following to aid in d/c planning.  Home with [facility] services likely tomorrow.        LABS:       1/10/2022 06:25   WBC: 6.9   NRBC: 0.0   RBC: 3.28 (L)   HGB: 10.6 (L)   HCT: 31.4 (L)   MCV: 95.7   MCH: 32.3   MCHC: 33.8   RDW: 11.9   PLATELET: 855   MPV: 10.1   ABSOLUTE NRBC: 0.00   INR: 1.2 (H)   Prothrombin time: 12.6 (H)   aPTT: 32.5 (H)   Sodium: 136   Potassium: 3.9   Chloride: 102   CO2: 27   Anion gap: 7   Glucose: 99   BUN: 17   Creatinine: 1.03   BUN/Creatinine ratio: 17   Calcium: 9.1 Magnesium: 2.6 (H)   GFR est non-AA: >60   GFR est AA: >60           Cardiac Valve Replacement or Repair - Care Day 4 (2022) by Rodri Valladares RN       Review Entered Review Status   1/10/2022 15:56 Completed      Criteria Review      Care Day: 4 Care Date: 2022 Level of Care: ICU    Guideline Day 3    Clinical Status    ( ) * Dangerous arrhythmia absent    Activity    ( ) * Advance activity    Interventions    ( ) * Chest tube absent    * Milestone   Additional Notes   2022      ATTENDING NOTE:       Pt seen with Dr. Samantha Mayorga. Afebrile, on RA. On insulin gtt. Endy Rm in chair. Complaining of pain with deep breathing. NSR LBBB        Objective:   Vitals:   Blood pressure 139/72, pulse 85, temperature 98 °F (36.7 °C), resp. rate 24, weight 219 lb 5.7 oz (99.5 kg), SpO2 96 %. Temp (24hrs), Av.4 °F (36.9 °C), Min:97.9 °F (36.6 °C), Max:99.3 °F (37.4 °C)       EKG/Rhythm:  NSR LBBB      CT Output: 120 ml yesterday daytime -no documentation overnight. EXAM:   General:     Alert, NAD                                                                                           Lungs:   Clear upper, diminished bases to auscultation bilaterally. Incision: dsg cdi   Heart: Regular rate and rhythm, S1, S2 normal, no murmur, click, rub or gallop. Abdomen:   Soft, non-tender. Bowel sounds hypoactive. No masses,  No organomegaly. Extremities: No edema. PPP. Neurologic: Gross motor and sensory apparatus intact. 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 AS s/p AVR mechanical (on-x valve): On ASA, cont heparin gtt, cont coumadin tonight. INR goal 2-3 for 3 months and 1.5-2 thereafter.  Pharmacy to dose coumadin this weekend. Keep CT until INR closer to goal.        2. HTN: stop norvasc, start BB, PRN hydralazine.        3. New LBBB: initially pacer dependent, now NSR with LBBB, monitor rhythm, has pacing wires.        4. Atelectasis: Encourage I/S, on RA       5. Post op anemia st acute blood loss: Monitor H&H and CT output       6. Leukocytosis: Mild, afebrile, monitor       7. Thrombocytopenia: Mild, trend       8. Alcohol use: Pt drinks beer daily (more then 3-6 per day per patient report). Cont thiamine and folic acid, librium qhs, prn valium for CIWA protocol.        9. Pain control: Cont PO oxycodone and tylenol. Increased gabapentin. Cont lidocaine patches PRN dilauded for breathrough pain only       9. Dispo: PT/OT, Working towards home with [place] likely early next week. LABS:       1/9/2022 06:20   WBC: 9.1   NRBC: 0.0   RBC: 3.35 (L)   HGB: 11.1 (L)   HCT: 32.4 (L)   MCV: 96.7   MCH: 33.1   MCHC: 34.3   RDW: 12.0   PLATELET: 369 (L)   MPV: 11.0   ABSOLUTE NRBC: 0.00   INR: 1.0   Prothrombin time: 10.9   aPTT: 36.4 (H)   Sodium: 135 (L)   Potassium: 3.9   Chloride: 100   CO2: 29   Anion gap: 6   Glucose: 98   BUN: 11   Creatinine: 0.99   BUN/Creatinine ratio: 11 (L)   Calcium: 9.3   Magnesium: 2.4   GFR est non-AA: >60   GFR est AA: >60           Cardiac Valve Replacement or Repair - Care Day 3 (1/8/2022) by Jd Lee RN       Review Entered Review Status   1/10/2022 15:55 Completed      Criteria Review      Care Day: 3 Care Date: 1/8/2022 Level of Care: ICU    Guideline Day 2    Clinical Status    ( ) * Procedure completed    ( ) * Mechanical ventilation absent    ( ) * Hemodynamic stability    Activity    ( ) * Limited ambulation    Interventions    ( ) * Arterial line absent    * Milestone   Additional Notes   1/8/2022      ATTENDING NOTE:       Pt seen with Dr. Marcelo Au. Afebrile, on 4L NC. On insulin.  Up in chair. No complaints.  NSR LBBB        Objective:   Vitals:   Blood pressure 125/69, pulse 82, temperature 97.7 °F (36.5 °C), resp. rate 20, weight 219 lb 5.7 oz (99.5 kg), SpO2 100 %. Temp (24hrs), Av.1 °F (36.7 °C), Min:97.7 °F (36.5 °C), Max:98.8 °F (37.1 °C)       EKG/Rhythm:  NSR LBBB      CT Output: 625 ml/24 hrs      EXAM:   General:     Alert, NAD                                                                                           Lungs:   Clear upper, diminished bases to auscultation bilaterally. Incision: dsg cdi   Heart: Regular rate and rhythm, S1, S2 normal, no murmur, click, rub or gallop. Abdomen:   Soft, non-tender. Bowel sounds hypoactive. No masses,  No organomegaly. Extremities: No edema. PPP. Neurologic: Gross motor and sensory apparatus intact. 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 AS s/p AVR mechanical (on-x valve): On ASA, start heparin gtt, cont coumadin tonight. INR goal 2-3 for 3 months and 1.5-2 thereafter. Pharmacy to dose coumadin this weekend. Keep CT until INR closer to goal.        2. HTN: cont norvasc, PRN hydralazine.        3. New LBBB: initially pacer dependent, now NSR with LBBB, monitor rhythm, has pacing wires.        4. Atelectasis: Encourage I/S, wean O2 for sats > 92%       5. Post op anemia st acute blood loss: Monitor H&H and CT output       6. Leukocytosis: Mild, afebrile, monitor       7. Thrombocytopenia: Mild, trend       8. Alcohol use: Pt drinks beer daily (more then 3-6 per day per patient report). Cont thiamine and folic acid, librium qhs, prn valium for CIWA protocol.        9. Pain control: Cont PO oxycodone and tylenol. Increase gabapentin. Start lidocaine patchesPRN dilauded for breathrough pain only       9. Dispo: PT/OT, Working towards home with [place] likely early next week.        LABS:       1/8/2022 04:42   WBC: 10.8   NRBC: 0.0   RBC: 3.48 (L)   HGB: 11.3 (L)   HCT: 33.9 (L)   MCV: 97.4   MCH: 32.5   MCHC: 33.3   RDW: 12.4   PLATELET: 513 (L)   MPV: 10.6   ABSOLUTE NRBC: 0.00   INR: 1.0   Prothrombin time: 10.8   aPTT: 32.2 (H)   Sodium: 133 (L)   Potassium: 4.6   Chloride: 101   CO2: 28   Anion gap: 4 (L)   Glucose: 100   BUN: 13   Creatinine: 1.13   BUN/Creatinine ratio: 12   Calcium: 9.0   Magnesium: 2.6 (H)   GFR est non-AA: >60   GFR est AA: >60

## 2022-01-13 NOTE — CARDIO/PULMONARY
Cardiac Rehab Note: chart review    Aortic Valve Replacement 1/6/22    Smoking history assessed. Patient is a former smoker, quit 2007. Smoking Cessation Program link has not been added to the AVS.     EF 60%  on 1/11/22 per NP note    Valveeducational folder with \"Cardiac Surgery Post-Op Instructions\" book, heart healthy eating, warning signs, heart facts, heart aware, resources, and Valve Surgery booklet given to Ihsan David by Cardiac Surgery Navigator    Patient was discharged prior to being seen    CP Rehab will attempt to follow up.

## 2022-01-14 ENCOUNTER — TELEPHONE ANTICOAG (OUTPATIENT)
Dept: CARDIOTHORACIC SURGERY | Age: 47
End: 2022-01-14

## 2022-01-14 ENCOUNTER — TELEPHONE (OUTPATIENT)
Dept: CARDIOTHORACIC SURGERY | Age: 47
End: 2022-01-14

## 2022-01-14 LAB — INR, EXTERNAL: 2

## 2022-01-14 NOTE — PROGRESS NOTES
INR 2.0. Called Cristhian with [facility]. Instructed to tell pt to stop lovenox injections. Take 7.5mg of coumadin Fri, Sat, Sun and recheck INR on Monday with HH.

## 2022-01-17 ENCOUNTER — TELEPHONE ANTICOAG (OUTPATIENT)
Dept: CARDIOTHORACIC SURGERY | Age: 47
End: 2022-01-17

## 2022-01-17 ENCOUNTER — OFFICE VISIT (OUTPATIENT)
Dept: CARDIOTHORACIC SURGERY | Age: 47
End: 2022-01-17
Payer: COMMERCIAL

## 2022-01-17 VITALS
SYSTOLIC BLOOD PRESSURE: 142 MMHG | BODY MASS INDEX: 28.06 KG/M2 | TEMPERATURE: 97.8 F | WEIGHT: 200.4 LBS | HEIGHT: 71 IN | DIASTOLIC BLOOD PRESSURE: 64 MMHG | RESPIRATION RATE: 18 BRPM | OXYGEN SATURATION: 94 % | HEART RATE: 84 BPM

## 2022-01-17 DIAGNOSIS — Z95.2 S/P AVR (AORTIC VALVE REPLACEMENT): Primary | ICD-10-CM

## 2022-01-17 LAB — INR, EXTERNAL: 1.8

## 2022-01-17 PROCEDURE — 99024 POSTOP FOLLOW-UP VISIT: CPT | Performed by: THORACIC SURGERY (CARDIOTHORACIC VASCULAR SURGERY)

## 2022-01-17 NOTE — PROGRESS NOTES
1. Have you been to the ER, urgent care clinic since your last visit? Hospitalized since your last visit? No    2. Have you seen or consulted any other health care providers outside of the [address] [place] [address] since your last visit? Include any pap smears or colon screening.  No    Chief Complaint   Patient presents with    Post OP Follow Up     [facility]

## 2022-01-17 NOTE — PROGRESS NOTES
Patient: Baron Quinn   Age: 55 y.o. Patient Care Team:  Shirin Lentz MD as PCP - General (Internal Medicine)  Shirin Lentz MD as PCP - [facility]  Nancy Parnell DO (Cardiology)    Diagnosis: s/p AVR (mechanical valve)    Problem List:   Patient Active Problem List   Diagnosis Code    Aortic stenosis I35.0    S/P AVR (aortic valve replacement) Z95.2        Date of Surgery: 1/6/22     Surgery: SAVR    HPI:  Pt is here for post op follow up for mechanical AVR. Post-operative course was uneventful. He reports doing well at home. Denies chest pain, shortness of breath, dizziness, LE swelling. Denies problem with incision including redness, drainage, or openings. Appetite and energy levels back to baseline. Sleeping well. He has no other concerns at this time. Current Medications:   Current Outpatient Medications   Medication Sig Dispense Refill    aspirin 81 mg chewable tablet Take 1 Tablet by mouth daily. 30 Tablet 1    furosemide (LASIX) 20 mg tablet Take 1 Tablet by mouth daily. 3 Tablet 0    metoprolol tartrate (LOPRESSOR) 50 mg tablet Take 1 Tablet by mouth every twelve (12) hours. 60 Tablet 1    polyethylene glycol (MIRALAX) 17 gram packet Take 1 Packet by mouth daily. 14 Packet 0    senna-docusate (PERICOLACE) 8.6-50 mg per tablet Take 1 Tablet by mouth two (2) times a day. 60 Tablet 0    warfarin (COUMADIN) 5 mg tablet Take 2 Tablets by mouth every evening. Take 10 mg at 6pm on 1/11/22. [facility]. Dosing determined by Dr. Tamara Albright office. 60 Tablet 1    zinc 50 mg tab tablet Take 50 mg by mouth daily.  ascorbic acid, vitamin C, (Vitamin C) 500 mg tablet Take 500 mg by mouth daily. Vitals: Blood pressure (!) 142/64, pulse 84, temperature 97.8 °F (36.6 °C), temperature source Oral, resp. rate 18, height 5' 11\" (1.803 m), weight 200 lb 6.4 oz (90.9 kg), SpO2 94 %. Allergies: has No Known Allergies.     Physical Exam:  Wounds: clean, dry, no drainage    Lungs: clear to auscultation bilaterally    Heart: regular rate and rhythm, S1, S2 normal, no murmur. Mechanical click present. No rub or gallop    Extremities: normal strength, tone, and muscle mass    Assessment/Plan:   1. AS s/p AVR mechanical (on-x valve): On ASA, coumadin. INR goal 2-3 for 3 months and 1.5-2 thereafter. Last INR check 2.0 on 1/14. HH instructed to stop patient's lovenox injections and take 7.5mg of coumadin Fri, Sat, Sun. Today's INR recheck is 1.8. Patient instructed to alternate 10mg and 7.5mg of coumadin, starting with 10mg today. Recheck INR Thursday with HH.       2. HTN: cont metoprolol     3. RTC for 1 month follow up appointment.

## 2022-01-20 ENCOUNTER — TELEPHONE ANTICOAG (OUTPATIENT)
Dept: CARDIOTHORACIC SURGERY | Age: 47
End: 2022-01-20

## 2022-01-20 LAB — INR, EXTERNAL: 2.1

## 2022-01-24 ENCOUNTER — TELEPHONE ANTICOAG (OUTPATIENT)
Dept: CARDIOTHORACIC SURGERY | Age: 47
End: 2022-01-24

## 2022-01-24 LAB — INR, EXTERNAL: 2.4

## 2022-01-27 ENCOUNTER — TELEPHONE ANTICOAG (OUTPATIENT)
Dept: CARDIOTHORACIC SURGERY | Age: 47
End: 2022-01-27

## 2022-01-27 LAB — INR, EXTERNAL: 3.1

## 2022-01-31 ENCOUNTER — TELEPHONE ANTICOAG (OUTPATIENT)
Dept: CARDIOTHORACIC SURGERY | Age: 47
End: 2022-01-31

## 2022-01-31 ENCOUNTER — TELEPHONE (OUTPATIENT)
Dept: CARDIOLOGY CLINIC | Age: 47
End: 2022-01-31

## 2022-01-31 LAB — INR, EXTERNAL: 2

## 2022-02-02 ENCOUNTER — OFFICE VISIT (OUTPATIENT)
Dept: CARDIOTHORACIC SURGERY | Age: 47
End: 2022-02-02
Payer: COMMERCIAL

## 2022-02-02 ENCOUNTER — TELEPHONE ANTICOAG (OUTPATIENT)
Dept: CARDIOLOGY CLINIC | Age: 47
End: 2022-02-02

## 2022-02-02 ENCOUNTER — TELEPHONE ANTICOAG (OUTPATIENT)
Dept: CARDIOTHORACIC SURGERY | Age: 47
End: 2022-02-02

## 2022-02-02 VITALS
SYSTOLIC BLOOD PRESSURE: 136 MMHG | WEIGHT: 197.6 LBS | HEART RATE: 86 BPM | OXYGEN SATURATION: 96 % | TEMPERATURE: 98.2 F | DIASTOLIC BLOOD PRESSURE: 76 MMHG | BODY MASS INDEX: 27.66 KG/M2 | HEIGHT: 71 IN | RESPIRATION RATE: 16 BRPM

## 2022-02-02 DIAGNOSIS — Z95.2 S/P AVR (AORTIC VALVE REPLACEMENT): Primary | ICD-10-CM

## 2022-02-02 LAB — INR, EXTERNAL: 2.3

## 2022-02-02 PROCEDURE — 99024 POSTOP FOLLOW-UP VISIT: CPT | Performed by: PHYSICIAN ASSISTANT

## 2022-02-02 NOTE — PROGRESS NOTES
Patient: Tika Martin   Age: 55 y.o. Patient Care Team:  Kristen Art MD as PCP - General (Internal Medicine)  [facility] Art [facility]  Tamara Mcdonald DO (Cardiology)    Diagnosis: The encounter diagnosis was S/P AVR (aortic valve replacement). Problem List:   Patient Active Problem List   Diagnosis Code    Aortic stenosis I35.0    S/P AVR (aortic valve replacement) Z95.2     Date of Surgery: 1/6/22      Surgery: AVR    HPI:  Pt is here for post op follow up, seen with Dr. Fely Pablo. Feels well overall. A little sore around his chest incision. Ambulating. Current Medications:   Current Outpatient Medications   Medication Sig Dispense Refill    aspirin 81 mg chewable tablet Take 1 Tablet by mouth daily. 30 Tablet 1    furosemide (LASIX) 20 mg tablet Take 1 Tablet by mouth daily. (Patient not taking: Reported on 1/17/2022) 3 Tablet 0    metoprolol tartrate (LOPRESSOR) 50 mg tablet Take 1 Tablet by mouth every twelve (12) hours. 60 Tablet 1    polyethylene glycol (MIRALAX) 17 gram packet Take 1 Packet by mouth daily. (Patient not taking: Reported on 1/17/2022) 14 Packet 0    senna-docusate (PERICOLACE) 8.6-50 mg per tablet Take 1 Tablet by mouth two (2) times a day. (Patient not taking: Reported on 1/17/2022) 60 Tablet 0    warfarin (COUMADIN) 5 mg tablet Take 2 Tablets by mouth every evening. Take 10 mg at 6pm on 1/11/22. INR to be checked by [facility] nursing. Dosing determined by Dr. Jim Bae office. (Patient taking differently: Take 7.5 mg by mouth every evening. Take 10 mg at 6pm on 1/11/22. INR to be checked by [facility] nursing. Dosing determined by Dr. Jim Bae office.) 60 Tablet 1       Vitals: There were no vitals taken for this visit. Allergies: has No Known Allergies.     Physical Exam:  Wounds: clean, dry, no drainage    Lungs: clear to auscultation bilaterally    Heart: regular rate and rhythm, S1, S2 normal, no murmur, click, rub or gallop    Extremities: No edema    Assessment/Plan:   1. AS s/p AVR mechanical (on-x valve): On ASA, coumadin. INR goal 2-3 for 3 months and 1.5-2 thereafter.       2. HTN: cont metoprolol     Dispo: FU with cardiology, PCP    Pt is ready to start cardiac rehab.      Rehab - Y  Walking: y

## 2022-02-02 NOTE — PROGRESS NOTES
Chief Complaint   Patient presents with    Post OP Follow Up     1. Have you been to the ER, urgent care clinic since your last visit? Hospitalized since your last visit? No    2. Have you seen or consulted any other health care providers outside of the [facility] since your last visit? Include any pap smears or colon screening. No      PT/ INR obtained in office 2/2/22 @ 15:09 PT 2.7 INR 27.1.  PA notified

## 2022-02-08 ENCOUNTER — HOSPITAL ENCOUNTER (OUTPATIENT)
Dept: CARDIAC REHAB | Age: 47
Discharge: HOME | End: 2022-02-08
Payer: COMMERCIAL

## 2022-02-08 ENCOUNTER — TELEPHONE ANTICOAG (OUTPATIENT)
Dept: CARDIOTHORACIC SURGERY | Age: 47
End: 2022-02-08

## 2022-02-08 VITALS — BODY MASS INDEX: 27.77 KG/M2 | WEIGHT: 198.4 LBS | HEIGHT: 71 IN

## 2022-02-08 PROCEDURE — 93798 PHYS/QHP OP CAR RHAB W/ECG: CPT

## 2022-02-08 NOTE — CARDIO/PULMONARY
[facility]    Cardiac Rehabilitation Program    Intake Appointment  2022           NAME: Amber Martinez : 1975 AGE: 55 y.o. GENDER: male    CARDIAC REHAB ADMITTING DIAGNOSIS: Presence of other heart-valve replacement [Z95.4]    REFERRING PHYSICIAN: Loren Roche MD    MEDICAL HX:  Past Medical History:   Diagnosis Date    Aortic stenosis     Aortic valve stenosis        LABS:     Lab Results   Component Value Date/Time    Hemoglobin A1c 5.2 2021 01:07 PM     No results found for: CHOL, CHOLPOCT, CHOLX, CHLST, CHOLV, HDL, HDLPOC, HDLP, LDL, LDLCPOC, LDLC, DLDLP, VLDLC, VLDL, TGLX, TRIGL, TRIGP, TGLPOCT, CHHD, CHHDX      MEDICATIONS/SUPPLEMENTS:     Prior to Admission medications    Medication Sig Start Date End Date Taking? Authorizing Provider   aspirin 81 mg chewable tablet Take 1 Tablet by mouth daily. 22  Yes Charlene Hill NP   metoprolol tartrate (LOPRESSOR) 50 mg tablet Take 1 Tablet by mouth every twelve (12) hours. 22  Yes Charlene Hill NP   warfarin (COUMADIN) 5 mg tablet Take 2 Tablets by mouth every evening. Take 10 mg at 6pm on 22. INR to be checked by [facility] nursing. Dosing determined by Dr. Oral Dominguez office. Patient taking differently: Take 7.5 mg by mouth every evening. Take 10 mg at 6pm on 22. INR to be checked by [facility] nursing. Dosing determined by Dr. Oral Dominguez office. 22  Yes Charlene Hill NP   furosemide (LASIX) 20 mg tablet Take 1 Tablet by mouth daily. Patient not taking: Reported on 2022   Charlene Hill NP   polyethylene glycol [place]) 17 gram packet Take 1 Packet by mouth daily. Patient not taking: Reported on 2022   Charlene Hill NP   senna-docusate (PERICOLACE) 8.6-50 mg per tablet Take 1 Tablet by mouth two (2) times a day.   Patient not taking: Reported on 2022   Charlene Hill NP       ANTHROPOMETRICS:      Ht Readings from Last 1 Encounters:   22 5' 11\" (1.803 m)      Wt Readings from Last 1 Encounters:   02/08/22 90 kg (198 lb 6.4 oz)        WAIST: 37 (umbilical)    SCREENING SCORES:                       [facility]: 50.7  [facility]: 18  Rate Your Plate: 61  Cardiac Knowledge: 10  PHQ-9: 0       VISIT SUMMARY:    Keisha Nguyen 55 y.o. presented to [facility] Cardiac Rehab for program orientation and 6 minute walk test today with a primary diagnosis of Presence of other heart-valve replacement [Z95.4]. Keisha Nguyen has a history that includes: n/a. Cardiac risk factors include smoking/ tobacco exposure, family history. EF is  . Keisha Nguyen is (not)  and lives with his wife and kids. social hx. PHQ9, depression score, is  0 and this is considered mild. The result was discussed with patient who affirms score to be accurate. Patient denied chest pain or SOB during 6 minute walk test and was in SR/BBB/Inverted T. Patient walked 500 meters meters at a speed of 3.2 mph and grade of 0 % for a final MET level of 3.4. Exercise prescription developed around patient stated goals, to be supplemented with home exercise recommendations. Education manual given to patient and reviewed. Patient will attend cardiac rehab 3 times/week. Pt will be returning to work 3/23 and will discharge from [facility] 3/22.        Julian Vance RN

## 2022-02-09 ENCOUNTER — HOSPITAL ENCOUNTER (OUTPATIENT)
Dept: CARDIAC REHAB | Age: 47
Discharge: HOME | End: 2022-02-09
Payer: COMMERCIAL

## 2022-02-09 VITALS — WEIGHT: 198.6 LBS | BODY MASS INDEX: 27.7 KG/M2

## 2022-02-09 PROCEDURE — 93798 PHYS/QHP OP CAR RHAB W/ECG: CPT

## 2022-02-09 PROCEDURE — 93797 PHYS/QHP OP CAR RHAB WO ECG: CPT

## 2022-02-09 NOTE — CARDIO/PULMONARY
Cardiac Rehab Nutrition Assessment - 1:1 Evaluation         NAME: Stephanie Linton : 1975 AGE: 55 y.o. GENDER: male  CARDIAC REHAB ADMITTING DIAGNOSIS: s/p AVR (22)    PROBLEM LIST:  Patient Active Problem List   Diagnosis Code    Aortic stenosis I35.0    S/P AVR (aortic valve replacement) Z95.2       LABS:   Lab Results   Component Value Date/Time    Hemoglobin A1c 5.2 2021 01:07 PM     No results found for: CHOL, CHOLPOCT, CHOLX, CHLST, CHOLV, HDL, HDLPOC, HDLP, LDL, LDLCPOC, LDLC, DLDLP, VLDLC, VLDL, TGLX, TRIGL, TRIGP, TGLPOCT, CHHD, CHHDX      MEDICATIONS/SUPPLEMENTS:     Prior to Admission medications    Medication Sig Start Date End Date Taking? Authorizing Provider   aspirin 81 mg chewable tablet Take 1 Tablet by mouth daily. 22   Danii Bolanos NP   furosemide (LASIX) 20 mg tablet Take 1 Tablet by mouth daily. Patient not taking: Reported on 2022   Danii Bolanos NP   metoprolol tartrate (LOPRESSOR) 50 mg tablet Take 1 Tablet by mouth every twelve (12) hours. 22   Danii Bolanos NP   polyethylene glycol (MIRALAX) 17 gram packet Take 1 Packet by mouth daily. Patient not taking: Reported on 2022   Danii Bolanos NP   senna-docusate (PERICOLACE) 8.6-50 mg per tablet Take 1 Tablet by mouth two (2) times a day. Patient not taking: Reported on 2022   Danii Bolanos NP   warfarin (COUMADIN) 5 mg tablet Take 2 Tablets by mouth every evening. Take 10 mg at 6pm on 22. INR to be checked by [facility] nursing. Dosing determined by Dr. Becki Lawson office. Patient taking differently: Take 7.5 mg by mouth every evening. Take 10 mg at 6pm on 22. INR to be checked by [facility] nursing. Dosing determined by Dr. Becki Lawson office.  22   Danii Boalnos NP         ANTHROPOMETRICS:    Ht Readings from Last 1 Encounters:   22 5' 11\" (1.803 m)      Wt Readings from Last 10 Encounters:   22 90 kg (198 lb 6.4 oz)   22 89.6 kg (197 lb 9.6 oz)   01/17/22 90.9 kg (200 lb 6.4 oz)   01/11/22 93.8 kg (206 lb 12.7 oz)   12/15/21 95.1 kg (209 lb 9.6 oz)         BMI: 27.67 kg/M2 Category: overweight  Waist: 37 inches    Reported Wt Hx: Gained to 215# over the pandemic, now down to 198# post surgery    Reported Diet Hx: Monday started to make changes - loves fruits, vegetables, meat (red meat 2-3x/week)    Rate Your Plate Score:  (Score 58-72: Making many healthy choices; 41-57: Some choices need improving 24-40: many choices need improving)    24 HOUR DIET RECALL  Breakfast    Lunch Apple, can of tuna, handful of nuts    Dinner [place] ([facility]) meat, sauce, pasta    Snacks    Beverages      Ilir Ocampo     Environmental/Social: Works in delivery, so constantly on the go. Going back end of March. Running 5 miles/day before surgery. NUTRITION INTERVENTION:  Nutrition 30 / 60 minute one-on-one education & goal setting with Yash Quintero    Reviewed with Yash Quintero relevant labs compared to ideals. Reviewed weight history and patient's verbalized weight goal as well as any real or perceived barriers to obtaining the goal. Collaborated with patient to set a specific short and long term weight goal.     Reviewed Rate Your Plate and conducted a verbal diet recall. Assessed for environmental, financial, psychosocial, physical and comorbidities that may impact the food and eating patterns / behaviors of Lawson's with patient to set specific nutrient goals as well as specific food / behavior changes that will help patient meet the overall goal of following a heart healthy eating pattern (using guidelines as set forth by the American Heart Association and modeled after healthful eating patterns as recognized by the USDA Dietary Guidelines such as DASH, Mediterranean or plant-based).     Briefly reviewed with Yash Quintero the nutrition information in the Cardiac Rehab patient education book and encouraged Patricio Johnston Tiana Corado to read thoroughly, ask questions as needed, and use for future reference for heart healthy nutrition information. Mary Ma is / is not scheduled to participate in Cardiac Rehab group nutrition classes. PATIENT GOALS:    Weight Goals: 185#    Nutrition Goals:  Daily Recommendations:  Calories: 2200 /day  (using [address] with AF 1.5-500)    Saturated Fat: no more than 22 g/day  Trans Fat: 0 g/day  Sodium: no more than 2300 mg/day  Fruit: 2 cups / day  Vegetables: 2-3 cups/day    Other:  - read and compare food labels  - start eating breakfast daily with protein (shake, yogurt, hard boiled eggs, oatmeal)  - lean protein with each meal      Keeping a food diary was recommended. Questions addressed. Follow-up plans discussed. Mary Ma verbalized understanding.             Rabia Argueta, SABRINA

## 2022-02-11 ENCOUNTER — HOSPITAL ENCOUNTER (OUTPATIENT)
Dept: CARDIAC REHAB | Age: 47
Discharge: HOME | End: 2022-02-11
Payer: COMMERCIAL

## 2022-02-11 VITALS — WEIGHT: 195 LBS | BODY MASS INDEX: 27.2 KG/M2

## 2022-02-11 PROCEDURE — 93798 PHYS/QHP OP CAR RHAB W/ECG: CPT

## 2022-02-15 ENCOUNTER — HOSPITAL ENCOUNTER (OUTPATIENT)
Dept: CARDIAC REHAB | Age: 47
Discharge: HOME | End: 2022-02-15
Payer: COMMERCIAL

## 2022-02-15 VITALS — WEIGHT: 198 LBS | BODY MASS INDEX: 27.62 KG/M2

## 2022-02-15 PROCEDURE — 93798 PHYS/QHP OP CAR RHAB W/ECG: CPT

## 2022-02-16 ENCOUNTER — HOSPITAL ENCOUNTER (OUTPATIENT)
Dept: CARDIAC REHAB | Age: 47
Discharge: HOME | End: 2022-02-16
Payer: COMMERCIAL

## 2022-02-16 VITALS — WEIGHT: 199 LBS | BODY MASS INDEX: 27.75 KG/M2

## 2022-02-16 PROCEDURE — 93798 PHYS/QHP OP CAR RHAB W/ECG: CPT

## 2022-02-18 ENCOUNTER — HOSPITAL ENCOUNTER (OUTPATIENT)
Dept: CARDIAC REHAB | Age: 47
Discharge: HOME | End: 2022-02-18
Payer: COMMERCIAL

## 2022-02-18 VITALS — BODY MASS INDEX: 27.48 KG/M2 | WEIGHT: 197 LBS

## 2022-02-18 PROCEDURE — 93798 PHYS/QHP OP CAR RHAB W/ECG: CPT

## 2022-02-22 ENCOUNTER — HOSPITAL ENCOUNTER (OUTPATIENT)
Dept: CARDIAC REHAB | Age: 47
Discharge: HOME | End: 2022-02-22
Payer: COMMERCIAL

## 2022-02-22 VITALS — BODY MASS INDEX: 27.89 KG/M2 | WEIGHT: 200 LBS

## 2022-02-22 PROCEDURE — 93798 PHYS/QHP OP CAR RHAB W/ECG: CPT

## 2022-02-23 ENCOUNTER — HOSPITAL ENCOUNTER (OUTPATIENT)
Dept: CARDIAC REHAB | Age: 47
Discharge: HOME | End: 2022-02-23
Payer: COMMERCIAL

## 2022-02-23 VITALS — BODY MASS INDEX: 28.03 KG/M2 | WEIGHT: 201 LBS

## 2022-02-23 PROCEDURE — 93798 PHYS/QHP OP CAR RHAB W/ECG: CPT

## 2022-02-25 ENCOUNTER — HOSPITAL ENCOUNTER (OUTPATIENT)
Dept: CARDIAC REHAB | Age: 47
Discharge: HOME | End: 2022-02-25
Payer: COMMERCIAL

## 2022-02-25 VITALS — WEIGHT: 200 LBS | BODY MASS INDEX: 27.89 KG/M2

## 2022-02-25 PROCEDURE — 93798 PHYS/QHP OP CAR RHAB W/ECG: CPT

## 2022-03-01 ENCOUNTER — HOSPITAL ENCOUNTER (OUTPATIENT)
Dept: CARDIAC REHAB | Age: 47
Discharge: HOME | End: 2022-03-01
Payer: COMMERCIAL

## 2022-03-01 VITALS — BODY MASS INDEX: 27.89 KG/M2 | WEIGHT: 200 LBS

## 2022-03-01 PROCEDURE — 93798 PHYS/QHP OP CAR RHAB W/ECG: CPT

## 2022-03-02 ENCOUNTER — HOSPITAL ENCOUNTER (OUTPATIENT)
Dept: CARDIAC REHAB | Age: 47
Discharge: HOME | End: 2022-03-02
Payer: COMMERCIAL

## 2022-03-02 VITALS — BODY MASS INDEX: 27.89 KG/M2 | WEIGHT: 200 LBS

## 2022-03-02 PROCEDURE — 93798 PHYS/QHP OP CAR RHAB W/ECG: CPT

## 2022-03-04 ENCOUNTER — HOSPITAL ENCOUNTER (OUTPATIENT)
Dept: CARDIAC REHAB | Age: 47
Discharge: HOME | End: 2022-03-04
Payer: COMMERCIAL

## 2022-03-04 VITALS — WEIGHT: 200 LBS | BODY MASS INDEX: 27.89 KG/M2

## 2022-03-04 PROCEDURE — 93798 PHYS/QHP OP CAR RHAB W/ECG: CPT

## 2022-03-08 ENCOUNTER — HOSPITAL ENCOUNTER (OUTPATIENT)
Dept: CARDIAC REHAB | Age: 47
Discharge: HOME | End: 2022-03-08
Payer: COMMERCIAL

## 2022-03-08 VITALS — WEIGHT: 200 LBS | BODY MASS INDEX: 27.89 KG/M2

## 2022-03-08 PROCEDURE — 93798 PHYS/QHP OP CAR RHAB W/ECG: CPT

## 2022-03-09 ENCOUNTER — HOSPITAL ENCOUNTER (OUTPATIENT)
Dept: CARDIAC REHAB | Age: 47
Discharge: HOME | End: 2022-03-09
Payer: COMMERCIAL

## 2022-03-09 VITALS — BODY MASS INDEX: 27.89 KG/M2 | WEIGHT: 200 LBS

## 2022-03-09 PROCEDURE — 93798 PHYS/QHP OP CAR RHAB W/ECG: CPT

## 2022-03-11 ENCOUNTER — HOSPITAL ENCOUNTER (OUTPATIENT)
Dept: CARDIAC REHAB | Age: 47
Discharge: HOME | End: 2022-03-11
Payer: COMMERCIAL

## 2022-03-11 VITALS — WEIGHT: 200 LBS | BODY MASS INDEX: 27.89 KG/M2

## 2022-03-11 PROCEDURE — 93798 PHYS/QHP OP CAR RHAB W/ECG: CPT

## 2022-03-15 ENCOUNTER — HOSPITAL ENCOUNTER (OUTPATIENT)
Dept: CARDIAC REHAB | Age: 47
Discharge: HOME | End: 2022-03-15
Payer: COMMERCIAL

## 2022-03-15 VITALS — BODY MASS INDEX: 27.89 KG/M2 | WEIGHT: 200 LBS

## 2022-03-15 PROCEDURE — 93798 PHYS/QHP OP CAR RHAB W/ECG: CPT

## 2022-03-16 ENCOUNTER — TELEPHONE (OUTPATIENT)
Dept: CARDIOTHORACIC SURGERY | Age: 47
End: 2022-03-16

## 2022-03-16 ENCOUNTER — HOSPITAL ENCOUNTER (OUTPATIENT)
Dept: CARDIAC REHAB | Age: 47
Discharge: HOME | End: 2022-03-16
Payer: COMMERCIAL

## 2022-03-16 VITALS — WEIGHT: 200 LBS | BODY MASS INDEX: 27.89 KG/M2

## 2022-03-16 PROCEDURE — 93798 PHYS/QHP OP CAR RHAB W/ECG: CPT

## 2022-03-16 NOTE — TELEPHONE ENCOUNTER
Mr. Jered Ann came to office with return to work issue. [facility] paperwork already completed and signed but states employer is misunderstanding instructions. Wrote note to office explaining [facility] 83 paperwork for employer. To Whom it may concern,     Mr. Amber Martinez is able to return to work on light duty on 03/01/2022, 8 weeks following his surgery on 01/06/2022. Light duty includes refraining from lifting, pushing, or pulling greater than 15lbs. or weight-bearing activity that is not performed close to the body, ie. reaching/ pulling. Mr. Jered Ann is to remain on light duty until he is cleared by Cardiac Rehabilitation, approximately 12 weeks following his surgery or 4/6/2022. This information is concurrent with the [facility] paperwork completed by this office. Please feel free to contact us with any questions or concerns. Neri Goodson, RN, MSN, VIA [facility]  [facility]  [address] [place] [address].  [address] [place] [address], [address] [place]  P: [phone]   F: [phone]

## 2022-03-16 NOTE — CARDIO/PULMONARY
Patient had a BBB on [employer] which was new to us. He has previously done this in the past so did not fax strips.

## 2022-03-18 ENCOUNTER — HOSPITAL ENCOUNTER (OUTPATIENT)
Dept: CARDIAC REHAB | Age: 47
Discharge: HOME | End: 2022-03-18
Payer: COMMERCIAL

## 2022-03-18 VITALS — WEIGHT: 200 LBS | BODY MASS INDEX: 27.89 KG/M2

## 2022-03-18 PROCEDURE — 93798 PHYS/QHP OP CAR RHAB W/ECG: CPT

## 2022-03-18 NOTE — CARDIO/PULMONARY
Yudy Frank  Completed phase II cardiac rehab and attended 36   sessions from 2/8/22-3/18/22. Yudy Frank is interested in maintaining optimal health and will work with Dr. Ronaldo Reyes MD. Yudy Frank has improved his endurance and stamina through regular exercise, lost 1 inch from his waist. Blood pressure is 147/86 and is not WNL. He has also improved his [employer] and [employer] scores and these were reviewed with patient. Yudy Frank plans to continue exercising at the gym for at least 30min, 5x/wk.      Cole Matos RN  3/18/2022

## 2022-03-19 PROBLEM — Z95.2 S/P AVR (AORTIC VALVE REPLACEMENT): Status: ACTIVE | Noted: 2022-01-06

## 2022-03-19 PROBLEM — I35.0 AORTIC STENOSIS: Status: ACTIVE | Noted: 2021-12-29

## 2022-03-22 ENCOUNTER — APPOINTMENT (OUTPATIENT)
Dept: CARDIAC REHAB | Age: 47
End: 2022-03-22
Payer: COMMERCIAL

## 2024-01-04 ENCOUNTER — OFFICE VISIT (OUTPATIENT)
Age: 49
End: 2024-01-04

## 2024-01-04 DIAGNOSIS — Z95.2 S/P AVR (AORTIC VALVE REPLACEMENT): Primary | ICD-10-CM

## 2024-01-05 NOTE — PROGRESS NOTES
Pt walked into the office to say that he had a wire poking out that he cut. He had a picture of a pacing wire. Wire no longer protruding, site minimally red. Dr. Campos assessed. Plan for pt to RTC next week to reassess pacing wire site. Pt is s/p AVR on coumadin.

## 2024-01-10 ENCOUNTER — OFFICE VISIT (OUTPATIENT)
Age: 49
End: 2024-01-10

## 2024-01-10 VITALS
BODY MASS INDEX: 28.03 KG/M2 | TEMPERATURE: 97.2 F | WEIGHT: 201 LBS | RESPIRATION RATE: 17 BRPM | DIASTOLIC BLOOD PRESSURE: 80 MMHG | OXYGEN SATURATION: 93 % | HEART RATE: 55 BPM | SYSTOLIC BLOOD PRESSURE: 140 MMHG

## 2024-01-10 DIAGNOSIS — T81.9XXD: Primary | ICD-10-CM

## 2024-01-10 PROCEDURE — 99024 POSTOP FOLLOW-UP VISIT: CPT | Performed by: NURSE PRACTITIONER

## 2024-01-10 RX ORDER — WARFARIN SODIUM 7.5 MG/1
7.5 TABLET ORAL EVERY OTHER DAY
COMMUNITY

## 2024-01-10 NOTE — PROGRESS NOTES
Justin Castillo reported today for the following:    Chief Complaint   Patient presents with    Surgical Consult    Other     Epicardial pacer wire check       Reviewed record in preparation for visit and have obtained necessary documentation. Identified pt with two pt identifiers (name and ).     1. Have you been to the ER, urgent care clinic since your last visit?  Hospitalized since your last visit?No    2. Have you seen or consulted any other health care providers outside of the [facility] since your last visit?  Include any pap smears or colon screening. No    BP (!) 140/80   Pulse 55   Temp 97.2 °F (36.2 °C) (Oral)   Resp 17   Wt 91.2 kg (201 lb)   SpO2 93%   BMI 28.03 kg/m²     Medications reviewed/approved by provider.    No results found for this visit on 01/10/24.         Donna Guerin RN    
lbs in the past 6 months which is probably why the wire protruded through the skin.       Antibiotic card for valves: yes.    
Well developed

## 2024-08-09 ENCOUNTER — HOSPITAL ENCOUNTER (INPATIENT)
Facility: HOSPITAL | Age: 49
LOS: 3 days | Discharge: HOME | DRG: 379 | End: 2024-08-12
Admitting: INTERNAL MEDICINE
Payer: COMMERCIAL

## 2024-08-09 ENCOUNTER — APPOINTMENT (OUTPATIENT)
Facility: HOSPITAL | Age: 49
DRG: 379 | End: 2024-08-09
Payer: COMMERCIAL

## 2024-08-09 DIAGNOSIS — K92.2 UPPER GI BLEED: Primary | ICD-10-CM

## 2024-08-09 LAB
ALBUMIN SERPL-MCNC: 4.7 G/DL (ref 3.5–5)
ALBUMIN/GLOB SERPL: 1.2 (ref 1.1–2.2)
ALP SERPL-CCNC: 68 U/L (ref 45–117)
ALT SERPL-CCNC: 302 U/L (ref 12–78)
ANION GAP SERPL CALC-SCNC: 8 MMOL/L (ref 5–15)
AST SERPL-CCNC: 452 U/L (ref 15–37)
BASOPHILS # BLD: 0.1 K/UL (ref 0–0.1)
BASOPHILS NFR BLD: 1 % (ref 0–1)
BILIRUB SERPL-MCNC: 0.8 MG/DL (ref 0.2–1)
BUN SERPL-MCNC: 20 MG/DL (ref 6–20)
BUN/CREAT SERPL: 16 (ref 12–20)
CALCIUM SERPL-MCNC: 10.4 MG/DL (ref 8.5–10.1)
CHLORIDE SERPL-SCNC: 100 MMOL/L (ref 97–108)
CO2 SERPL-SCNC: 29 MMOL/L (ref 21–32)
CREAT SERPL-MCNC: 1.26 MG/DL (ref 0.7–1.3)
DIFFERENTIAL METHOD BLD: ABNORMAL
EOSINOPHIL # BLD: 0.1 K/UL (ref 0–0.4)
EOSINOPHIL NFR BLD: 1 % (ref 0–7)
ERYTHROCYTE [DISTWIDTH] IN BLOOD BY AUTOMATED COUNT: 12.6 % (ref 11.5–14.5)
GLOBULIN SER CALC-MCNC: 3.8 G/DL (ref 2–4)
GLUCOSE SERPL-MCNC: 138 MG/DL (ref 65–100)
HCT VFR BLD AUTO: 37.1 % (ref 36.6–50.3)
HCT VFR BLD AUTO: 39.3 % (ref 36.6–50.3)
HCT VFR BLD AUTO: 43.2 % (ref 36.6–50.3)
HGB BLD-MCNC: 12.9 G/DL (ref 12.1–17)
HGB BLD-MCNC: 13.5 G/DL (ref 12.1–17)
HGB BLD-MCNC: 15.1 G/DL (ref 12.1–17)
IMM GRANULOCYTES # BLD AUTO: 0 K/UL (ref 0–0.04)
IMM GRANULOCYTES NFR BLD AUTO: 0 % (ref 0–0.5)
INR PPP: 10.1 (ref 0.9–1.1)
LACTATE BLD-SCNC: 0.84 MMOL/L (ref 0.4–2)
LIPASE SERPL-CCNC: 39 U/L (ref 13–75)
LYMPHOCYTES # BLD: 0.8 K/UL (ref 0.8–3.5)
LYMPHOCYTES NFR BLD: 13 % (ref 12–49)
MCH RBC QN AUTO: 32.8 PG (ref 26–34)
MCHC RBC AUTO-ENTMCNC: 35 G/DL (ref 30–36.5)
MCV RBC AUTO: 93.9 FL (ref 80–99)
MONOCYTES # BLD: 0.4 K/UL (ref 0–1)
MONOCYTES NFR BLD: 7 % (ref 5–13)
NEUTS SEG # BLD: 4.7 K/UL (ref 1.8–8)
NEUTS SEG NFR BLD: 78 % (ref 32–75)
NRBC # BLD: 0 K/UL (ref 0–0.01)
NRBC BLD-RTO: 0 PER 100 WBC
PLATELET # BLD AUTO: 239 K/UL (ref 150–400)
PMV BLD AUTO: 10.9 FL (ref 8.9–12.9)
POTASSIUM SERPL-SCNC: 3.9 MMOL/L (ref 3.5–5.1)
PROT SERPL-MCNC: 8.5 G/DL (ref 6.4–8.2)
PROTHROMBIN TIME: 91.5 SEC (ref 9–11.1)
RBC # BLD AUTO: 4.6 M/UL (ref 4.1–5.7)
SODIUM SERPL-SCNC: 137 MMOL/L (ref 136–145)
WBC # BLD AUTO: 6.1 K/UL (ref 4.1–11.1)

## 2024-08-09 PROCEDURE — 6360000002 HC RX W HCPCS

## 2024-08-09 PROCEDURE — 96365 THER/PROPH/DIAG IV INF INIT: CPT

## 2024-08-09 PROCEDURE — 80053 COMPREHEN METABOLIC PANEL: CPT

## 2024-08-09 PROCEDURE — 6370000000 HC RX 637 (ALT 250 FOR IP): Performed by: INTERNAL MEDICINE

## 2024-08-09 PROCEDURE — 85025 COMPLETE CBC W/AUTO DIFF WBC: CPT

## 2024-08-09 PROCEDURE — 2580000003 HC RX 258

## 2024-08-09 PROCEDURE — 96366 THER/PROPH/DIAG IV INF ADDON: CPT

## 2024-08-09 PROCEDURE — 6360000004 HC RX CONTRAST MEDICATION

## 2024-08-09 PROCEDURE — 74178 CT ABD&PLV WO CNTR FLWD CNTR: CPT

## 2024-08-09 PROCEDURE — 99285 EMERGENCY DEPT VISIT HI MDM: CPT

## 2024-08-09 PROCEDURE — 85610 PROTHROMBIN TIME: CPT

## 2024-08-09 PROCEDURE — 6360000002 HC RX W HCPCS: Performed by: INTERNAL MEDICINE

## 2024-08-09 PROCEDURE — 83690 ASSAY OF LIPASE: CPT

## 2024-08-09 PROCEDURE — 36415 COLL VENOUS BLD VENIPUNCTURE: CPT

## 2024-08-09 PROCEDURE — 85014 HEMATOCRIT: CPT

## 2024-08-09 PROCEDURE — 83605 ASSAY OF LACTIC ACID: CPT

## 2024-08-09 PROCEDURE — 2060000000 HC ICU INTERMEDIATE R&B

## 2024-08-09 PROCEDURE — 2580000003 HC RX 258: Performed by: INTERNAL MEDICINE

## 2024-08-09 PROCEDURE — 96375 TX/PRO/DX INJ NEW DRUG ADDON: CPT

## 2024-08-09 PROCEDURE — 85018 HEMOGLOBIN: CPT

## 2024-08-09 RX ORDER — ACETAMINOPHEN 650 MG/1
650 SUPPOSITORY RECTAL EVERY 6 HOURS PRN
Status: DISCONTINUED | OUTPATIENT
Start: 2024-08-09 | End: 2024-08-12 | Stop reason: HOSPADM

## 2024-08-09 RX ORDER — DIAZEPAM 5 MG/ML
10 INJECTION, SOLUTION INTRAMUSCULAR; INTRAVENOUS
Status: DISCONTINUED | OUTPATIENT
Start: 2024-08-09 | End: 2024-08-12 | Stop reason: HOSPADM

## 2024-08-09 RX ORDER — SODIUM CHLORIDE 9 MG/ML
INJECTION, SOLUTION INTRAVENOUS PRN
Status: DISCONTINUED | OUTPATIENT
Start: 2024-08-09 | End: 2024-08-12 | Stop reason: HOSPADM

## 2024-08-09 RX ORDER — DIAZEPAM 5 MG/1
20 TABLET ORAL
Status: DISCONTINUED | OUTPATIENT
Start: 2024-08-09 | End: 2024-08-12 | Stop reason: HOSPADM

## 2024-08-09 RX ORDER — DIAZEPAM 5 MG/ML
5 INJECTION, SOLUTION INTRAMUSCULAR; INTRAVENOUS
Status: DISCONTINUED | OUTPATIENT
Start: 2024-08-09 | End: 2024-08-12 | Stop reason: HOSPADM

## 2024-08-09 RX ORDER — DIAZEPAM 5 MG/1
10 TABLET ORAL
Status: DISCONTINUED | OUTPATIENT
Start: 2024-08-09 | End: 2024-08-12 | Stop reason: HOSPADM

## 2024-08-09 RX ORDER — OCTREOTIDE ACETATE 50 UG/ML
50 INJECTION, SOLUTION INTRAVENOUS; SUBCUTANEOUS ONCE
Status: COMPLETED | OUTPATIENT
Start: 2024-08-09 | End: 2024-08-09

## 2024-08-09 RX ORDER — LANOLIN ALCOHOL/MO/W.PET/CERES
6 CREAM (GRAM) TOPICAL NIGHTLY PRN
Status: DISCONTINUED | OUTPATIENT
Start: 2024-08-09 | End: 2024-08-12 | Stop reason: HOSPADM

## 2024-08-09 RX ORDER — DIAZEPAM 5 MG/1
5 TABLET ORAL
Status: DISCONTINUED | OUTPATIENT
Start: 2024-08-09 | End: 2024-08-12 | Stop reason: HOSPADM

## 2024-08-09 RX ORDER — SODIUM CHLORIDE 0.9 % (FLUSH) 0.9 %
5-40 SYRINGE (ML) INJECTION PRN
Status: DISCONTINUED | OUTPATIENT
Start: 2024-08-09 | End: 2024-08-12 | Stop reason: HOSPADM

## 2024-08-09 RX ORDER — DIAZEPAM 5 MG/ML
15 INJECTION, SOLUTION INTRAMUSCULAR; INTRAVENOUS
Status: DISCONTINUED | OUTPATIENT
Start: 2024-08-09 | End: 2024-08-12 | Stop reason: HOSPADM

## 2024-08-09 RX ORDER — ACETAMINOPHEN 325 MG/1
650 TABLET ORAL EVERY 6 HOURS PRN
Status: DISCONTINUED | OUTPATIENT
Start: 2024-08-09 | End: 2024-08-12 | Stop reason: HOSPADM

## 2024-08-09 RX ORDER — ACETAMINOPHEN 325 MG/1
650 TABLET ORAL EVERY 4 HOURS PRN
Status: DISCONTINUED | OUTPATIENT
Start: 2024-08-09 | End: 2024-08-12 | Stop reason: HOSPADM

## 2024-08-09 RX ORDER — ONDANSETRON 4 MG/1
4 TABLET, ORALLY DISINTEGRATING ORAL EVERY 8 HOURS PRN
Status: DISCONTINUED | OUTPATIENT
Start: 2024-08-09 | End: 2024-08-12 | Stop reason: HOSPADM

## 2024-08-09 RX ORDER — ONDANSETRON 2 MG/ML
4 INJECTION INTRAMUSCULAR; INTRAVENOUS EVERY 6 HOURS PRN
Status: DISCONTINUED | OUTPATIENT
Start: 2024-08-09 | End: 2024-08-12 | Stop reason: HOSPADM

## 2024-08-09 RX ORDER — DIAZEPAM 5 MG/1
15 TABLET ORAL
Status: DISCONTINUED | OUTPATIENT
Start: 2024-08-09 | End: 2024-08-12 | Stop reason: HOSPADM

## 2024-08-09 RX ORDER — DIAZEPAM 5 MG/ML
20 INJECTION, SOLUTION INTRAMUSCULAR; INTRAVENOUS
Status: DISCONTINUED | OUTPATIENT
Start: 2024-08-09 | End: 2024-08-12 | Stop reason: HOSPADM

## 2024-08-09 RX ORDER — ONDANSETRON 2 MG/ML
4 INJECTION INTRAMUSCULAR; INTRAVENOUS EVERY 4 HOURS PRN
Status: DISCONTINUED | OUTPATIENT
Start: 2024-08-09 | End: 2024-08-09

## 2024-08-09 RX ORDER — SODIUM CHLORIDE 0.9 % (FLUSH) 0.9 %
5-40 SYRINGE (ML) INJECTION EVERY 12 HOURS SCHEDULED
Status: DISCONTINUED | OUTPATIENT
Start: 2024-08-09 | End: 2024-08-12 | Stop reason: HOSPADM

## 2024-08-09 RX ADMIN — MELATONIN 6 MG: at 21:40

## 2024-08-09 RX ADMIN — IOPAMIDOL 100 ML: 755 INJECTION, SOLUTION INTRAVENOUS at 09:09

## 2024-08-09 RX ADMIN — OCTREOTIDE ACETATE 50 MCG: 50 INJECTION, SOLUTION INTRAVENOUS; SUBCUTANEOUS at 14:10

## 2024-08-09 RX ADMIN — PANTOPRAZOLE SODIUM 40 MG: 40 INJECTION, POWDER, FOR SOLUTION INTRAVENOUS at 17:12

## 2024-08-09 RX ADMIN — PANTOPRAZOLE SODIUM 80 MG: 40 INJECTION, POWDER, FOR SOLUTION INTRAVENOUS at 09:22

## 2024-08-09 RX ADMIN — OCTREOTIDE ACETATE 50 MCG/HR: 500 INJECTION, SOLUTION INTRAVENOUS; SUBCUTANEOUS at 14:09

## 2024-08-09 RX ADMIN — OCTREOTIDE ACETATE 50 MCG/HR: 500 INJECTION, SOLUTION INTRAVENOUS; SUBCUTANEOUS at 14:08

## 2024-08-09 RX ADMIN — CEFTRIAXONE SODIUM 2000 MG: 2 INJECTION, POWDER, FOR SOLUTION INTRAMUSCULAR; INTRAVENOUS at 09:22

## 2024-08-09 NOTE — CONSULTS
I reviewed the patient's medical history, the findings on physical examination, the patient's diagnoses, and treatment plan as documented in the note.  I concur with the treatment plan as documented.  Additional suggestions noted.    Suspect spontaneous bleeding in the setting of very high/supratherapeutic INR. Reasonable to continue medical therapy for now and we can pursue endoscopy once the INR is within a reasonable range.     Partners available over the weekend upon request. Dr. Robles to assume care on 24.     JURGEN Layne D.O.  [facility]      GI CONSULTATION NOTE  Nba Sorto PA-C  [phone] NP in-hospital cell phone M-F until 4:30  After 5pm or on weekends, please call  for physician on call    NAME: Justin Castillo   :  1975   MRN:  [id number]   Attending: Monica Trivedi GI: Roverto   Date/Time:  2024 12:18 PM  Assessment:   Hematemesis  Supratheraputic INR on warfarin s/p Aortic valve replacement  EtOH AZ  VSS  HgB 15.1 POA  BUNCr 20:1.26  MCV 93.9  , , ALP 68. T bili 0.8. Lipase nl.  INR 10  CT AP w/   No identified gastrointestinal hemorrhage. Hepatic steatosis.  + warfarin, + Fhx of esophageal cancer in father    Scope Hx  EGD/CLN never   Plan:   Certainly concerning for hematemesis in setting of heavy EtOH, although HgB, BUN, and vitals all stable. Lower suspicion for EV vs esophagitis/gastritis/MWT but will presumptively assume EV bleed given INR precludes EGD eval at this time  PPI BID  Octreotide 50 mcg/hr  Continue ceft as doing.  NPO at least until HgB recheck.  EtOH cessation, CIWA protocol.  Supportive measures per primary team. Monitor for further S&S of GI bleed  Serial H&H as clinically indicated. Goal hemoglobin >7  Avoid NSAIDs  Will continue to follow. Please call GI with any further questions or concerns. Thank you!  We will continue to follow and trend INR to determine timing for EGD to evaluate. Pt will need

## 2024-08-09 NOTE — CARE COORDINATION
Care Management Initial Assessment       RUR: n/a, inpatient admit  Readmission? No  1st IM letter given? N/A, commercial insurance  1st  letter given: N/A, not /VA affiliated    Initial Assessment: Chart reviewed. CM met with pt at bedside, introduced role and confirmed demographic information in the chart - reviewed full code and next of kin: wife, Nisha Castillo [phone].  Pt and wife (2 dependent children) reside in a single story home with 2 [facility]. Pt performs ADL independently without device - pt is working full time and active . Pt drove self to hospital and plans on transporting self at discharge.  PCP: Dr. PAUL Aviles (seen 2 years ago), Cardiology: Dr. Ramos.  Pharmacy of choice: [facility] at [address] [place] [address] [phone].  No prior history of HH/SNF/IPR.   DME: none  CM will continue to follow for discharge planning. Full assessment below:     08/09/24 1145   Service Assessment   Patient Orientation Alert and Oriented;Person;Place;Situation;Self   Cognition Alert   History Provided By Patient   Primary Caregiver Self   Support Systems Spouse/Significant Other;Family Members;Friends/Neighbors   Patient's Healthcare Decision Maker is: Legal Next of Kin   PCP Verified by CM Yes   Last Visit to PCP Within last two years   Prior Functional Level Independent in ADLs/IADLs   Current Functional Level Independent in ADLs/IADLs   Can patient return to prior living arrangement Yes   Ability to make needs known: Good   Family able to assist with home care needs: Yes   Would you like for me to discuss the discharge plan with any other family members/significant others, and if so, who? No  (pt drove self to ED and plans on driving self home at discharge from hospital)   Financial Resources Other (Comment)  ([place] of [place]   Community Resources None   CM/SW Referral   (none at present time)   Social/Functional History   Lives With Spouse  (2 dependent children)   Home  Layout One level   Home Access Stairs to enter with rails   Entrance Stairs - Number of Steps 2   Home Equipment None   Receives Help From Family   ADL Assistance Independent   Homemaking Assistance Independent   Ambulation Assistance Independent   Transfer Assistance Independent   Active  Yes   Occupation Full time employment   Discharge Planning   Type of Residence House   Living Arrangements Spouse/Significant Other;Children  (dependent children)   Current Services Prior To Admission None   Potential Assistance Needed N/A   DME Ordered? No   Potential Assistance Purchasing Medications No   Type of Home Care Services None   Patient expects to be discharged to: House   One/Two Story Residence One story   History of falls? 0   Services At/After Discharge    Resource Information Provided? No  (not /VA affilated)     Advance Care Planning     General Advance Care Planning (ACP) Conversation    Date of Conversation: 8/9/2024  Conducted with: Patient with Decision Making Capacity  Other persons present: None    Healthcare Decision Maker: No healthcare decision makers have been documented.     Today we documented Decision Maker(s) consistent with Legal Next of Kin hierarchy.  Content/Action Overview:  DECLINED ACP Conversation - will revisit periodically  Reviewed DNR/DNI and patient elects Full Code (Attempt Resuscitation)    Length of Voluntary ACP Conversation in minutes:  <16 minutes (Non-Billable)    Brenda Throckmorton RN  [facility] Case Management    [phone]

## 2024-08-09 NOTE — CONSULTS
Cardiology Consult Note    CC: Hematemesis     PCP:Shay Aviles MD  Reason for consult:  [facility] AoVR  Requesting MD:  Dr. Solo  Admit Date: 8/9/2024   Today's Date: 8/9/2024   Cardiologist:  Dr Ramos.   Cardiac Assessment/Plan:   1.  Severe AoS s/p 25mm On-X mechanical AVR (Goal INR 1.5-2) 1/22.  EF normal.  Had very calcified MV at the time of surgery and had debridement done from AMVL.  2.  Former smoker; Q '07  3. H/o LBBB since 2022.  4. Elevated BP w/o Dx HTN    Admitted after hematemesis; normal Hg.  INR 10. Now w/ melena    Rec: Would let INR drift down unless active bleeding felt to be present; If needed, would use FFR NOT vit K to lower INR; Restart coumadin when INR around 2.    If BP remains high, would add ARB.    We are available over weekend as needed. Please call if questions/issues. Thanks.      Hospital Problem List:  Principal Problem:    GI bleed  Active Problems:    GI bleeding  Resolved Problems:    * No resolved hospital problems. *     ____________________________________________________________________  Justin Castillo is a 48 y.o. male presents with GI bleeding [K92.2]  GI bleed [K92.2]  Upper GI bleed [K92.2].    As noted in H&P: \"48 y.o.  male w/ EtOH AZ, s/p Aortic valve replacement on warfarin who we are asked to see for complaint of hematemesis yesterday and today and melena. He has no hx of GI bleeding before. States he drinks 3-4 beers a day and has mostly just drank alcohol the past few days. Last drink 6:30 yesterday. At which point he vomited large volume bright red blood. States he saw blood with initial emesis. Also w/ melena this AM. NO AP, change in bowel habits otherwise, or GERD. No smoking. Denies wt loss but does endorse Fhx of father w/ esophageal cancer. No DM medications, PPI therapy, NSAIDs. Warfarin +. NO hx of EGD or CLN in past. \"    ______________________________________________________________________    The patient reports no chest  filed at 8/9/2024 1159  Gross per 24 hour   Intake 50 ml   Output --   Net 50 ml         Physical Exam (complete single organ system exam)    Cons: The patient is no distress. Appears stated age.   HEENT: Normal conjunctivae and palate. No xanthelasma.  Neck: Flat JVP without appreciable HJR.  Resp: Normal respiratory effort with clear lungs bilaterally.   CV: Regular rate and rhythm.   PMI not palpated. Normal S1,S2  No gallop or rubs appreciated.  No murmur appreciated.  Intact carotid upstroke bilaterally without appreciated bruits.  Abdominal aorta not palpated; no abdominal bruit noted.  Intact pedal pulses.   No peripheral edema.  GI: No abd mass noted, soft; no organomegaly noted.  Bowel sounds present.   Muscular:  No significant kyphosis.  Strength WNL for age.  Ext: No cyanosis, clubbing, or stigmata of peripheral embolization.   Derm: No ulcers or stasis dermatitis of lower extremities.   Neuro: Alert and oriented x 3;  Grossly non-focal. Normal mood and affect.     Labs:   Recent Results (from the past 24 hour(s))   CBC with Auto Differential    Collection Time: 08/09/24  8:51 AM   Result Value Ref Range    WBC 6.1 4.1 - 11.1 K/uL    RBC 4.60 4.10 - 5.70 M/uL    Hemoglobin 15.1 12.1 - 17.0 g/dL    Hematocrit 43.2 36.6 - 50.3 %    MCV 93.9 80.0 - 99.0 FL    MCH 32.8 26.0 - 34.0 PG    MCHC 35.0 30.0 - 36.5 g/dL    RDW 12.6 11.5 - 14.5 %    Platelets 239 150 - 400 K/uL    MPV 10.9 8.9 - 12.9 FL    Nucleated RBCs 0.0 0  WBC    nRBC 0.00 0.00 - 0.01 K/uL    Neutrophils % 78 (H) 32 - 75 %    Lymphocytes % 13 12 - 49 %    Monocytes % 7 5 - 13 %    Eosinophils % 1 0 - 7 %    Basophils % 1 0 - 1 %    Immature Granulocytes % 0 0.0 - 0.5 %    Neutrophils Absolute 4.7 1.8 - 8.0 K/UL    Lymphocytes Absolute 0.8 0.8 - 3.5 K/UL    Monocytes Absolute 0.4 0.0 - 1.0 K/UL    Eosinophils Absolute 0.1 0.0 - 0.4 K/UL    Basophils Absolute 0.1 0.0 - 0.1 K/UL    Immature Granulocytes Absolute 0.0 0.00 - 0.04 K/UL

## 2024-08-09 NOTE — CONSENT
Informed Consent for Blood Component Transfusion Note    I have discussed with the patient the rationale for blood component transfusion; its benefits in treating or preventing fatigue, organ damage, or death; and its risk which includes mild transfusion reactions, rare risk of blood borne infection, or more serious but rare reactions. I have discussed the alternatives to transfusion, including the risk and consequences of not receiving transfusion. The patient had an opportunity to ask questions and had agreed to proceed with transfusion of blood components.    Electronically signed by Elsie Anderson MD on 8/9/24 at 12:07 PM EDT

## 2024-08-09 NOTE — H&P
Hospitalist Admission Note    NAME:   Justin Castillo   : 1975   MRN: [id number]     Date/Time: 2024 11:18 AM    Patient PCP: Shay Aviles MD    ______________________________________________________________________  Given the patient's current clinical presentation, I have a high level of concern for decompensation if discharged from the emergency department.  Complex decision making was performed, which includes reviewing the patient's available past medical records, laboratory results, and x-ray films.       My assessment of this patient's clinical condition and my plan of care is as follows.    Assessment / Plan:  Likely upper GI bleed  Hematemesis  Melena  Monitor patient in stepdown unit.  Serial H&H every 6 hours.  Transfuse if hemoglobin is less than 7.0 or if hemodynamically unstable.  I spoke with FARZAD Arango and he agrees to start entering patient on octreotide.  Meanwhile continue with IV Rocephin to cover for possible SBP.  40 mg of IV Protonix twice daily.    Mechanical aortic valve  Supratherapeutic INR  With patient is status post mechanical aortic valve and is on Coumadin.  He reports that his goal INR is between 2-3 as per his cardiac surgeon.  Patient has not been checking his INR as he should in general.  Today INR is 10.1.  Even though patient is actively having GI bleed, cardiology is recommending not to give any vitamin K because of the nature of his aortic valve that needs to be constantly anticoagulated.  Follow-up with serial INR.  Cardiology has been consulted.  Will get a stat EKG for baseline.    Alcohol use disorder  Abnormal LFT  Hepatic steatosis  Patient reports drinking at least 5 beers per day.  Patient has elevated AST and ALT secondary to alcohol.  Hepatic steatosis is also secondary to alcohol.  Start on CIWA protocol.  Follow-up with serial CMP.  Patient counseled about the importance of alcohol cessation.        Medical Decision Making:   I personally  4.0 g/dL    Albumin/Globulin Ratio 1.2 1.1 - 2.2     Protime-INR    Collection Time: 08/09/24  8:51 AM   Result Value Ref Range    INR 10.1 (HH) 0.9 - 1.1      Protime 91.5 (H) 9.0 - 11.1 sec   Lipase    Collection Time: 08/09/24  8:51 AM   Result Value Ref Range    Lipase 39 13 - 75 U/L   POC Lactic Acid    Collection Time: 08/09/24 11:04 AM   Result Value Ref Range    POC Lactic Acid 0.84 0.40 - 2.00 mmol/L         CT ABDOMEN PELVIS W WO CONTRAST Additional Contrast? 1    Result Date: 8/9/2024  EXAM: CT ABDOMEN PELVIS W WO CONTRAST INDICATION:  concern for GI bleed COMPARISON: CTA thorax 12/13/2021. TECHNIQUE: Multislice helical CT was performed through the abdomen and pelvis prior to and after uneventful rapid bolus intravenous contrast administration in unenhanced, arterial, portal venous, and delayed phases. Oral contrast was not administered. Contiguous 5 mm axial images were reconstructed and lung and soft tissue windows were generated. Coronal and sagittal reformations were generated.  CT dose reduction was achieved through use of a standardized protocol tailored for this examination and automatic exposure control for dose modulation. CONTRAST:  100 mL Isovue 370 IV. FINDINGS: LUNG BASES: Clear. INCIDENTALLY IMAGED HEART AND MEDIASTINUM: The visualized heart is normal in size without pericardial effusion. LIVER: Diffusely hypodense with sparing about the gallbladder fossa. No focal lesions otherwise.. GALLBLADDER: Unremarkable. SPLEEN: Unremarkable. PANCREAS: No mass or duct dilation. ADRENALS: Unremarkable. KIDNEYS AND URETERS: No mass, calculus, or hydronephrosis.  No hydroureter, ureteral stone,  or other abnormality. STOMACH: Unremarkable. No identified active extravasation. SMALL BOWEL: No dilatation or wall thickening. No identified active extravasation. COLON: No dilation or wall thickening. No identified active extravasation. APPENDIX: Unremarkable. PERITONEUM: No ascites or pneumoperitoneum.

## 2024-08-09 NOTE — PLAN OF CARE
Received patient from ED with admission Dx GI bleed. Patient stated taht he had bloody loose stool x3 today. And emesis x1. Denies nausea at this time. Offered Zofran but patient thinks he doesn't need it now. Hgb Q6hr done.  Diet advanced to clear liquid.  Tolerating well. No pain or discomfort at this time. Patient is independent in room. Refused bed/ chair alarm     Problem: Discharge Planning  Goal: Discharge to home or other facility with appropriate resources  Outcome: Progressing  Flowsheets (Taken 8/9/2024 1330)  Discharge to home or other facility with appropriate resources:   Identify barriers to discharge with patient and caregiver   Arrange for needed discharge resources and transportation as appropriate     Problem: Safety - Adult  Goal: Free from fall injury  Outcome: Progressing

## 2024-08-09 NOTE — PROGRESS NOTES
Chart reviewed and discussed with OT who talked with pt and wife. Pt drove himself to the hospital and independent. Pt and wife do not feel the need for PT or OT eval. Should mobilize when appropriate based on INR. Will sign off.

## 2024-08-09 NOTE — ED PROVIDER NOTES
[facility] 2 CARDIAC MEDICAL STEP DOWN  EMERGENCY DEPARTMENT ENCOUNTER    Patient Name: Justin Castillo  MRN: [id number]  YOB: 1975  Provider: Tucker Rogel MD  PCP: Shay Aviles MD    Time/Date of evaluation: 9:09 AM EDT on 24    History of Presenting Illness     Chief Complaint   Patient presents with    Hematemesis     Pt arrives ambulatory to triage reports vomiting this morning, reports he is a daily drinker of 3-4 beers. Pt reports vomiting the morning after sometimes is blood tinged. Pt also reports black stools this morning which has not happened before.     Melena     History Provided by: Patient   History is limited by: Nothing    HISTORY (Narrative):   Justin Castillo is a 48 y.o. male with history of aortic valve stenosis status post replacement with mechanical valve managed on Coumadin presenting to the ER for hematemesis and melanotic stool since yesterday.  Patient notes he has had 2 bouts of hematemesis yesterday and once today.  Notes he also drinks 4-5 beers a day.  Denies any history of esophageal varices.  Patient denies chest pain, shortness of breath, cough, runny nose, nausea, vomiting, diarrhea, headache, vision changes, lightheadedness, or other associated symptoms.      Nursing Notes were all reviewed and agreed with or any disagreements were addressed in the HPI.    Past History     PAST MEDICAL HISTORY:  Past Medical History:   Diagnosis Date    Aortic stenosis     Aortic valve stenosis        PAST SURGICAL HISTORY:  Past Surgical History:   Procedure Laterality Date    CARDIAC VALVE SURGERY  2022    [facility]       FAMILY HISTORY:  Family History   Problem Relation Age of Onset    Stroke Paternal Grandfather     Heart Disease Paternal Grandfather     Heart Disease Paternal Grandmother        SOCIAL HISTORY:  Social History     Tobacco Use    Smoking status: Former     Current packs/day: 0.00     Types: Cigarettes     Quit date: 2007     Years since quittin.6    diazePAM (VALIUM) injection 15 mg (has no administration in time range)     Or   diazePAM (VALIUM) tablet 20 mg (has no administration in time range)     Or   diazePAM (VALIUM) injection 20 mg (has no administration in time range)   octreotide (SANDOSTATIN) 500 mcg in sodium chloride 0.9 % 100 mL infusion (50 mcg/hr IntraVENous New Bag 8/11/24 1109)   cefTRIAXone (ROCEPHIN) 1,000 mg in sodium chloride 0.9 % 50 mL IVPB (mini-bag) (0 mg IntraVENous Stopped 8/11/24 1023)   melatonin tablet 6 mg (6 mg Oral Given 8/10/24 2133)   cefTRIAXone (ROCEPHIN) 2,000 mg in sodium chloride 0.9 % 50 mL IVPB (mini-bag) (0 mg IntraVENous Stopped 8/9/24 1159)   iopamidol (ISOVUE-370) 76 % injection 100 mL (100 mLs IntraVENous Given 8/9/24 0909)   octreotide (SANDOSTATIN) injection 50 mcg (50 mcg IntraVENous Given 8/9/24 1410)     CONSULTS:    IP CONSULT TO GI  IP CONSULT TO CARDIOLOGY  IP CONSULT TO GI    Social Determinants affecting Diagnosis/Treatment: None    DISCUSSION:  This patient with hematemesis and melena presents with symptoms concerning for acute, upper GI bleed, likely secondary to alcohol use concerning for esophageal varices versus alcoholic gastritis.  Differential diagnoses includes peptic ulcer disease (PUD = most common) versus less likely gastritis versus Vanessa-Prado tear versus AVM. Presentation not consistent with esophageal or gastric variceal bleeding or Boerhaave’s syndrome. Presentation not consistent with other etiologies upper GI bleeding at this time. No red flag features or high risk bleeding. No evidence of hemorrhagic shock. Based on this well validated study, the patient can safely be discharged for outpatient therapy // is “high risk” for needing a medical intervention to include transfusion, endoscopy or surgery. Plan to check labs to evaluate the extent of bleeding, including H/H. Will initiate treatment with PPI. No indication for octreotide or antibiotics given low likelihood of variceal

## 2024-08-10 LAB
ALBUMIN SERPL-MCNC: 4 G/DL (ref 3.5–5)
ALBUMIN/GLOB SERPL: 1.1 (ref 1.1–2.2)
ALP SERPL-CCNC: 60 U/L (ref 45–117)
ALT SERPL-CCNC: 396 U/L (ref 12–78)
ANION GAP SERPL CALC-SCNC: 7 MMOL/L (ref 5–15)
APTT PPP: 55 SEC (ref 22.1–31)
AST SERPL-CCNC: 466 U/L (ref 15–37)
BILIRUB SERPL-MCNC: 1 MG/DL (ref 0.2–1)
BUN SERPL-MCNC: 19 MG/DL (ref 6–20)
BUN/CREAT SERPL: 15 (ref 12–20)
CALCIUM SERPL-MCNC: 9.3 MG/DL (ref 8.5–10.1)
CHLORIDE SERPL-SCNC: 102 MMOL/L (ref 97–108)
CO2 SERPL-SCNC: 27 MMOL/L (ref 21–32)
CREAT SERPL-MCNC: 1.25 MG/DL (ref 0.7–1.3)
ERYTHROCYTE [DISTWIDTH] IN BLOOD BY AUTOMATED COUNT: 12.6 % (ref 11.5–14.5)
GLOBULIN SER CALC-MCNC: 3.6 G/DL (ref 2–4)
GLUCOSE SERPL-MCNC: 132 MG/DL (ref 65–100)
HCT VFR BLD AUTO: 34.7 % (ref 36.6–50.3)
HCT VFR BLD AUTO: 35.8 % (ref 36.6–50.3)
HCT VFR BLD AUTO: 36.9 % (ref 36.6–50.3)
HGB BLD-MCNC: 12 G/DL (ref 12.1–17)
HGB BLD-MCNC: 12.3 G/DL (ref 12.1–17)
HGB BLD-MCNC: 12.7 G/DL (ref 12.1–17)
INR PPP: 5.3 (ref 0.9–1.1)
IRON SATN MFR SERPL: 52 % (ref 20–50)
IRON SERPL-MCNC: 169 UG/DL (ref 35–150)
MAGNESIUM SERPL-MCNC: 2.1 MG/DL (ref 1.6–2.4)
MCH RBC QN AUTO: 32.7 PG (ref 26–34)
MCHC RBC AUTO-ENTMCNC: 34.4 G/DL (ref 30–36.5)
MCV RBC AUTO: 95.1 FL (ref 80–99)
NRBC # BLD: 0 K/UL (ref 0–0.01)
NRBC BLD-RTO: 0 PER 100 WBC
PHOSPHATE SERPL-MCNC: 3.4 MG/DL (ref 2.6–4.7)
PLATELET # BLD AUTO: 201 K/UL (ref 150–400)
PMV BLD AUTO: 10.6 FL (ref 8.9–12.9)
POTASSIUM SERPL-SCNC: 4.1 MMOL/L (ref 3.5–5.1)
PROT SERPL-MCNC: 7.6 G/DL (ref 6.4–8.2)
PROTHROMBIN TIME: 49.7 SEC (ref 9–11.1)
RBC # BLD AUTO: 3.88 M/UL (ref 4.1–5.7)
SODIUM SERPL-SCNC: 136 MMOL/L (ref 136–145)
THERAPEUTIC RANGE: ABNORMAL SECS (ref 58–77)
TIBC SERPL-MCNC: 327 UG/DL (ref 250–450)
WBC # BLD AUTO: 5.2 K/UL (ref 4.1–11.1)

## 2024-08-10 PROCEDURE — 2060000000 HC ICU INTERMEDIATE R&B

## 2024-08-10 PROCEDURE — 84100 ASSAY OF PHOSPHORUS: CPT

## 2024-08-10 PROCEDURE — 83540 ASSAY OF IRON: CPT

## 2024-08-10 PROCEDURE — 80053 COMPREHEN METABOLIC PANEL: CPT

## 2024-08-10 PROCEDURE — 83550 IRON BINDING TEST: CPT

## 2024-08-10 PROCEDURE — 6360000002 HC RX W HCPCS

## 2024-08-10 PROCEDURE — 85018 HEMOGLOBIN: CPT

## 2024-08-10 PROCEDURE — 36415 COLL VENOUS BLD VENIPUNCTURE: CPT

## 2024-08-10 PROCEDURE — 85730 THROMBOPLASTIN TIME PARTIAL: CPT

## 2024-08-10 PROCEDURE — 6370000000 HC RX 637 (ALT 250 FOR IP): Performed by: INTERNAL MEDICINE

## 2024-08-10 PROCEDURE — 85014 HEMATOCRIT: CPT

## 2024-08-10 PROCEDURE — 83735 ASSAY OF MAGNESIUM: CPT

## 2024-08-10 PROCEDURE — 85610 PROTHROMBIN TIME: CPT

## 2024-08-10 PROCEDURE — 85027 COMPLETE CBC AUTOMATED: CPT

## 2024-08-10 PROCEDURE — 2580000003 HC RX 258: Performed by: INTERNAL MEDICINE

## 2024-08-10 PROCEDURE — 2580000003 HC RX 258

## 2024-08-10 PROCEDURE — 6360000002 HC RX W HCPCS: Performed by: INTERNAL MEDICINE

## 2024-08-10 RX ADMIN — SODIUM CHLORIDE, PRESERVATIVE FREE 5 ML: 5 INJECTION INTRAVENOUS at 09:15

## 2024-08-10 RX ADMIN — PANTOPRAZOLE SODIUM 40 MG: 40 INJECTION, POWDER, FOR SOLUTION INTRAVENOUS at 17:59

## 2024-08-10 RX ADMIN — PANTOPRAZOLE SODIUM 40 MG: 40 INJECTION, POWDER, FOR SOLUTION INTRAVENOUS at 06:58

## 2024-08-10 RX ADMIN — OCTREOTIDE ACETATE 50 MCG/HR: 500 INJECTION, SOLUTION INTRAVENOUS; SUBCUTANEOUS at 11:47

## 2024-08-10 RX ADMIN — SODIUM CHLORIDE 1000 MG: 900 INJECTION INTRAVENOUS at 08:28

## 2024-08-10 RX ADMIN — SODIUM CHLORIDE: 9 INJECTION, SOLUTION INTRAVENOUS at 08:27

## 2024-08-10 RX ADMIN — MELATONIN 6 MG: at 21:33

## 2024-08-10 RX ADMIN — OCTREOTIDE ACETATE 50 MCG/HR: 500 INJECTION, SOLUTION INTRAVENOUS; SUBCUTANEOUS at 00:05

## 2024-08-10 NOTE — PROGRESS NOTES
Bedside and Verbal shift change report given to Aleena (oncoming nurse) by Alberto (offgoing nurse). Report included the following information Nurse Handoff Report, Index, Intake/Output, MAR, Recent Results, and Cardiac Rhythm NSR .         2144: H/H drawn and sent to lab.         2300: Bedside and Verbal shift change report given to Annetta(oncoming nurse) by Aleena (offgoing nurse). Report included the following information Nurse Handoff Report, Index, Intake/Output, MAR, Recent Results, and Cardiac Rhythm NSR .

## 2024-08-10 NOTE — PLAN OF CARE
Problem: Discharge Planning  Goal: Discharge to home or other facility with appropriate resources  8/9/2024 2231 by Aleena Barillas, RN  Outcome: Progressing  8/9/2024 1809 by Alberto Christian, RN  Outcome: Progressing  Flowsheets (Taken 8/9/2024 1330)  Discharge to home or other facility with appropriate resources:   Identify barriers to discharge with patient and caregiver   Arrange for needed discharge resources and transportation as appropriate     Problem: Safety - Adult  Goal: Free from fall injury  8/9/2024 2231 by Aleena Barillas, RN  Outcome: Progressing  8/9/2024 1809 by Alberto Christian, RN  Outcome: Progressing

## 2024-08-10 NOTE — PROGRESS NOTES
Hospitalist Progress Note    NAME:   Justin Castillo   : 1975   MRN: [id number]     Date/Time: 8/10/2024 2:13 PM  Patient PCP: Shay Aviles MD    Estimated discharge date:   Barriers: EGD, GI clearance      Assessment / Plan:    Likely upper GI bleed  Hematemesis  Melena  Supratherapeutic INR  Transfuse if hemoglobin is less than 7.0 or if hemodynamically unstable.  Continue octreotide drip  Continue PPI twice daily  Continue Rocephin  GI on board, timing of EGD as per GI    Mechanical aortic valve  Supratherapeutic INR  With patient is status post mechanical aortic valve and is on Coumadin.  He reports that his goal INR is between 2-3 as per his cardiac surgeon.  Patient has not been checking his INR as he should in general.  INR 10 on presentation  Currently holding warfarin, INR improving  FFP if bleeding  Appreciate cardiology help     Alcohol use disorder  Abnormal LFT  Hepatic steatosis  Patient reports drinking at least 5 beers per day.  Patient has elevated AST and ALT secondary to alcohol.  Hepatic steatosis is also secondary to alcohol.  Start on CIWA protocol.  Follow-up with serial CMP.  Patient counseled about the importance of alcohol cessation.           Medical Decision Making:   I personally reviewed labs: CBC, BMP, LFT, INR  I personally reviewed imaging:CT abdomen pelvis  I personally reviewed EKG:  Toxic drug monitoring:  Discussed case with: RN, patient    Code Status: Full code  DVT Prophylaxis: SCDs  Baseline: Independent from home    Subjective:     Chief Complaint / Reason for Physician Visit  \"Reported episode of melena yesterday evening.  No further vomiting\".  Discussed with RN events overnight.       Objective:     VITALS:   Last 24hrs VS reviewed since prior progress note. Most recent are:  Patient Vitals for the past 24 hrs:   BP Temp Temp src Pulse Resp SpO2   08/10/24 1100 124/87 98.5 °F (36.9 °C) Oral -- 18 --   08/10/24 0739 (!) 137/95 98.9 °F (37.2 °C) Oral

## 2024-08-10 NOTE — PROGRESS NOTES
0700: Bedside shift change report given to JANE Urban (oncoming nurse) by JANE Littlejohn (offgoing nurse). Report included the following information Nurse Handoff Report and ED Encounter Summary.

## 2024-08-10 NOTE — PROGRESS NOTES
End of Shift Note    Bedside shift change report given to Rajni LARA (oncoming nurse) by Annetta Guido RN (offgoing nurse).  Report included the following information SBAR, Kardex, MAR, Recent Results, and Cardiac Rhythm NSR    Shift worked:  [id number]     Shift summary and any significant changes:     N/a     Concerns for physician to address:  One episode of vomiting after 1700 dose of protonix, which pt stated had bright red blood in it, however this was prior to my shift.    Pt did have a small tar colored stool this morning.     Zone phone for oncoming shift:   [id number]       Activity:     Number times ambulated in hallways past shift: 0  Number of times OOB to chair past shift: 3    Cardiac:   Cardiac Monitoring: Yes           Access:  Current line(s): PIV     Genitourinary:   Urinary status: voiding    Respiratory:      Chronic home O2 use?: N/A  Incentive spirometer at bedside: N/A       GI:     Current diet:  ADULT DIET; Clear Liquid  Passing flatus: YES  Tolerating current diet: YES       Pain Management:   Patient states pain is manageable on current regimen: N/A    Skin:     Interventions: increase time out of bed and limit briefs    Patient Safety:  Fall Score:    Interventions: gripper socks       Length of Stay:  Expected LOS: 3  Actual LOS: 1      Annetta Guido RN

## 2024-08-11 LAB
ANION GAP SERPL CALC-SCNC: 5 MMOL/L (ref 5–15)
BUN SERPL-MCNC: 15 MG/DL (ref 6–20)
BUN/CREAT SERPL: 13 (ref 12–20)
CALCIUM SERPL-MCNC: 9.1 MG/DL (ref 8.5–10.1)
CHLORIDE SERPL-SCNC: 104 MMOL/L (ref 97–108)
CO2 SERPL-SCNC: 27 MMOL/L (ref 21–32)
CREAT SERPL-MCNC: 1.17 MG/DL (ref 0.7–1.3)
ERYTHROCYTE [DISTWIDTH] IN BLOOD BY AUTOMATED COUNT: 12.2 % (ref 11.5–14.5)
GLUCOSE SERPL-MCNC: 122 MG/DL (ref 65–100)
HCT VFR BLD AUTO: 33.8 % (ref 36.6–50.3)
HCT VFR BLD AUTO: 34.9 % (ref 36.6–50.3)
HGB BLD-MCNC: 11.9 G/DL (ref 12.1–17)
HGB BLD-MCNC: 12.1 G/DL (ref 12.1–17)
INR PPP: 1.7 (ref 0.9–1.1)
MAGNESIUM SERPL-MCNC: 2.2 MG/DL (ref 1.6–2.4)
MCH RBC QN AUTO: 33 PG (ref 26–34)
MCHC RBC AUTO-ENTMCNC: 34.7 G/DL (ref 30–36.5)
MCV RBC AUTO: 95.1 FL (ref 80–99)
NRBC # BLD: 0 K/UL (ref 0–0.01)
NRBC BLD-RTO: 0 PER 100 WBC
PLATELET # BLD AUTO: 196 K/UL (ref 150–400)
PMV BLD AUTO: 10.4 FL (ref 8.9–12.9)
POTASSIUM SERPL-SCNC: 3.8 MMOL/L (ref 3.5–5.1)
PROTHROMBIN TIME: 17.1 SEC (ref 9–11.1)
RBC # BLD AUTO: 3.67 M/UL (ref 4.1–5.7)
SODIUM SERPL-SCNC: 136 MMOL/L (ref 136–145)
WBC # BLD AUTO: 5.2 K/UL (ref 4.1–11.1)

## 2024-08-11 PROCEDURE — 80048 BASIC METABOLIC PNL TOTAL CA: CPT

## 2024-08-11 PROCEDURE — 85610 PROTHROMBIN TIME: CPT

## 2024-08-11 PROCEDURE — 85014 HEMATOCRIT: CPT

## 2024-08-11 PROCEDURE — 2060000000 HC ICU INTERMEDIATE R&B

## 2024-08-11 PROCEDURE — 85018 HEMOGLOBIN: CPT

## 2024-08-11 PROCEDURE — 83735 ASSAY OF MAGNESIUM: CPT

## 2024-08-11 PROCEDURE — 36415 COLL VENOUS BLD VENIPUNCTURE: CPT

## 2024-08-11 PROCEDURE — 2580000003 HC RX 258

## 2024-08-11 PROCEDURE — 6360000002 HC RX W HCPCS: Performed by: INTERNAL MEDICINE

## 2024-08-11 PROCEDURE — 85027 COMPLETE CBC AUTOMATED: CPT

## 2024-08-11 PROCEDURE — 6360000002 HC RX W HCPCS

## 2024-08-11 PROCEDURE — 2580000003 HC RX 258: Performed by: INTERNAL MEDICINE

## 2024-08-11 RX ADMIN — SODIUM CHLORIDE, PRESERVATIVE FREE 5 ML: 5 INJECTION INTRAVENOUS at 10:24

## 2024-08-11 RX ADMIN — PANTOPRAZOLE SODIUM 40 MG: 40 INJECTION, POWDER, FOR SOLUTION INTRAVENOUS at 05:24

## 2024-08-11 RX ADMIN — SODIUM CHLORIDE, PRESERVATIVE FREE 10 ML: 5 INJECTION INTRAVENOUS at 20:54

## 2024-08-11 RX ADMIN — OCTREOTIDE ACETATE 50 MCG/HR: 500 INJECTION, SOLUTION INTRAVENOUS; SUBCUTANEOUS at 00:10

## 2024-08-11 RX ADMIN — PANTOPRAZOLE SODIUM 40 MG: 40 INJECTION, POWDER, FOR SOLUTION INTRAVENOUS at 17:10

## 2024-08-11 RX ADMIN — OCTREOTIDE ACETATE 50 MCG/HR: 500 INJECTION, SOLUTION INTRAVENOUS; SUBCUTANEOUS at 11:09

## 2024-08-11 RX ADMIN — SODIUM CHLORIDE 1000 MG: 900 INJECTION INTRAVENOUS at 08:31

## 2024-08-11 ASSESSMENT — PAIN SCALES - GENERAL
PAINLEVEL_OUTOF10: 0
PAINLEVEL_OUTOF10: 0

## 2024-08-11 NOTE — PLAN OF CARE
Problem: Discharge Planning  Goal: Discharge to home or other facility with appropriate resources  8/10/2024 2022 by Aleena Barillas, RN  Outcome: Progressing  8/10/2024 1639 by Mari Workman, RN  Outcome: Progressing     Problem: Safety - Adult  Goal: Free from fall injury  Outcome: Progressing

## 2024-08-11 NOTE — PROGRESS NOTES
0700: Bedside shift change report given to JANE Urban (oncoming nurse) by JANE Flood (offgoing nurse). Report included the following information Nurse Handoff Report and Index.

## 2024-08-11 NOTE — PROGRESS NOTES
Bedside and Verbal shift change report given to Aleena (oncoming nurse) by Rajni (offgoing nurse). Report included the following information Nurse Handoff Report, Index, Intake/Output, MAR, Recent Results, and Cardiac Rhythm NSR .     2130: H/H drawn and sent to lab. PRN melatonin given        End of Shift Note    Bedside shift change report given to  (oncoming nurse) by ALEENA VELAZQUEZ RN (offgoing nurse).  Report included the following information SBAR, Kardex, Intake/Output, MAR, Recent Results, and Cardiac Rhythm NSR    Shift worked:  7p-7a     Shift summary and any significant changes:     AM labs completed     Concerns for physician to address:       Zone phone for oncoming shift:              ALEENA VELAZQUEZ RN

## 2024-08-12 ENCOUNTER — ANESTHESIA EVENT (OUTPATIENT)
Facility: HOSPITAL | Age: 49
DRG: 379 | End: 2024-08-12
Payer: COMMERCIAL

## 2024-08-12 ENCOUNTER — ANESTHESIA (OUTPATIENT)
Facility: HOSPITAL | Age: 49
DRG: 379 | End: 2024-08-12
Payer: COMMERCIAL

## 2024-08-12 VITALS
DIASTOLIC BLOOD PRESSURE: 92 MMHG | HEART RATE: 65 BPM | HEIGHT: 71 IN | WEIGHT: 209.88 LBS | SYSTOLIC BLOOD PRESSURE: 138 MMHG | TEMPERATURE: 97.7 F | RESPIRATION RATE: 16 BRPM | BODY MASS INDEX: 29.38 KG/M2 | OXYGEN SATURATION: 98 %

## 2024-08-12 LAB
HCT VFR BLD AUTO: 35.2 % (ref 36.6–50.3)
HGB BLD-MCNC: 12 G/DL (ref 12.1–17)
INR PPP: 1.4 (ref 0.9–1.1)
PROTHROMBIN TIME: 14 SEC (ref 9–11.1)

## 2024-08-12 PROCEDURE — 6370000000 HC RX 637 (ALT 250 FOR IP): Performed by: INTERNAL MEDICINE

## 2024-08-12 PROCEDURE — 3700000000 HC ANESTHESIA ATTENDED CARE: Performed by: STUDENT IN AN ORGANIZED HEALTH CARE EDUCATION/TRAINING PROGRAM

## 2024-08-12 PROCEDURE — 6360000002 HC RX W HCPCS: Performed by: INTERNAL MEDICINE

## 2024-08-12 PROCEDURE — 6360000002 HC RX W HCPCS

## 2024-08-12 PROCEDURE — 0DB68ZX EXCISION OF STOMACH, VIA NATURAL OR ARTIFICIAL OPENING ENDOSCOPIC, DIAGNOSTIC: ICD-10-PCS | Performed by: STUDENT IN AN ORGANIZED HEALTH CARE EDUCATION/TRAINING PROGRAM

## 2024-08-12 PROCEDURE — 7100000010 HC PHASE II RECOVERY - FIRST 15 MIN: Performed by: STUDENT IN AN ORGANIZED HEALTH CARE EDUCATION/TRAINING PROGRAM

## 2024-08-12 PROCEDURE — 85014 HEMATOCRIT: CPT

## 2024-08-12 PROCEDURE — 36415 COLL VENOUS BLD VENIPUNCTURE: CPT

## 2024-08-12 PROCEDURE — 85018 HEMOGLOBIN: CPT

## 2024-08-12 PROCEDURE — 3600007502: Performed by: STUDENT IN AN ORGANIZED HEALTH CARE EDUCATION/TRAINING PROGRAM

## 2024-08-12 PROCEDURE — 3600007512: Performed by: STUDENT IN AN ORGANIZED HEALTH CARE EDUCATION/TRAINING PROGRAM

## 2024-08-12 PROCEDURE — 3700000001 HC ADD 15 MINUTES (ANESTHESIA): Performed by: STUDENT IN AN ORGANIZED HEALTH CARE EDUCATION/TRAINING PROGRAM

## 2024-08-12 PROCEDURE — 85610 PROTHROMBIN TIME: CPT

## 2024-08-12 PROCEDURE — 2500000003 HC RX 250 WO HCPCS

## 2024-08-12 PROCEDURE — 7100000011 HC PHASE II RECOVERY - ADDTL 15 MIN: Performed by: STUDENT IN AN ORGANIZED HEALTH CARE EDUCATION/TRAINING PROGRAM

## 2024-08-12 PROCEDURE — 88305 TISSUE EXAM BY PATHOLOGIST: CPT

## 2024-08-12 PROCEDURE — 2709999900 HC NON-CHARGEABLE SUPPLY: Performed by: STUDENT IN AN ORGANIZED HEALTH CARE EDUCATION/TRAINING PROGRAM

## 2024-08-12 PROCEDURE — 2580000003 HC RX 258

## 2024-08-12 PROCEDURE — 2580000003 HC RX 258: Performed by: INTERNAL MEDICINE

## 2024-08-12 RX ORDER — SODIUM CHLORIDE 9 MG/ML
25 INJECTION, SOLUTION INTRAVENOUS PRN
Status: DISCONTINUED | OUTPATIENT
Start: 2024-08-12 | End: 2024-08-12 | Stop reason: HOSPADM

## 2024-08-12 RX ORDER — LIDOCAINE HYDROCHLORIDE 20 MG/ML
INJECTION, SOLUTION EPIDURAL; INFILTRATION; INTRACAUDAL; PERINEURAL PRN
Status: DISCONTINUED | OUTPATIENT
Start: 2024-08-12 | End: 2024-08-12 | Stop reason: SDUPTHER

## 2024-08-12 RX ORDER — ENOXAPARIN SODIUM 100 MG/ML
1 INJECTION SUBCUTANEOUS 2 TIMES DAILY
Status: DISCONTINUED | OUTPATIENT
Start: 2024-08-12 | End: 2024-08-12 | Stop reason: HOSPADM

## 2024-08-12 RX ORDER — SODIUM CHLORIDE 0.9 % (FLUSH) 0.9 %
5-40 SYRINGE (ML) INJECTION EVERY 12 HOURS SCHEDULED
Status: DISCONTINUED | OUTPATIENT
Start: 2024-08-12 | End: 2024-08-12 | Stop reason: HOSPADM

## 2024-08-12 RX ORDER — PANTOPRAZOLE SODIUM 40 MG/1
40 TABLET, DELAYED RELEASE ORAL
Status: DISCONTINUED | OUTPATIENT
Start: 2024-08-12 | End: 2024-08-12 | Stop reason: HOSPADM

## 2024-08-12 RX ORDER — SODIUM CHLORIDE 0.9 % (FLUSH) 0.9 %
5-40 SYRINGE (ML) INJECTION PRN
Status: DISCONTINUED | OUTPATIENT
Start: 2024-08-12 | End: 2024-08-12 | Stop reason: HOSPADM

## 2024-08-12 RX ORDER — ENOXAPARIN SODIUM 100 MG/ML
1 INJECTION SUBCUTANEOUS 2 TIMES DAILY
Qty: 10 ML | Refills: 0 | Status: SHIPPED | OUTPATIENT
Start: 2024-08-12 | End: 2024-08-17

## 2024-08-12 RX ORDER — PANTOPRAZOLE SODIUM 40 MG/1
40 TABLET, DELAYED RELEASE ORAL
Qty: 60 TABLET | Refills: 1 | Status: SHIPPED | OUTPATIENT
Start: 2024-08-12

## 2024-08-12 RX ADMIN — PROPOFOL 100 MG: 10 INJECTION, EMULSION INTRAVENOUS at 14:00

## 2024-08-12 RX ADMIN — SODIUM CHLORIDE, PRESERVATIVE FREE 10 ML: 5 INJECTION INTRAVENOUS at 09:08

## 2024-08-12 RX ADMIN — MELATONIN 6 MG: at 00:49

## 2024-08-12 RX ADMIN — SODIUM CHLORIDE 1000 MG: 900 INJECTION INTRAVENOUS at 09:08

## 2024-08-12 RX ADMIN — PROPOFOL 50 MG: 10 INJECTION, EMULSION INTRAVENOUS at 14:04

## 2024-08-12 RX ADMIN — LIDOCAINE HYDROCHLORIDE 100 MG: 20 INJECTION, SOLUTION EPIDURAL; INFILTRATION; INTRACAUDAL; PERINEURAL at 13:58

## 2024-08-12 RX ADMIN — PROPOFOL 100 MG: 10 INJECTION, EMULSION INTRAVENOUS at 14:02

## 2024-08-12 RX ADMIN — PROPOFOL 100 MG: 10 INJECTION, EMULSION INTRAVENOUS at 13:58

## 2024-08-12 RX ADMIN — OCTREOTIDE ACETATE 50 MCG/HR: 500 INJECTION, SOLUTION INTRAVENOUS; SUBCUTANEOUS at 01:01

## 2024-08-12 RX ADMIN — PANTOPRAZOLE SODIUM 40 MG: 40 INJECTION, POWDER, FOR SOLUTION INTRAVENOUS at 05:49

## 2024-08-12 ASSESSMENT — PAIN - FUNCTIONAL ASSESSMENT: PAIN_FUNCTIONAL_ASSESSMENT: 0-10

## 2024-08-12 NOTE — PROGRESS NOTES
1035: Consent for EGD obtained per the order. Placed in chart.    1138: Patient's tele order . Notified Dr. Victoria and received an order to discontinue the tele monitor.    1232: Patient ZEINAB for EGD.    1248: Report given to Jackie at [facility]. Patient will be going to room 3215 after EGD.

## 2024-08-12 NOTE — ANESTHESIA POSTPROCEDURE EVALUATION
Department of Anesthesiology  Postprocedure Note    Patient: Justin Castillo  MRN: [id number]  YOB: 1975  Date of evaluation: 8/12/2024    Procedure Summary       Date: 08/12/24 Room / Location: [facility] 02 / [facility] ENDOSCOPY    Anesthesia Start: 1351 Anesthesia Stop: 1415    Procedure: ESOPHAGOGASTRODUODENOSCOPY Diagnosis:       Hematemesis without nausea      (Hematemesis without nausea [K92.0])    Surgeons: Katalina Robles MD Responsible Provider: Matias Andrews MD    Anesthesia Type: MAC ASA Status: 3            Anesthesia Type: MAC    Johny Phase I: Johny Score: 10    Johny Phase II: Johny Score: 10    Anesthesia Post Evaluation    Patient location during evaluation: PACU  Patient participation: complete - patient participated  Level of consciousness: sleepy but conscious and responsive to verbal stimuli  Pain score: 0  Airway patency: patent  Nausea & Vomiting: no vomiting and no nausea  Cardiovascular status: blood pressure returned to baseline and hemodynamically stable  Respiratory status: acceptable  Hydration status: stable    No notable events documented.

## 2024-08-12 NOTE — DISCHARGE SUMMARY
Discharge Summary    Name: Justin Castillo  [id number]  YOB: 1975 (Age: 48 y.o.)   Date of Admission: 8/9/2024  Date of Discharge: 8/12/2024  Attending Physician: Jaxon Victoria MD    Discharge Diagnosis:   Likely upper GI bleed  Hematemesis  Melena    Mechanical aortic valve  Supratherapeutic INR  With patient is status post mechanical aortic valve and is on Coumadin.  He reports that his goal INR is between 2-3 as per his cardiac surgeon.  Patient has not been checking his INR as he should in general.  INR 1.4 today, getting EGD   We will start on Lovenox after procedure if okay from GI     Alcohol use disorder  Abnormal LFT  Hepatic steatosis  Patient reports drinking at least 5 beers per day.  Patient has elevated AST and ALT secondary to alcohol.  Hepatic steatosis is also secondary to alcohol.  Start on CIWA protocol.  Follow-up with serial CMP.  Patient counseled about the importance of alcohol cessation.           Consultations:  IP CONSULT TO GI  IP CONSULT TO CARDIOLOGY  IP CONSULT TO GI      Brief Admission History/Reason for Admission Per Elsie Anderson MD:       Brief Hospital Course by Main Problems:   Likely upper GI bleed  Hematemesis  Melena  Supratherapeutic INR  Transfuse if hemoglobin is less than 7.0 or if hemodynamically unstable.  Continue octreotide drip  Continue PPI twice daily  Continue Rocephin  Plan for EGD today, hemoglobin remained relatively stable        Mechanical aortic valve  Supratherapeutic INR  With patient is status post mechanical aortic valve and is on Coumadin.  He reports that his goal INR is between 2-3 as per his cardiac surgeon.  Patient has not been checking his INR as he should in general.  INR 1.4 today, getting EGD   We will start on Lovenox after procedure if okay from GI     Alcohol use disorder  Abnormal LFT  Hepatic steatosis  Patient reports drinking at least 5 beers per day.  Patient has elevated AST and

## 2024-08-12 NOTE — H&P
Gastroenterology History and Physical    Patient: Justin Castillo    Physician: Katalina Robles MD    Vital Signs: Blood pressure (!) 163/96, pulse 75, temperature 98 °F (36.7 °C), temperature source Temporal, resp. rate 15, height 1.803 m (5' 11\"), weight 95.2 kg (209 lb 14.1 oz), SpO2 96%.    Allergies: No Known Allergies    Procedure: endoscopy    Indication: Hematemesis    History:  Past Medical History:   Diagnosis Date    Aortic stenosis     Aortic valve stenosis       Past Surgical History:   Procedure Laterality Date    CARDIAC VALVE SURGERY  2022    [facility]      Social History     Socioeconomic History    Marital status:      Spouse name: None    Number of children: None    Years of education: None    Highest education level: None   Tobacco Use    Smoking status: Former     Current packs/day: 0.00     Types: Cigarettes     Quit date: 2007     Years since quittin.6    Smokeless tobacco: Never   Substance and Sexual Activity    Alcohol use: Yes     Alcohol/week: 3.0 - 4.0 standard drinks of alcohol    Drug use: Never     Types: OTC, Prescription     Social Determinants of Health     Food Insecurity: No Food Insecurity (2024)    Hunger Vital Sign     Worried About Running Out of Food in the Last Year: Never true     Ran Out of Food in the Last Year: Never true   Transportation Needs: No Transportation Needs (2024)    PRAPARE - Transportation     Lack of Transportation (Medical): No     Lack of Transportation (Non-Medical): No   Housing Stability: Low Risk  (2024)    Housing Stability Vital Sign     Unable to Pay for Housing in the Last Year: No     Number of Places Lived in the Last Year: 1     Unstable Housing in the Last Year: No      Family History   Problem Relation Age of Onset    Esophageal Cancer Father     Heart Disease Paternal Grandmother     Stroke Paternal Grandfather     Heart Disease Paternal Grandfather        Medications:   Prior to Admission medications    Patient seen and evaluated this am, off sedation       T(F): 101.8 (23 @ 07:01), Max: 103.1 (23 @ 03:01)  HR: 57 (23 @ 10:00)  BP: --  RR: 26/20  SpO2: 98% (23 @ 10:00) (94% - 100%)    PHYSICAL EXAM:  GENERAL: NAD  HEAD:  Atraumatic, Normocephalic  EYES: EOMI, PERRLA, conjunctiva and sclera clear  NERVOUS SYSTEM: positive gag  CHEST/LUNG:  bilateral  rales  HEART: Regular rate and rhythm; No murmurs, rubs, or gallops  ABDOMEN: Soft, Nontender, Nondistended; Bowel sounds present  EXTREMITIES:  2+ Peripheral Pulses, No clubbing, cyanosis, or edema    LABS      146  |  111<H>  |  47<H>  ----------------------------<  128<H>  4.9   |  22  |  1.3    Ca    8.3<L>      2023 05:39  Mg     2.6         TPro  4.8<L>  /  Alb  2.6<L>  /  TBili  0.6  /  DBili  x   /  AST  75<H>  /  ALT  23  /  AlkPhos  88                            9.2    21.02 )-----------( 263      ( 2023 05:39 )             29.4       Mode: AC/ CMV (Assist Control/ Continuous Mandatory Ventilation)  RR (machine): 20  TV (machine): 400  FiO2: 40  PEEP: 5      CARDIAC ENZYMES    Troponin T, Serum: 2.76 ng/mL (23 @ 06:15)    < from: TTE Echo Complete w/o Contrast w/ Doppler (23 @ 14:39) >    Summary:   1. Apical septal segment, apical inferior segment, and apex are abnormal   as described above.   2. LV Ejection Fraction by Sepulveda's Method with a biplane EF of 45 %.   3. Normal left atrial size.   4. There is no evidence of pericardial effusion.   5. Mild mitral annular calcification.   6. Mild mitral valve regurgitation.   7. There is mild aortic root calcification.    < end of copied text >    Culture Results:   No growth (23)  Culture Results:   No growth to date. (23)  Culture Results:   Rare Streptococcus mitis/oralis group  Rare Streptococcus anginosus  "Susceptibilities not performed" (23)  Culture Results:   No growth (23)  Culture Results:   No growth to date. (23)  Culture Results:   No growth to date. (23)    VEEG in the last 24 hours:    Background - very low amplitude, at the sensitivity of 3-5 mcV there is evidence for cortical activity, reaching frequencies in the range of 3-4 Hz that is expressed better form the left hemsiphere    Focal and generalized slowin. severe generalized slowing  2. suggestive of right hemispheric focal slowing    Interictal activity - moderate number of diffusely expressed sharp transients    Events - none    Seizures - none    RADIOLOGY  < from: Xray Chest 1 View- PORTABLE-Urgent (Xray Chest 1 View- PORTABLE-Urgent .) (23 @ 19:06) >  Low lung volumes with unchanged bibasilar opacities.    < end of copied text >    MEDICATIONS  (STANDING):  aspirin  chewable 81 milliGRAM(s) Oral daily  atorvastatin 40 milliGRAM(s) Oral at bedtime  busPIRone 30 milliGRAM(s) Oral every 8 hours  chlorhexidine 0.12% Liquid 15 milliLiter(s) Oral Mucosa every 12 hours  chlorhexidine 2% Cloths 1 Application(s) Topical <User Schedule>  chlorhexidine 4% Liquid 1 Application(s) Topical <User Schedule>  clopidogrel Tablet 300 milliGRAM(s) Oral once  clopidogrel Tablet 75 milliGRAM(s) Oral daily  fentaNYL   Infusion. 0.5 MICROgram(s)/kG/Hr (5.5 mL/Hr) IV Continuous <Continuous>  heparin   Injectable 5000 Unit(s) SubCutaneous every 12 hours  midazolam Infusion 0.02 mG/kG/Hr (2.2 mL/Hr) IV Continuous <Continuous>  norepinephrine Infusion 0.05 MICROgram(s)/kG/Min (5.16 mL/Hr) IV Continuous <Continuous>  piperacillin/tazobactam IVPB.. 3.375 Gram(s) IV Intermittent every 6 hours  polyethylene glycol 3350 17 Gram(s) Oral daily  propofol Infusion 5 MICROgram(s)/kG/Min (2.38 mL/Hr) IV Continuous <Continuous>  senna 2 Tablet(s) Oral at bedtime  vancomycin  IVPB 1000 milliGRAM(s) IV Intermittent once    MEDICATIONS  (PRN):  acetaminophen     Tablet .. 650 milliGRAM(s) Oral every 6 hours PRN Temp greater or equal to 38C (100.4F), Moderate Pain (4 - 6)  LORazepam   Injectable 1 milliGRAM(s) IV Push every 12 hours PRN Agitation  sodium chloride 0.9% lock flush 10 milliLiter(s) IV Push every 1 hour PRN Pre/post blood products, medications, blood draw, and to maintain line patency

## 2024-08-12 NOTE — PLAN OF CARE
Problem: Discharge Planning  Goal: Discharge to home or other facility with appropriate resources  8/12/2024 1612 by Jackie Palacios RN  Outcome: Adequate for Discharge  8/12/2024 0954 by Jo, Yeong Ae Jenny, RN  Outcome: Progressing  8/12/2024 0625 by Dereck Kiran RN  Outcome: Progressing     Problem: Safety - Adult  Goal: Free from fall injury  8/12/2024 1612 by Jackie Palacios RN  Outcome: Adequate for Discharge  8/12/2024 0954 by Jo, Yeong Ae Jenny, RN  Outcome: Progressing  8/12/2024 0625 by Dereck Kiran RN  Outcome: Progressing     Problem: Pain  Goal: Verbalizes/displays adequate comfort level or baseline comfort level  8/12/2024 1612 by Jackie Palacios RN  Outcome: Adequate for Discharge  8/12/2024 0954 by Jo, Yeong Ae Jenny, RN  Outcome: Progressing  8/12/2024 0625 by Dereck Kiran RN  Outcome: Progressing

## 2024-08-12 NOTE — ANESTHESIA PRE PROCEDURE
Department of Anesthesiology  Preprocedure Note       Name:  Justin Castillo   Age:  48 y.o.  :  1975                                          MRN:  [id number]         Date:  2024      Surgeon: Surgeon(s):  Katalina Robles MD    Procedure: Procedure(s):  ESOPHAGOGASTRODUODENOSCOPY    Medications prior to admission:   Prior to Admission medications    Medication Sig Start Date End Date Taking? Authorizing Provider   warfarin (COUMADIN) 7.5 MG tablet Take 1 tablet by mouth every other day    Provider, MD Eriberto   warfarin (COUMADIN) 5 MG tablet Take 2 tablets by mouth every other day 22   Automatic Reconciliation, Ar       Current medications:    Current Facility-Administered Medications   Medication Dose Route Frequency Provider Last Rate Last Admin    acetaminophen (TYLENOL) tablet 650 mg  650 mg Oral Q4H PRN Tucker Rogel MD        sodium chloride flush 0.9 % injection 5-40 mL  5-40 mL IntraVENous 2 times per day Elsie Anderson MD   10 mL at 24 0908    sodium chloride flush 0.9 % injection 5-40 mL  5-40 mL IntraVENous PRN Elsie Anderson MD        0.9 % sodium chloride infusion   IntraVENous PRN Elsie Anderson MD 5 mL/hr at 08/10/24 0827 New Bag at 08/10/24 0827    ondansetron (ZOFRAN-ODT) disintegrating tablet 4 mg  4 mg Oral Q8H PRN Elsie Anderson MD        Or    ondansetron (ZOFRAN) injection 4 mg  4 mg IntraVENous Q6H PRN Elsie Anderson MD        acetaminophen (TYLENOL) tablet 650 mg  650 mg Oral Q6H PRN Elsie Anderson MD        Or    acetaminophen (TYLENOL) suppository 650 mg  650 mg Rectal Q6H PRN Elsie Anderson MD        pantoprazole (PROTONIX) 40 mg in sodium chloride (PF) 0.9 % 10 mL injection  40 mg IntraVENous Q12H Elsie Anderson MD   40 mg at 24 0549    diazePAM (VALIUM) tablet 5 mg  5 mg Oral Q1H PRN Elsie Anderson MD        Or    diazePAM (VALIUM) injection 5 mg  5 mg IntraVENous Q1H PRN Elsie Anderson MD

## 2024-08-12 NOTE — OP NOTE
[facility]                   Endoscopic Gastroduodenoscopy Procedure Note      8/12/2024  Justin Castillo  1975  [id number]    Procedure: Endoscopic Gastroduodenoscopy with biopsy    Indication: Hematemesis     Pre-operative Diagnosis: see indication above    Post-operative Diagnosis: see findings below    : Katalina Robles MD    Referring Provider:  Shay Aviles MD      Anesthesia/Sedation:  MAC anesthesia Propofol    Airway assessment: No airway problems anticipated    Pre-Procedural Exam:      Airway: clear, no airway problems anticipated  Heart: RRR, without gallops or rubs  Lungs: clear bilaterally without wheezes, crackles, or rhonchi  Abdomen: soft, nontender, nondistended, bowel sounds present  Mental Status: awake, alert and oriented to person, place and time       Procedure Details     After infomed consent was obtained for the procedure, with all risks and benefits of procedure explained the patient was taken to the endoscopy suite and placed in the left lateral decubitus position.  Following sequential administration of sedation as per above, the endoscope was inserted into the mouth and advanced under direct vision to second portion of the duodenum.  A careful inspection was made as the gastroscope was withdrawn, including a retroflexed view of the proximal stomach; findings and interventions are described below.      Findings:   Esophagus:normal esophageal mucosa.  The Z line measured 42 cm from the incisors.  There was no esophagitis and no esophageal varices  Stomach: mild erythema in the fundus consistent with retch gastritis, otherwise normal gastric mucosa.  Biopsies were obtained for evaluation of H pylori  Duodenum: moderate erythema and friability in the duodenal bulb, otherwise normal second portion of the duodenum.  No evidence of upper GI bleeding    Therapies:  biopsy of stomach     Specimens:  Jar 1) gastric biopsy

## 2024-08-12 NOTE — PROGRESS NOTES
Patient arrived to endoscopy via stretcher. Patient A&Ox4 and on room air. VSS. The risks and benefits of the bite block have been explained to patient.  Patient verbalizes understanding. Patient states last dose of Warfarin was on Friday morning pta. Patient has wallet and telebox at bedside.

## 2024-08-12 NOTE — PROGRESS NOTES
Endoscopy recovery  Patient returned to baseline, vital signs stable (see vital sign flowsheet). Patient offered liquids and tolerated well. Respiratory status within defined limits. Abdomen soft not tender. Skin with in defined limits.

## 2024-08-12 NOTE — PROGRESS NOTES
Endoscope was pre-cleaned at bedside immediately following procedure by JOSE R Cunningham    Telebox transported with patient to endo recovery.

## 2024-08-12 NOTE — PROGRESS NOTES
1424- Attempted report- unable to give report to primary RN. Please call GI back at [phone] for additional report or see GI note for details.

## 2024-08-12 NOTE — PROGRESS NOTES
Bedside and Verbal shift change report given to JANE Harden (oncoming nurse) by JANE Guerra (offgoing nurse). Report included the following information Nurse Handoff Report, Adult Overview, Intake/Output, MAR, Recent Results, Cardiac Rhythm NSR, Neuro Assessment, and Event Log.          End of Shift Note    Bedside shift change report given to JANE Moise (oncoming nurse) by Dereck Kiran RN (offgoing nurse).  Report included the following information SBAR, Intake/Output, MAR, Recent Results, Med Rec Status, and Cardiac Rhythm NSR    Shift worked:  7p-7a     Shift summary and any significant changes:     No acute events overnight     Concerns for physician to address:       Zone phone for oncoming shift:              Dereck Kiran RN

## 2024-08-12 NOTE — CARE COORDINATION
Patient is clear from a CM standpoint.    Transition of Care Plan:  Home with follow ups    RUR: 5% (low RUR)  Prior Level of Functioning: Independent  Disposition: Home with follow ups; substance resources left on AVS.  If SNF or IPR: Date FOC offered: N/A  Date FOC received: N/A  Accepting facility: N/A  Date authorization started with reference number: N/A  Date authorization received and expires: N/A  Follow up appointments: PCP/specialists if needed.  [facility] contact information listed on AVS.  DME needed: None.  Transportation at discharge: Self  IM/IMM Medicare/ letter given: N/A BCBS  Is patient a [place] and connected with [facility]? No.   If yes, was  transfer form completed and [facility] notified? N/A  Caregiver Contact: Nisha Castillo  - spouse - [phone]   Discharge Caregiver contacted prior to discharge? Patient to contact.  Care Conference needed? No.  Barriers to discharge: None.    1040 - Attending to send lovenox prescription closer to discharge; CM to price check once this is completed.  IDR team notified that patient is planning to drive self home on discharge.    1427 - CM unable to meet with patient at bedside as they are currently ZE[facility] in Endo.  Patient appears to be transferring to a different unit.    1616 - The following substance resources left on AVS:    -[facility]: [phone]  [email or web address]   -[facility]: [phone]  [email or web address]   -[facility]: [phone] or [phone]    [facility] pharmacy confirmed that lovenox was delivered at bedside.     08/12/24 1623   Services At/After Discharge   Transition of Care Consult (CM Consult) N/A   Services At/After Discharge None   Mode of Transport at Discharge Self   Confirm Follow Up Transport Family   Condition of Participation: Discharge Planning   The Plan for Transition of Care is related to the

## 2024-08-12 NOTE — PROGRESS NOTES
Hospitalist Progress Note    NAME:   Justin Castillo   : 1975   MRN: [id number]     Date/Time: 2024 11:01 AM  Patient PCP: Shay Aviles MD    Estimated discharge date:   Barriers: Endoscopy      Assessment / Plan:    Likely upper GI bleed  Hematemesis  Melena  Supratherapeutic INR  Transfuse if hemoglobin is less than 7.0 or if hemodynamically unstable.  Continue octreotide drip  Continue PPI twice daily  Continue Rocephin  Plan for EGD today, hemoglobin remained relatively stable      Mechanical aortic valve  Supratherapeutic INR  With patient is status post mechanical aortic valve and is on Coumadin.  He reports that his goal INR is between 2-3 as per his cardiac surgeon.  Patient has not been checking his INR as he should in general.  INR 1.4 today, getting EGD   We will start on Lovenox after procedure if okay from GI     Alcohol use disorder  Abnormal LFT  Hepatic steatosis  Patient reports drinking at least 5 beers per day.  Patient has elevated AST and ALT secondary to alcohol.  Hepatic steatosis is also secondary to alcohol.  Start on CIWA protocol.  Follow-up with serial CMP.  Patient counseled about the importance of alcohol cessation.           Medical Decision Making:   I personally reviewed labs: CBC, BMP, LFT, INR  I personally reviewed imaging:CT abdomen pelvis  I personally reviewed EKG:  Toxic drug monitoring:  Discussed case with: RN, patient    Code Status: Full code  DVT Prophylaxis: SCDs  Baseline: Independent from home    Subjective:     Chief Complaint / Reason for Physician Visit  Patient walking around in the hallway.  No melena or hematemesis overnight  Objective:     VITALS:   Last 24hrs VS reviewed since prior progress note. Most recent are:  Patient Vitals for the past 24 hrs:   BP Temp Temp src Pulse Resp SpO2   24 0730 129/78 98 °F (36.7 °C) Oral 56 18 95 %   24 0315 124/83 97.5 °F (36.4 °C) Oral -- -- 95 %   24 2340 115/75 97.8 °F (36.6  imaging studies.  Pertinent labs include:  Recent Labs     08/10/24  0335 08/10/24  1006 08/11/24  0533 08/11/24  1615 08/12/24  0419   WBC 5.2  --  5.2  --   --    HGB 12.7   < > 12.1 11.9* 12.0*   HCT 36.9   < > 34.9* 33.8* 35.2*     --  196  --   --     < > = values in this interval not displayed.     Recent Labs     08/10/24  0335 08/10/24  1006 08/11/24  0533 08/11/24  1615 08/12/24  0419     --  136  --   --    K 4.1  --  3.8  --   --      --  104  --   --    CO2 27  --  27  --   --    GLUCOSE 132*  --  122*  --   --    BUN 19  --  15  --   --    CREATININE 1.25  --  1.17  --   --    CALCIUM 9.3  --  9.1  --   --    MG 2.1  --  2.2  --   --    PHOS 3.4  --   --   --   --    BILITOT 1.0  --   --   --   --    *  --   --   --   --    *  --   --   --   --    INR  --  5.3*  --  1.7* 1.4*       Signed: Jaxon Victoria MD

## 2024-08-12 NOTE — DISCHARGE INSTRUCTIONS
HOSPITALIST DISCHARGE INSTRUCTIONS    NAME: Justin Castillo   :  1975   MRN:  [id number]     Date/Time:  2024 4:09 PM    ADMIT DATE: 2024   DISCHARGE DATE: 2024     Duodenitis    It is important that you take the medication exactly as they are prescribed.   Keep your medication in the bottles provided by the pharmacist and keep a list of the medication names, dosages, and times to be taken in your wallet.   Do not take other medications without consulting your doctor.       What to do at Home    Recommended diet:  regular diet    Recommended activity: activity as tolerated      If you have questions regarding the hospital related prescriptions or hospital related issues please call [facility] office at . You can always direct your questions to your primary care doctor if you are unable to reach your hospital physician; your PCP works as an extension of your hospital doctor just like your hospital doctor is an extension of your PCP for your time at the hospital ([facility])    If you experience any of the following symptoms then please call your primary care physician or return to the emergency room if you cannot get hold of your doctor:    Fever, chills, nausea, vomiting, or persistent diarrhea  Worsening weakness or new problems with your speech or balance  Dark stools or visible blood in your stools  New Leg swelling or shortness of breath as these could be signs of a clot    Additional Instructions:      Bring these papers with you to your follow up appointments. The papers will help your doctors be sure to continue the care plan from the hospital.              Information obtained by :  I understand that if any problems occur once I am at home I am to contact my physician.    I understand and acknowledge receipt of the instructions indicated above.

## 2024-08-21 NOTE — PROGRESS NOTES
Physician Progress Note      PATIENT:               RUDDY VIERA  CSN #:                  [id number]  :                       1975  ADMIT DATE:       2024 8:36 AM  DISCH DATE:        2024 4:42 PM  RESPONDING  PROVIDER #:        Jaxon Victoria MD          QUERY TEXT:    Patient admitted with gib. Documentation reflects possible SBP (H&P)  If   possible, please document in the progress notes and discharge summary if SBP   was:    The medical record reflects the following:  Risk Factors:etoh, Abnormal LFT, Hepatic steatosis  Clinical Indicators: H&P-continue with IV Rocephin to cover for possible SBP.  Treatment:Rocephin  Jo Chen RN/CDI [phone]  Options provided:  -- SBP confirmed after study  -- SBP treated and resolved  -- SBP  ruled out after study  -- Other - I will add my own diagnosis  -- Disagree - Not applicable / Not valid  -- Disagree - Clinically unable to determine / Unknown  -- Refer to Clinical Documentation Reviewer    PROVIDER RESPONSE TEXT:    SBP ruled out after study.    Query created by: Jo Ye on 8/15/2024 2:32 PM      Electronically signed by:  Jaxon Victoria MD 2024 5:10 PM

## 2024-12-15 NOTE — PROGRESS NOTES
Orders received, chart reviewed and spoke with pt and his family.  Pt feels that he is at his mobility and ADL baseline.  He drove himself to the hospital and prior to admit was completely independent with all ADLS, IADLs and was working.  Pt wasn't mobilized or assessed the session and was supine upon arrival.  Screening performed only.  Discussed that his INR is supratherapeutic and to not get up on his own and clear mobility with nursing.  Will sign off and pt is in agreement.     Productive cough, congestion  Pt prefers to be referred to as Rosa-not [facility]

## (undated) DEVICE — SUTURE VCRL SZ 0 L18IN ABSRB VLT L36MM CT-1 1/2 CIR J740D

## (undated) DEVICE — SUT SLK 2 60IN TIE MP BLK --

## (undated) DEVICE — COSEAL SURGICAL SEALANT (COSEAL) IS COMPOSED OF TWO SYNTHETIC POLYETHYLENE GLYCOLS (PEGS), A DILUTE HYDROGEN CHLORIDE SOLUTION AND A SODIUM PHOSPHATE/SODIUM CARBONATE SOLUTION. THESE COMPONENTS COME IN A KIT THAT INCLUDES AN APPLICATOR(S). AT THE TIME OF ADMINISTRATION, THE MIXED PEGS AND SOLUTIONS FORM A HYDROGEL THAT ADHERES TO TISSUE, SYNTHETIC GRAFT MATERIALS AND COVALENTLY BONDS TO ITSELF: Brand: COSEAL SURGICAL SEALANT

## (undated) DEVICE — VENT CATHETER: Brand: EDWARDS LIFESCIENCES VENT CATHETER

## (undated) DEVICE — TTL1LYR 16FR10ML 100%SIL TMPST TR: Brand: MEDLINE

## (undated) DEVICE — SUTURE ETHBND EXCEL SZ 3-0 L36IN NONABSORBABLE GRN BB L17MM X588H

## (undated) DEVICE — SYR 10ML LUER LOK 1/5ML GRAD --

## (undated) DEVICE — SUMP PERICARD AD 20FR WT TIP VERSATILE DSGN MULT PRT VENT

## (undated) DEVICE — YANKAUER,BULB TIP,W/O VENT,RIGID,STERILE: Brand: MEDLINE

## (undated) DEVICE — SUTURE MCRYL SZ 3-0 L27IN ABSRB UD L19MM PS-2 3/8 CIR PRIM Y427H

## (undated) DEVICE — CANNULA PERF 15FR L12.5IN RG STPCOCK WIREWOUND BODY

## (undated) DEVICE — 3M™ MEDIPORE™ H SOFT CLOTH SURGICAL TAPE 2864, 4 INCH X 10 YARD (10CM X 9,14M), 12 ROLLS/CASE: Brand: 3M™ MEDIPORE™

## (undated) DEVICE — Device: Brand: JELCO

## (undated) DEVICE — BAG RED 3PLY 2MIL 30X40 IN

## (undated) DEVICE — GLOVE SURG SZ 65 CRM LTX FREE POLYISOPRENE POLYMER BEAD ANTI

## (undated) DEVICE — INSERT SUT HLD F/OCTBS RETRCTR --

## (undated) DEVICE — GLOVE SURG SZ 8 CRM LTX FREE POLYISOPRENE POLYMER BEAD ANTI

## (undated) DEVICE — COVER,TABLE,HEAVY DUTY,77"X90",STRL: Brand: MEDLINE

## (undated) DEVICE — CATHETER,URETHRAL,REDRUBBER,STRL,18FR: Brand: MEDLINE

## (undated) DEVICE — DRAIN SURG L3/8-1/2IN DIA3/16IN SIL CARD CONN 1:1 BLAK

## (undated) DEVICE — GOWN,SIRUS,NONRNF,SETINSLV,XL,20/CS: Brand: MEDLINE

## (undated) DEVICE — SUTURE SZ 7 L18IN NONABSORBABLE SIL CCS L48MM 1/2 CIR STRNM M655G

## (undated) DEVICE — GLUE TISS 10ML PLAS STD SPRD TIP SYR PLUNG PREFIL SKIN CLSR 5PK

## (undated) DEVICE — CANNULA AORT ROOT INTRO STD TIP 5FR OVERALL LEN 55IN DLP

## (undated) DEVICE — SOL INJ SOD CL 0.9% 500ML BG --

## (undated) DEVICE — MEDI-TRACE CADENCE ADULT, DEFIBRILLATION ELECTRODE -RTS  (10 PR/PK) - PHILIPS: Brand: MEDI-TRACE CADENCE

## (undated) DEVICE — SYR 50ML LR LCK 1ML GRAD NSAF --

## (undated) DEVICE — GLOVE SURG SZ 7 CRM LTX FREE POLYISOPRENE POLYMER BEAD ANTI

## (undated) DEVICE — DRAIN,WOUND,ROUND,24FR,5/16",FULL-FLUTED: Brand: MEDLINE

## (undated) DEVICE — 6 FOOT DISPOSABLE EXTENSION CABLE WITH SAFE CONNECT / SCREW-DOWN

## (undated) DEVICE — PLEDGET SURG W3/16XL0.25IN THK1.65MM PTFE OVL FELT FOR THE

## (undated) DEVICE — KIT,1200CC CANISTER,3/16"X6' TUBING: Brand: MEDLINE INDUSTRIES, INC.

## (undated) DEVICE — AVID DUAL STAGE VENOUS DRAINAGE CANNULA: Brand: AVID DUAL STAGE VENOUS DRAINAGE CANNULA

## (undated) DEVICE — SUT PROL 4-0 36IN RB1 DA BLU --

## (undated) DEVICE — OPEN HEART B-RICHMOND: Brand: MEDLINE INDUSTRIES, INC.

## (undated) DEVICE — CANNULA PERF ART HI FLO VENT DIL TIP J TIP 3/8IN CONN 22FR

## (undated) DEVICE — SUTURE PROL SZ 4-0 L36IN NONABSORBABLE BLU L26MM SH 1/2 CIR 8521H

## (undated) DEVICE — TRANSFER PK BLD PROD 300ML --

## (undated) DEVICE — SOLUTION IRRIG 1000ML STRL H2O USP PLAS POUR BTL

## (undated) DEVICE — SYSTEM TISS GLUE 5ML CONTAIN SYR PLUNG STD SYR TIP 12MM 5PKS/EA

## (undated) DEVICE — OPEN HEART A- RICHMOND: Brand: MEDLINE INDUSTRIES, INC.

## (undated) DEVICE — DRAPE SLUSH DISC W44XL66IN ST FOR RND BSIN HUSH SLUSH SYS

## (undated) DEVICE — 6 INCH MONITORING EXTENSION SET: Brand: ICU MEDICAL

## (undated) DEVICE — PRESSURE MONITORING SET: Brand: TRUWAVE

## (undated) DEVICE — Device

## (undated) DEVICE — CONNECTOR DRNGE W3/8-0.5XH3/16XL3/16IN 2:1 SIL CARD STR

## (undated) DEVICE — GLOVE SURG SZ 6 CRM LTX FREE POLYISOPRENE POLYMER BEAD ANTI

## (undated) DEVICE — SUTURE PROL SZ 5-0 L30IN NONABSORBABLE BLU L13MM RB-2 1/2 8710H

## (undated) DEVICE — ADAPTER CATH WHT DISP FOR ANES

## (undated) DEVICE — DRSG BORDR MPLX HEEL 8.7X9.1IN --

## (undated) DEVICE — SOLUTION IV 1000ML 140MEQ/L SOD 5MEQ/L K 3MEQ/L MG 27MEQ/L

## (undated) DEVICE — 72" ARTERIAL PRESSURE TUBING: Brand: ICU MEDICAL

## (undated) DEVICE — BLANKET WRM W25XL64IN NONWOVEN SFT LTWT PLIABLE HYPR

## (undated) DEVICE — AGENT HEMSTAT W4XL4IN OXIDIZED REGENERATED CELOS ABSRB SFT

## (undated) DEVICE — SYR 20ML LL STRL LF --

## (undated) DEVICE — DRESSING SIL W4XL5IN ANTIBACT GELLING FBR CYTOFORM

## (undated) DEVICE — GLOVE SURG SZ 75 CRM LTX FREE POLYISOPRENE POLYMER BEAD ANTI

## (undated) DEVICE — SOLUTION IRRIG 1000ML 0.9% SOD CHL USP POUR PLAS BTL

## (undated) DEVICE — SYR 3ML LL TIP 1/10ML GRAD --

## (undated) DEVICE — SUT ETHBND 2-0 30IN V5 GRN-WHT --

## (undated) DEVICE — TUBING, SUCTION, 1/4" X 10', STRAIGHT: Brand: MEDLINE

## (undated) DEVICE — SET PERF L203CM 12IN RED AND BLU AORT ROOT MULT SLIP CONN

## (undated) DEVICE — PROVE COVER: Brand: UNBRANDED

## (undated) DEVICE — DUAL LUMEN STOMACH TUBE MULTI-FUNCTIONAL PORT: Brand: SALEM SUMP

## (undated) DEVICE — CATHETER IV 14GA L2IN POLYUR STR ORNG HUB SFTY RADPQ DISP

## (undated) DEVICE — FORCEP BX LG CAP 2.4 MMX120 CM W/ NDL YEL RADIAL JAW 4 DISP

## (undated) DEVICE — SYR LR LCK 1ML GRAD NSAF 30ML --

## (undated) DEVICE — SUTURE TICRON DBL ARMED 2 0 CV 305 42IN BLU N ABSRB BRAID 8886303551

## (undated) DEVICE — GLOVE SURG SZ 85 CRM LTX FREE POLYISOPRENE POLYMER BEAD ANTI